# Patient Record
Sex: FEMALE | Race: OTHER | Employment: OTHER | URBAN - METROPOLITAN AREA
[De-identification: names, ages, dates, MRNs, and addresses within clinical notes are randomized per-mention and may not be internally consistent; named-entity substitution may affect disease eponyms.]

---

## 2017-03-11 ENCOUNTER — OFFICE VISIT (OUTPATIENT)
Dept: FAMILY MEDICINE CLINIC | Age: 67
End: 2017-03-11

## 2017-03-11 VITALS
BODY MASS INDEX: 46.11 KG/M2 | SYSTOLIC BLOOD PRESSURE: 138 MMHG | HEIGHT: 61 IN | HEART RATE: 78 BPM | DIASTOLIC BLOOD PRESSURE: 88 MMHG | WEIGHT: 244.2 LBS | TEMPERATURE: 98.3 F | RESPIRATION RATE: 18 BRPM | OXYGEN SATURATION: 98 %

## 2017-03-11 DIAGNOSIS — Z13.820 OSTEOPOROSIS SCREENING: ICD-10-CM

## 2017-03-11 DIAGNOSIS — R06.2 WHEEZING: ICD-10-CM

## 2017-03-11 DIAGNOSIS — I10 ESSENTIAL HYPERTENSION: Primary | ICD-10-CM

## 2017-03-11 DIAGNOSIS — Z12.31 VISIT FOR SCREENING MAMMOGRAM: ICD-10-CM

## 2017-03-11 DIAGNOSIS — R05.9 COUGH: ICD-10-CM

## 2017-03-11 DIAGNOSIS — E66.01 OBESITY, CLASS III, BMI 40-49.9 (MORBID OBESITY) (HCC): ICD-10-CM

## 2017-03-11 DIAGNOSIS — J06.9 UPPER RESPIRATORY TRACT INFECTION, UNSPECIFIED TYPE: ICD-10-CM

## 2017-03-11 RX ORDER — ACETAMINOPHEN 325 MG/1
TABLET ORAL
COMMUNITY
End: 2022-02-01 | Stop reason: ALTCHOICE

## 2017-03-11 RX ORDER — METHYLPREDNISOLONE 4 MG/1
TABLET ORAL
Qty: 1 DOSE PACK | Refills: 0 | Status: SHIPPED | OUTPATIENT
Start: 2017-03-11 | End: 2017-04-05 | Stop reason: ALTCHOICE

## 2017-03-11 RX ORDER — AMOXICILLIN AND CLAVULANATE POTASSIUM 875; 125 MG/1; MG/1
1 TABLET, FILM COATED ORAL 2 TIMES DAILY
Qty: 20 TAB | Refills: 0 | Status: SHIPPED | OUTPATIENT
Start: 2017-03-11 | End: 2017-03-21

## 2017-03-11 NOTE — MR AVS SNAPSHOT
Visit Information Date & Time Provider Department Dept. Phone Encounter #  
 3/11/2017 10:00 AM Taiwo Donaldson MD [address] [place] [address] [phone] [id number] Follow-up Instructions Return in about 4 days (around 3/15/2017) for follow up. Upcoming Health Maintenance Date Due DTaP/Tdap/Td series (1 - Tdap) 2/11/2004 ZOSTER VACCINE AGE 60> 4/8/2010 GLAUCOMA SCREENING Q2Y 4/8/2015 OSTEOPOROSIS SCREENING (DEXA) 4/8/2015 Pneumococcal 65+ Low/Medium Risk (1 of 2 - PCV13) 4/8/2015 MEDICARE YEARLY EXAM 4/8/2015 BREAST CANCER SCRN MAMMOGRAM 4/28/2016 COLONOSCOPY 7/8/2021 Allergies as of 3/11/2017  Review Complete On: 3/11/2017 By: Taiwo Donaldson MD  
  
 Severity Noted Reaction Type Reactions Bactrim [Sulfamethoprim Ds]  03/30/2012    Hives Clindamycin  03/30/2012   Side Effect Hives Current Immunizations  Reviewed on 3/11/2017 Name Date Influenza Vaccine 11/5/2016, 12/3/2014 Td 2/10/2004 Reviewed by Taiwo Donaldson MD on 3/11/2017 at  9:45 AM  
You Were Diagnosed With   
  
 Codes Comments Essential hypertension    -  Primary ICD-10-CM: I10 
ICD-9-CM: 401.9 Cough     ICD-10-CM: R05 ICD-9-CM: 514. 2 Upper respiratory tract infection, unspecified type     ICD-10-CM: J06.9 ICD-9-CM: 465.9 Wheezing     ICD-10-CM: R06.2 ICD-9-CM: 786.07 Visit for screening mammogram     ICD-10-CM: Z12.31 
ICD-9-CM: V76.12 Osteoporosis screening     ICD-10-CM: Z13.820 ICD-9-CM: V82.81 Obesity, Class III, BMI 40-49.9 (morbid obesity) (HCC)     ICD-10-CM: E66.01 
ICD-9-CM: 278.01 Vitals BP Pulse Temp Resp Height(growth percentile) Weight(growth percentile) 138/88 78 98.3 °F (36.8 °C) (Oral) 18 5' 1\" (1.549 m) 244 lb 3.2 oz (110.8 kg) LMP SpO2 BMI OB Status Smoking Status 03/02/2001 98% 46.14 kg/m2 Postmenopausal Never Smoker Vitals History BMI and BSA Data Body Mass Index Body Surface Area  
 46.14 kg/m 2 2.18 m 2 Preferred Pharmacy Pharmacy Name Phone [facility] [address] [facility] [phone] Your Updated Medication List  
  
   
This list is accurate as of: 3/11/17 10:15 AM.  Always use your most recent med list.  
  
  
  
  
 * albuterol 2.5 mg /3 mL (0.083 %) nebulizer solution Commonly known as:  PROVENTIL VENTOLIN  
3 mL by Nebulization route every four (4) hours as needed for Wheezing. * albuterol 90 mcg/actuation inhaler Commonly known as:  PROAIR HFA Take 2 Puffs by inhalation every six (6) hours as needed for Wheezing or Shortness of Breath. amoxicillin-clavulanate 875-125 mg per tablet Commonly known as:  AUGMENTIN Take 1 Tab by mouth two (2) times a day for 10 days. aspirin delayed-release 81 mg tablet Take 81 mg by mouth daily. methylPREDNISolone 4 mg tablet Commonly known as:  Yun Reason Take as directed  
  
 montelukast 10 mg tablet Commonly known as:  SINGULAIR  
TAKE ONE TABLET BY MOUTH DAILY FOR ALLERGIES AND ASTHMA THERAFLU COUGH PO Take  by mouth. TYLENOL 325 mg tablet Generic drug:  acetaminophen Take  by mouth every four (4) hours as needed for Pain. * Notice: This list has 2 medication(s) that are the same as other medications prescribed for you. Read the directions carefully, and ask your doctor or other care provider to review them with you. Prescriptions Sent to Pharmacy Refills  
 methylPREDNISolone (MEDROL DOSEPACK) 4 mg tablet 0 Sig: Take as directed Class: Normal  
 Pharmacy: [address]  #: [phone]  
 amoxicillin-clavulanate (AUGMENTIN) 875-125 mg per tablet 0 Sig: Take 1 Tab by mouth two (2) times a day for 10 days.   
 Class: Normal  
 Pharmacy: [address] [place] [address], [address] [facility]  #: [phone] Route: Oral  
  
Follow-up Instructions Return in about 4 days (around 3/15/2017) for follow up. To-Do List   
 03/11/2017 Imaging:  DEXA BONE DENSITY STUDY AXIAL   
  
 03/11/2017 Imaging:  CARLOS MAMMO BI SCREENING INCL CAD Referral Information Referral ID Referred By Referred To  
  
 [id number] Hector Lorenzo Not Available Visits Status Start Date End Date 1 New Request 3/11/17 3/11/18 If your referral has a status of pending review or denied, additional information will be sent to support the outcome of this decision. Introducing [facility] & HEALTH SERVICES! Dear Farhad Winters: Thank you for requesting a [employer] account. Our records indicate that you already have an active [employer] account. You can access your account anytime at [email or web address] Did you know that you can access your hospital and ER discharge instructions at any time in [employer]? You can also review all of your test results from your hospital stay or ER visit. Additional Information If you have questions, please visit the Frequently Asked Questions section of the [employer] website at [email or web address] Remember, [employer] is NOT to be used for urgent needs. For medical emergencies, dial 911. Now available from your iPhone and Android! Please provide this summary of care documentation to your next provider. Your primary care clinician is listed as Eloise Temple. If you have any questions after today's visit, please call [phone].

## 2017-03-11 NOTE — PROGRESS NOTES
Chief Complaint   Patient presents with    Wheezing     albuterol nebulizer used at 0400    Cough     x 6 days, productive clear mucus    Headache     Pt c/o cough and wheezing x 1 week, theraflu taken  Also c/o a constant headache 8/10, otc tylenol taken with little   \"REVIEWED RECORD IN PREPARATION FOR VISIT AND HAVE OBTAINED THE NECESSARY DOCUMENTATION\"

## 2017-03-11 NOTE — PROGRESS NOTES
HISTORY OF PRESENT ILLNESS  Vanessa Tabor is a 77 y.o. female c/o cough and congestion. Blood pressure 138/88, pulse 78, temperature 98.3 °F (36.8 °C), temperature source Oral, resp. rate 18, height 5' 1\" (1.549 m), weight 244 lb 3.2 oz (110.8 kg), last menstrual period 03/02/2001, SpO2 98 %. Body mass index is 46.14 kg/(m^2). Chief Complaint   Patient presents with    Wheezing     albuterol nebulizer used at 0400    Cough     x 6 days, productive clear mucus    Headache     Wheezing    Associated symptoms include headaches and cough. Pertinent negatives include no chest pain. Cough   Associated symptoms include headaches. Pertinent negatives include no chest pain and no shortness of breath. Headache   Associated symptoms include headaches. Pertinent negatives include no chest pain and no shortness of breath. Cough: Pt c/o sinus pressure, productive cough, and nasal congestion x Monday and worsening on Wednesday. Pt endorses using nebulizer treatments last night and at 4am this morning. Pt denies fever or chills. Pt reports SOB with exertion. Pt endorses taking Singulair daily and using an inhaler this morning. Pt states a Z-pack prescribed by Dr. Shantel Brito did not offer relief a few months ago. BP: Pt's BP is slightly elevated at 149/74 and 138/88 upon recheck today. Pt endorses checking her BP at home. Health Maintenance: Pt is due for mammogram and dexa bone density study. Pt was instructed to have a Dexa bone density study every 2 years. Current Outpatient Prescriptions   Medication Sig Dispense Refill    DEXTROMETHORPHAN HBR (THERAFLU COUGH PO) Take  by mouth.  acetaminophen (TYLENOL) 325 mg tablet Take  by mouth every four (4) hours as needed for Pain.  methylPREDNISolone (MEDROL DOSEPACK) 4 mg tablet Take as directed 1 Dose Pack 0    amoxicillin-clavulanate (AUGMENTIN) 875-125 mg per tablet Take 1 Tab by mouth two (2) times a day for 10 days.  20 Tab 0    montelukast (SINGULAIR) 10 mg tablet TAKE ONE TABLET BY MOUTH DAILY FOR ALLERGIES AND ASTHMA 30 Tab 5    albuterol (PROVENTIL VENTOLIN) 2.5 mg /3 mL (0.083 %) nebulizer solution 3 mL by Nebulization route every four (4) hours as needed for Wheezing. 24 Each 3    albuterol (PROAIR HFA) 90 mcg/actuation inhaler Take 2 Puffs by inhalation every six (6) hours as needed for Wheezing or Shortness of Breath. 1 Inhaler 5    aspirin delayed-release 81 mg tablet Take 81 mg by mouth daily. Allergies   Allergen Reactions    Bactrim [Sulfamethoprim Ds] Hives    Clindamycin Hives     Past Medical History:   Diagnosis Date    AR (allergic rhinitis) 2010    Chronic Venous Stasis BLE 10/14/2010    DJD (degenerative joint disease) of knee 2010    Factor V Leiden mutation ([facility] 2010    HTN (hypertension) 2010    Plantar fasciitis 2010    Postmenopausal 2010    Stasis, venous 2010    Stroke ([facility]     T. I.A. 2010    Venous stasis dermatitis 10/14/2010     Past Surgical History:   Procedure Laterality Date    HX BACK SURGERY      lumbar disc    HX  SECTION       Family History   Problem Relation Age of Onset   Verlinda Smoker Asthma Sister     Asthma Brother     MS Daughter     Allergic Rhinitis Son     Thyroid Disease Daughter      Social History   Substance Use Topics    Smoking status: Never Smoker    Smokeless tobacco: Never Used    Alcohol use No          Review of Systems   Constitutional: Negative. Negative for malaise/fatigue. Eyes: Negative for blurred vision. Respiratory: Positive for cough and wheezing. Negative for shortness of breath. Cardiovascular: Negative for chest pain and leg swelling. Musculoskeletal: Negative. Neurological: Positive for headaches. Negative for dizziness. All other systems reviewed and are negative. Physical Exam   Constitutional: She is oriented to person, place, and time. She appears well-developed and well-nourished. No distress. HENT:   Head: Normocephalic and atraumatic. Neck: Carotid bruit is not present. Cardiovascular: Normal rate, regular rhythm, normal heart sounds and intact distal pulses. Exam reveals no gallop and no friction rub. No murmur heard. Pulmonary/Chest: Effort normal. No respiratory distress. She has wheezes. She has no rales. Diffuse wheezing bilaterally   Musculoskeletal: She exhibits no edema. Neurological: She is alert and oriented to person, place, and time. Skin: She is not diaphoretic. Psychiatric: She has a normal mood and affect. Her behavior is normal. Judgment and thought content normal.   Nursing note and vitals reviewed. ASSESSMENT and PLAN  Julia was seen today for wheezing, cough and headache. Diagnoses and all orders for this visit:    Essential hypertension  Not adequately controlled, will follow up next week with me    Cough  -     Advised to take OTC robitussin  Upper respiratory tract infection, unspecified type  -     methylPREDNISolone (MEDROL DOSEPACK) 4 mg tablet; Take as directed  -     amoxicillin-clavulanate (AUGMENTIN) 875-125 mg per tablet; Take 1 Tab by mouth two (2) times a day for 10 days. Wheezing  -     methylPREDNISolone (MEDROL DOSEPACK) 4 mg tablet; Take as directed    Visit for screening mammogram  -     CARLOS MAMMO BI SCREENING INCL CAD; Future    Osteoporosis screening  -     DEXA BONE DENSITY STUDY AXIAL; Future    Obesity, Class III, BMI 40-49.9 (morbid obesity) ([facility]  I have reviewed/discussed the above normal BMI with the patient. I have recommended the following interventions: dietary management education, guidance, and counseling, encourage exercise and weight loss from baseline weight . The plan is as follows: I have counseled this patient on diet and exercise regimens. .      Pt is due for a mammogram. Mammogram ordered today. Pt is due for a Dexa bone density study. Mammogram ordered today.     I prescribed Augmentin 875-125mg BID for 7 days and a Medrol dosepack for management of URI. Pt was given a nebulizer treatment in the office today. Pt will f/u in 4 days. Follow-up Disposition:  Return in about 4 days (around 3/15/2017) for follow up. Medication risks/benefits/costs/interactions/alternatives discussed with patient. Advised patient to call back or return to office if symptoms worsen/change/persist.  If patient cannot reach us or should anything more severe/urgent arise he/she should proceed directly to the nearest emergency department. Discussed expected course/resolution/complications of diagnosis in detail with patient. Patient given a written after visit summary which includes her diagnoses, current medications and vitals. Patient expressed understanding with the diagnosis and plan. Written by Kaylie Shipley, as dictated by Dr. John Doe.    I have reviewed and agree with the above note and have made corrections where appropriate, Dr. John Doe MD

## 2017-03-15 ENCOUNTER — OFFICE VISIT (OUTPATIENT)
Dept: FAMILY MEDICINE CLINIC | Age: 67
End: 2017-03-15

## 2017-03-15 VITALS
DIASTOLIC BLOOD PRESSURE: 82 MMHG | BODY MASS INDEX: 45.84 KG/M2 | TEMPERATURE: 98.7 F | HEIGHT: 61 IN | SYSTOLIC BLOOD PRESSURE: 138 MMHG | OXYGEN SATURATION: 96 % | HEART RATE: 77 BPM | RESPIRATION RATE: 16 BRPM | WEIGHT: 242.8 LBS

## 2017-03-15 DIAGNOSIS — J45.909 BRONCHITIS WITH ASTHMA, ACUTE: Primary | ICD-10-CM

## 2017-03-15 DIAGNOSIS — J45.41 MODERATE PERSISTENT ASTHMA WITH ACUTE EXACERBATION: ICD-10-CM

## 2017-03-15 DIAGNOSIS — J20.9 BRONCHITIS WITH ASTHMA, ACUTE: Primary | ICD-10-CM

## 2017-03-15 DIAGNOSIS — R06.02 SOB (SHORTNESS OF BREATH): ICD-10-CM

## 2017-03-15 RX ORDER — LEVOFLOXACIN 500 MG/1
500 TABLET, FILM COATED ORAL DAILY
Qty: 10 TAB | Refills: 0 | Status: SHIPPED | OUTPATIENT
Start: 2017-03-15 | End: 2017-03-25

## 2017-03-15 NOTE — LETTER
NOTIFICATION RETURN TO WORK  
 
3/15/2017 7:11 PM 
 
Ms. Brielle Lorenzo 
[address] [facility] [address] [address] To Whom It May Concern: 
 
Brielle Lorenzo is currently under the care of GLENYS Estrada. She will return to work on: 3/20/17 If there are questions or concerns please have the patient contact our office. Sincerely, Shun Maynard MD

## 2017-03-15 NOTE — PROGRESS NOTES
HISTORY OF PRESENT ILLNESS  Monalisa Wick is a 77 y.o. female. Blood pressure (!) 143/96, pulse 77, temperature 98.7 °F (37.1 °C), temperature source Oral, resp. rate 16, height 5' 1\" (1.549 m), weight 242 lb 12.8 oz (110.1 kg), last menstrual period 03/02/2001, SpO2 96 %. Body mass index is 45.88 kg/(m^2). Chief Complaint   Patient presents with    Cough      HPI   Monalisa Wick 77 y.o. female  presents to the office today for a follow up on URI. Bp at office today 138/82 with manual arm cuff recheck. URI: Recall pt was seen at office on 03/11/17 for sinus pressure, productive cough, and congestion for past 1-2 weeks. Pt was advised to start Augmentin and Medrol Dose Pack. Pt notes she continues with productive cough and congestion. She states has progressed from clear to yellow-tinged. Pt notes history of asthma and has been receiving Albuterol nebulizer treatments at home. Xopenex nebulizer treatment given to pt at office today. Her chest XR today revealed no acute process. Likely bronchitis. I have advised pt to start Symbicort inhaler for her breathing and to start Levaquin. Pt to notify me if symptoms progress or fail to improve. Pt advised to go to emergency room if SOB progresses. Pt will follow up with OV in two weeks. Current Outpatient Prescriptions   Medication Sig Dispense Refill    levoFLOXacin (LEVAQUIN) 500 mg tablet Take 1 Tab by mouth daily for 10 days. 10 Tab 0    acetaminophen (TYLENOL) 325 mg tablet Take  by mouth every four (4) hours as needed for Pain.  methylPREDNISolone (MEDROL DOSEPACK) 4 mg tablet Take as directed 1 Dose Pack 0    amoxicillin-clavulanate (AUGMENTIN) 875-125 mg per tablet Take 1 Tab by mouth two (2) times a day for 10 days.  20 Tab 0    montelukast (SINGULAIR) 10 mg tablet TAKE ONE TABLET BY MOUTH DAILY FOR ALLERGIES AND ASTHMA 30 Tab 5    albuterol (PROVENTIL VENTOLIN) 2.5 mg /3 mL (0.083 %) nebulizer solution 3 mL by Nebulization route every four (4) hours as needed for Wheezing. 24 Each 3    albuterol (PROAIR HFA) 90 mcg/actuation inhaler Take 2 Puffs by inhalation every six (6) hours as needed for Wheezing or Shortness of Breath. 1 Inhaler 5    aspirin delayed-release 81 mg tablet Take 81 mg by mouth daily.  DEXTROMETHORPHAN HBR (THERAFLU COUGH PO) Take  by mouth. Allergies   Allergen Reactions    Bactrim [Sulfamethoprim Ds] Hives    Clindamycin Hives     Past Medical History:   Diagnosis Date    AR (allergic rhinitis) 2010    Chronic Venous Stasis BLE 10/14/2010    DJD (degenerative joint disease) of knee 2010    Factor V Leiden mutation ([facility] 2010    HTN (hypertension) 2010    Plantar fasciitis 2010    Postmenopausal 2010    Stasis, venous 2010    Stroke ([facility]     T. I.A. 2010    Venous stasis dermatitis 10/14/2010     Past Surgical History:   Procedure Laterality Date    HX BACK SURGERY      lumbar disc    HX  SECTION       Family History   Problem Relation Age of Onset   Pinedo Asthma Sister     Asthma Brother     MS Daughter     Allergic Rhinitis Son     Thyroid Disease Daughter      Social History   Substance Use Topics    Smoking status: Never Smoker    Smokeless tobacco: Never Used    Alcohol use No        Review of Systems   Constitutional: Negative. Negative for malaise/fatigue. HENT: Positive for congestion. Eyes: Negative for blurred vision. Respiratory: Positive for cough, sputum production (yellow), shortness of breath and wheezing. Cardiovascular: Negative for chest pain and leg swelling. Musculoskeletal: Negative. Neurological: Negative. Negative for dizziness and headaches. All other systems reviewed and are negative. Physical Exam   Constitutional: She is oriented to person, place, and time. She appears well-developed and well-nourished. No distress. HENT:   Head: Normocephalic and atraumatic.    Neck: Carotid bruit is not present. Cardiovascular: Normal rate, regular rhythm, normal heart sounds and intact distal pulses. Exam reveals no gallop and no friction rub. No murmur heard. Pulmonary/Chest: Effort normal. No respiratory distress. She has wheezes (bilaterally ). She has no rales. Musculoskeletal: She exhibits no edema. Neurological: She is alert and oriented to person, place, and time. Skin: She is not diaphoretic. Psychiatric: She has a normal mood and affect. Her behavior is normal. Judgment and thought content normal.   Nursing note and vitals reviewed. ASSESSMENT and PLAN  Julia was seen today for cough. Diagnoses and all orders for this visit:    Bronchitis with asthma, acute  -     levoFLOXacin (LEVAQUIN) 500 mg tablet; Take 1 Tab by mouth daily for 10 days. - Chest XR today revealed no pneumonia. Likely bronchitis. I have advised pt to start Symbicort inhaler to help her breathing and to start 10-day course of Levaquin. Advised pt to go to the emergency room if SOB progresses. Pt will follow up with OV in two weeks. Moderate persistent asthma with acute exacerbation  See above    SOB (shortness of breath)  -     XR CHEST PA LAT; Future  - See above. Follow-up Disposition:  Return in about 2 weeks (around 3/29/2017) for bronchitis. Medication risks/benefits/costs/interactions/alternatives discussed with patient. Advised patient to call back or return to office if symptoms worsen/change/persist.  If patient cannot reach us or should anything more severe/urgent arise he/she should proceed directly to the nearest emergency department. Discussed expected course/resolution/complications of diagnosis in detail with patient. Patient given a written after visit summary which includes her diagnoses, current medications and vitals. Patient expressed understanding with the diagnosis and plan. Written by elvira Aviles, as dictated by Aureliano Ghosh M.D.    I have reviewed and agree with the above note and have made corrections where appropriate, Dr. Carolyn An MD

## 2017-03-15 NOTE — PROGRESS NOTES
Chief Complaint   Patient presents with    Cough       1. Have you been to the ER, urgent care clinic since your last visit? Hospitalized since your last visit? No    2. Have you seen or consulted any other health care providers outside of the [address] [place] [address] since your last visit? Include any pap smears or colon screening. No    Body mass index is 45.88 kg/(m^2).

## 2017-03-21 ENCOUNTER — HOSPITAL ENCOUNTER (OUTPATIENT)
Dept: MAMMOGRAPHY | Age: 67
Discharge: HOME | End: 2017-03-21
Attending: FAMILY MEDICINE
Payer: COMMERCIAL

## 2017-03-21 DIAGNOSIS — Z12.31 VISIT FOR SCREENING MAMMOGRAM: ICD-10-CM

## 2017-03-21 DIAGNOSIS — Z13.820 OSTEOPOROSIS SCREENING: ICD-10-CM

## 2017-03-21 PROCEDURE — 77080 DXA BONE DENSITY AXIAL: CPT

## 2017-03-21 PROCEDURE — 77067 SCR MAMMO BI INCL CAD: CPT

## 2017-03-21 NOTE — LETTER
3/22/2017 12:09 PM 
 
Ms. [facility] 
[address] [facility] [address] [address] [address] [place] [address]: 
 
Please find your most recent results below. Resulted Orders CARLOS MAMMO BI SCREENING INCL CAD Narrative STUDY: Bilateral digital screening mammogram 
 
INDICATION:  Screening. COMPARISON:  Priors dating back to 2010 BREAST COMPOSITION:  There are scattered areas of fibroglandular density. FINDINGS: Bilateral digital screening mammography was performed and is 
interpreted in conjunction with a computer assisted detection (CAD) system. No 
suspicious masses or calcifications are identified. There has been no 
significant change. Impression IMPRESSION: 
BI-RADS 1: Negative. No mammographic evidence of malignancy. RECOMMENDATIONS: 
Next screening mammogram is recommended in one year. The patient will be notified of these results. Please call me if you have any questions: [phone] Sincerely, Jordana Doe

## 2017-03-21 NOTE — PROGRESS NOTES
IMPRESSION:  BI-RADS 1: Negative. No mammographic evidence of malignancy.     RECOMMENDATIONS:  Next screening mammogram is recommended in one year.

## 2017-04-05 ENCOUNTER — OFFICE VISIT (OUTPATIENT)
Dept: FAMILY MEDICINE CLINIC | Age: 67
End: 2017-04-05

## 2017-04-05 ENCOUNTER — TELEPHONE (OUTPATIENT)
Dept: FAMILY MEDICINE CLINIC | Age: 67
End: 2017-04-05

## 2017-04-05 VITALS
OXYGEN SATURATION: 97 % | BODY MASS INDEX: 47.01 KG/M2 | TEMPERATURE: 98.1 F | DIASTOLIC BLOOD PRESSURE: 68 MMHG | WEIGHT: 249 LBS | SYSTOLIC BLOOD PRESSURE: 136 MMHG | RESPIRATION RATE: 18 BRPM | HEIGHT: 61 IN | HEART RATE: 88 BPM

## 2017-04-05 DIAGNOSIS — L03.116 CELLULITIS OF LEFT LOWER EXTREMITY: Primary | ICD-10-CM

## 2017-04-05 DIAGNOSIS — R60.9 EDEMA, UNSPECIFIED TYPE: ICD-10-CM

## 2017-04-05 DIAGNOSIS — J45.41 MODERATE PERSISTENT ASTHMA WITH ACUTE EXACERBATION: ICD-10-CM

## 2017-04-05 RX ORDER — CEFTRIAXONE 1 G/1
1 INJECTION, POWDER, FOR SOLUTION INTRAMUSCULAR; INTRAVENOUS ONCE
Qty: 1 VIAL | Refills: 0
Start: 2017-04-05 | End: 2017-04-05

## 2017-04-05 RX ORDER — FLUTICASONE PROPIONATE AND SALMETEROL 250; 50 UG/1; UG/1
1 POWDER RESPIRATORY (INHALATION) EVERY 12 HOURS
Qty: 1 INHALER | Refills: 0 | Status: SHIPPED | OUTPATIENT
Start: 2017-04-05 | End: 2017-08-14 | Stop reason: ALTCHOICE

## 2017-04-05 RX ORDER — CEPHALEXIN 500 MG/1
500 CAPSULE ORAL 4 TIMES DAILY
Qty: 40 CAP | Refills: 0 | Status: SHIPPED | OUTPATIENT
Start: 2017-04-05 | End: 2017-04-15

## 2017-04-05 RX ORDER — MONTELUKAST SODIUM 10 MG/1
TABLET ORAL
Qty: 90 TAB | Refills: 1 | Status: SHIPPED | OUTPATIENT
Start: 2017-04-05 | End: 2017-08-14 | Stop reason: SDUPTHER

## 2017-04-05 RX ORDER — HYDROXYZINE 50 MG/1
50 TABLET, FILM COATED ORAL
Qty: 30 TAB | Refills: 0 | Status: SHIPPED | OUTPATIENT
Start: 2017-04-05 | End: 2017-04-15

## 2017-04-05 RX ORDER — ALBUTEROL SULFATE 0.83 MG/ML
2.5 SOLUTION RESPIRATORY (INHALATION)
Qty: 72 EACH | Refills: 1 | Status: SHIPPED | OUTPATIENT
Start: 2017-04-05 | End: 2021-08-03

## 2017-04-05 RX ORDER — FUROSEMIDE 20 MG/1
20 TABLET ORAL DAILY
Qty: 30 TAB | Refills: 0 | Status: SHIPPED | OUTPATIENT
Start: 2017-04-05 | End: 2017-08-14

## 2017-04-05 NOTE — TELEPHONE ENCOUNTER
Patients daughter, Bj Marsh is calling, Bj Marsh states that she is currently calling her mother right now to see if she is able to come in and see Dr. Mary Yi at 10:30. Bj Marsh states that her mother just got back from [place] and while she was over there she was complaining about her cellulitis in her leg \"acting up\" and also wheezing. Bj Marsh states that normally Dr. Mary Yi just prescribes the patient medication for her cellulitis without seeing her, advised Jeanne Joseph guarantee he will do that but I will send back a message and the nurse will have to give her call back .      Best call back # for [phone]

## 2017-04-05 NOTE — PROGRESS NOTES
HISTORY OF PRESENT ILLNESS  Haile Alexander is a 77 y.o. female. Blood pressure 136/68, pulse 88, temperature 98.1 °F (36.7 °C), temperature source Oral, resp. rate 18, height 5' 1\" (1.549 m), weight 249 lb (112.9 kg), last menstrual period 03/02/2001, SpO2 97 %. Body mass index is 47.05 kg/(m^2). Chief Complaint   Patient presents with    Skin Infection     cellulitis of left lower leg and left foot    Cough     cough with clear sputum x while      HPI   Haile Alexander 77 y.o. female  presents to the office today with acute complaint of cellulitis. Bp at office today 136/68. Cellulitis: Pt notes skin infection and associated swelling of left lower leg. Her left lower leg is slightly tender during the day and very sensitive in the evenings. There is also associated swelling in right lower leg and pt notes similar symptoms are starting in her right leg. She has been elevating her legs with mild relief. Likely cellulitis of left LE. Pt will receive Rocephin injection at office today and I have advised her to start course of Keflex. Pt may also take Lasix as needed for swelling and Atarax as needed for her skin irritation. Advised pt to go to the emergency room if she notes the erythema of her legs spreads further. Wheezing: Pt has history of asthma and she has been having issues with wheezing since coming back from a trip to [place] ([facility]). Pt also continues with a cough producing clear sputum. She notes she has used Advair inhaler in the past and this has worked well for her. I have advised pt to start Singulair, Advair inhaler, and continue with Albuterol nebulizer and Symbicort. Samples of Symbicort given today. Current Outpatient Prescriptions   Medication Sig Dispense Refill    acetaminophen (TYLENOL) 325 mg tablet Take  by mouth every four (4) hours as needed for Pain.       montelukast (SINGULAIR) 10 mg tablet TAKE ONE TABLET BY MOUTH DAILY FOR ALLERGIES AND ASTHMA 30 Tab 5    albuterol (PROVENTIL VENTOLIN) 2.5 mg /3 mL (0.083 %) nebulizer solution 3 mL by Nebulization route every four (4) hours as needed for Wheezing. 24 Each 3    albuterol (PROAIR HFA) 90 mcg/actuation inhaler Take 2 Puffs by inhalation every six (6) hours as needed for Wheezing or Shortness of Breath. 1 Inhaler 5    aspirin delayed-release 81 mg tablet Take 81 mg by mouth daily. Allergies   Allergen Reactions    Bactrim [Sulfamethoprim Ds] Hives    Clindamycin Hives     Past Medical History:   Diagnosis Date    AR (allergic rhinitis) 2010    Chronic Venous Stasis BLE 10/14/2010    DJD (degenerative joint disease) of knee 2010    Factor V Leiden mutation ([facility] 2010    HTN (hypertension) 2010    Plantar fasciitis 2010    Postmenopausal 2010    Stasis, venous 2010    Stroke ([facility]     T. I.A. 2010    Venous stasis dermatitis 10/14/2010     Past Surgical History:   Procedure Laterality Date    HX BACK SURGERY      lumbar disc    HX  SECTION       Family History   Problem Relation Age of Onset   24 [facility] Asthma Sister     Asthma Brother     MS Daughter     Allergic Rhinitis Son     Thyroid Disease Daughter      Social History   Substance Use Topics    Smoking status: Never Smoker    Smokeless tobacco: Never Used    Alcohol use No        Review of Systems   Constitutional: Negative. Negative for malaise/fatigue. Eyes: Negative for blurred vision. Respiratory: Positive for cough and sputum production (clear). Negative for shortness of breath. Cardiovascular: Negative for chest pain and leg swelling. Musculoskeletal: Negative. Skin: Positive for itching.        + cellulitis of left lower leg and associated swelling (less so in right lower leg)    Neurological: Negative. Negative for dizziness and headaches. All other systems reviewed and are negative. Physical Exam   Constitutional: She is oriented to person, place, and time.  She appears well-developed and well-nourished. No distress. HENT:   Head: Normocephalic and atraumatic. Neck: Carotid bruit is not present. Cardiovascular: Normal rate, regular rhythm, normal heart sounds and intact distal pulses. Exam reveals no gallop and no friction rub. No murmur heard. Pulmonary/Chest: Effort normal and breath sounds normal. No respiratory distress. She has no wheezes. She has no rales. Musculoskeletal: She exhibits edema (trace edema of LEs). Neurological: She is alert and oriented to person, place, and time. Skin: She is not diaphoretic. Left lower leg: Circumferential erythema around left lower leg region going around, also notes swelling around, and little punctate lesions, no drainage, slight tenderness to palpation, warmth to touch, Peter's negative  Right lower leg: Peter's negative, also slight erythema noted in calf region, punctate lesions, slight erythema, but not as intense as in left leg   Psychiatric: She has a normal mood and affect. Her behavior is normal. Judgment and thought content normal.   Nursing note and vitals reviewed. ASSESSMENT and PLAN  Geno Mccoy was seen today for skin infection and cough. Diagnoses and all orders for this visit:    Cellulitis of left lower extremity  -     cefTRIAXone (ROCEPHIN) 1 gram injection; 1 g by IntraMUSCular route once for 1 dose. -     CEFTRIAXONE SODIUM INJECTION  MG (Qty 4 for 1 gm)  -     THER/PROPH/DIAG INJECTION, SUBCUT/IM  -     cephALEXin (KEFLEX) 500 mg capsule; Take 1 Cap by mouth four (4) times daily for 10 days.        - Pt will receive Rocephin injection at office today and I have advised her to start course of Keflex. Pt may also take Lasix as needed for swelling and Atarax as needed for her skin irritation. Advised pt to go to the emergency room if she notes the erythema of her legs spreads further. Pt will follow up with OV in one week.      Edema, unspecified type  -     furosemide (LASIX) 20 mg tablet; Take 1 Tab by mouth daily.  - Pt to take Lasix as needed to help improve swelling. Moderate persistent asthma with acute exacerbation  -     albuterol (PROVENTIL VENTOLIN) 2.5 mg /3 mL (0.083 %) nebulizer solution; 3 mL by Nebulization route every four (4) hours as needed for Wheezing.  -     montelukast (SINGULAIR) 10 mg tablet; TAKE ONE TABLET BY MOUTH DAILY FOR ALLERGIES AND ASTHMA  -     fluticasone-salmeterol (ADVAIR) 250-50 mcg/dose diskus inhaler; Take 1 Puff by inhalation every twelve (12) hours. -  Pt notes flare up of her asthma with associated wheezing since coming back from a trip to [place] ([facility]). I have advised pt to start Singulair 10 mg daily, Advair inhaler, and continue with Albuterol nebulizer and Symbicort. Samples of Symbicort given today. Other orders  -     hydrOXYzine HCl (ATARAX) 50 mg tablet; Take 1 Tab by mouth three (3) times daily as needed for Itching for up to 10 days. Follow-up Disposition:  Return in about 1 week (around 4/12/2017) for cellulitis follow up at 7:15PM.     Medication risks/benefits/costs/interactions/alternatives discussed with patient. Advised patient to call back or return to office if symptoms worsen/change/persist.  If patient cannot reach us or should anything more severe/urgent arise he/she should proceed directly to the nearest emergency department. Discussed expected course/resolution/complications of diagnosis in detail with patient. Patient given a written after visit summary which includes her diagnoses, current medications and vitals. Patient expressed understanding with the diagnosis and plan. Written by elvira Hernandez, as dictated by Jenaro Taylor M.D.    I have reviewed and agree with the above note and have made corrections where appropriate, Dr. Osman Hayes MD

## 2017-04-05 NOTE — MR AVS SNAPSHOT
Visit Information Date & Time Provider Department Dept. Phone Encounter #  
 4/5/2017 10:30 AM Lyndsey Baugh MD [facility] [phone] [id number] Follow-up Instructions Return in about 1 week (around 4/12/2017) for cellulitis follow up at 7:15PM. Your Appointments 4/10/2017  4:30 PM  
ROUTINE CARE with Lyndsey Baugh MD  
[facility]) Appt Note: follow up visit [address] [id number]  
[phone]  
  
   
 [address] [id number] Upcoming Health Maintenance Date Due DTaP/Tdap/Td series (1 - Tdap) 2/11/2004 ZOSTER VACCINE AGE 60> 4/8/2010 GLAUCOMA SCREENING Q2Y 4/8/2015 Pneumococcal 65+ Low/Medium Risk (1 of 2 - PCV13) 4/8/2015 MEDICARE YEARLY EXAM 4/8/2015 BREAST CANCER SCRN MAMMOGRAM 3/21/2019 COLONOSCOPY 7/8/2021 Allergies as of 4/5/2017  Review Complete On: 3/17/2017 By: Lyndsey Baugh MD  
  
 Severity Noted Reaction Type Reactions Bactrim [Sulfamethoprim Ds]  03/30/2012    Hives Clindamycin  03/30/2012   Side Effect Hives Current Immunizations  Reviewed on 3/11/2017 Name Date Influenza Vaccine 11/5/2016, 12/3/2014 Td 2/10/2004 Not reviewed this visit You Were Diagnosed With   
  
 Codes Comments Cellulitis of left lower extremity    -  Primary ICD-10-CM: L03.116 ICD-9-CM: 682.6 Edema, unspecified type     ICD-10-CM: R60.9 ICD-9-CM: 782.3 Moderate persistent asthma with acute exacerbation     ICD-10-CM: J45.41 
ICD-9-CM: 493.92 Vitals BP Pulse Temp Resp Height(growth percentile) Weight(growth percentile) 136/68 (BP 1 Location: Right arm, BP Patient Position: Sitting) 88 98.1 °F (36.7 °C) (Oral) 18 5' 1\" (1.549 m) 249 lb (112.9 kg) LMP SpO2 BMI OB Status Smoking Status 03/02/2001 97% 47.05 kg/m2 Postmenopausal Never Smoker Vitals History BMI and BSA Data Body Mass Index Body Surface Area 47.05 kg/m 2 2.2 m 2 Preferred Pharmacy Pharmacy Name Phone [facility] [phone] [facility] [phone] Your Updated Medication List  
  
   
This list is accurate as of: 4/5/17 11:35 AM.  Always use your most recent med list.  
  
  
  
  
 * albuterol 90 mcg/actuation inhaler Commonly known as:  PROAIR HFA Take 2 Puffs by inhalation every six (6) hours as needed for Wheezing or Shortness of Breath. * albuterol 2.5 mg /3 mL (0.083 %) nebulizer solution Commonly known as:  PROVENTIL VENTOLIN  
3 mL by Nebulization route every four (4) hours as needed for Wheezing. aspirin delayed-release 81 mg tablet Take 81 mg by mouth daily. cefTRIAXone 1 gram injection Commonly known as:  ROCEPHIN  
1 g by IntraMUSCular route once for 1 dose. cephALEXin 500 mg capsule Commonly known as:  Deward Curly Take 1 Cap by mouth four (4) times daily for 10 days. fluticasone-salmeterol 250-50 mcg/dose diskus inhaler Commonly known as:  ADVAIR Take 1 Puff by inhalation every twelve (12) hours. furosemide 20 mg tablet Commonly known as:  LASIX Take 1 Tab by mouth daily. hydrOXYzine HCl 50 mg tablet Commonly known as:  ATARAX Take 1 Tab by mouth three (3) times daily as needed for Itching for up to 10 days. montelukast 10 mg tablet Commonly known as:  SINGULAIR  
TAKE ONE TABLET BY MOUTH DAILY FOR ALLERGIES AND ASTHMA TYLENOL 325 mg tablet Generic drug:  acetaminophen Take  by mouth every four (4) hours as needed for Pain. * Notice: This list has 2 medication(s) that are the same as other medications prescribed for you. Read the directions carefully, and ask your doctor or other care provider to review them with you. Prescriptions Sent to Pharmacy  Refills  
 albuterol (PROVENTIL VENTOLIN) 2.5 mg /3 mL (0.083 %) nebulizer solution 1  
 Sig: 3 mL by Nebulization route every four (4) hours as needed for Wheezing. Class: Normal  
 Pharmacy: [address] Ph #: [phone] Route: Nebulization  
 montelukast (SINGULAIR) 10 mg tablet 1 Sig: TAKE ONE TABLET BY MOUTH DAILY FOR ALLERGIES AND ASTHMA Class: Normal  
 Pharmacy: [address] Ph #: [phone]  
 cephALEXin (KEFLEX) 500 mg capsule 0 Sig: Take 1 Cap by mouth four (4) times daily for 10 days. Class: Normal  
 Pharmacy: [address] Ph #: [phone] Route: Oral  
 fluticasone-salmeterol (ADVAIR) 250-50 mcg/dose diskus inhaler 0 Sig: Take 1 Puff by inhalation every twelve (12) hours. Class: Normal  
 Pharmacy: [address] Ph #: [phone] Route: Inhalation  
 hydrOXYzine HCl (ATARAX) 50 mg tablet 0 Sig: Take 1 Tab by mouth three (3) times daily as needed for Itching for up to 10 days. Class: Normal  
 Pharmacy: [address] Ph #: [phone] Route: Oral  
 furosemide (LASIX) 20 mg tablet 0 Sig: Take 1 Tab by mouth daily. Class: Normal  
 Pharmacy: [address] Ph #: [phone] Route: Oral  
  
We Performed the Following CEFTRIAXONE SODIUM INJECTION  MG [ [facility]] Comments:  
 Mix per protocol CT THER/PROPH/DIAG INJECTION, SUBCUT/IM [id number] CPT(R)] Follow-up Instructions Return in about 1 week (around 4/12/2017) for cellulitis follow up at 7:15PM. Introducing [facility] & [facility]! Dear Zach Herrera: Thank you for requesting a MyChart account. Our records indicate that you already have an active Rockerbox account. You can access your account anytime at [email or web address] Did you know that you can access your hospital and ER discharge instructions at any time in Nvigen? You can also review all of your test results from your hospital stay or ER visit. Additional Information If you have questions, please visit the Frequently Asked Questions section of the Nvigen website at [email or web address] [email or web address]/. Remember, Nvigen is NOT to be used for urgent needs. For medical emergencies, dial 911. Now available from your iPhone and Android! Please provide this summary of care documentation to your next provider. Your primary care clinician is listed as Leigh Francisco. If you have any questions after today's visit, please call [phone].

## 2017-04-05 NOTE — PROGRESS NOTES
Patient presents in office today with c/o celllulitis of left lower leg and left foot and ankle. Pain level #7/10  Patient c/o productive cough with clear sputum. Patient has done 2 nebulizer treatments this morning    Chief Complaint   Patient presents with    Skin Infection     cellulitis of left lower leg and left foot    Cough     cough with clear sputum x while     1. Have you been to the ER, urgent care clinic since your last visit? Hospitalized since your last visit? No    2. Have you seen or consulted any other health care providers outside of the [address] [place] [address] since your last visit? Include any pap smears or colon screening. No     PHQ 2 / 9, over the last two weeks 4/5/2017   Little interest or pleasure in doing things Not at all   Feeling down, depressed or hopeless Not at all   Total Score PHQ 2 0     Fall Risk Assessment, last 12 mths 4/5/2017   Able to walk? Yes   Fall in past 12 months?  No

## 2017-04-12 ENCOUNTER — OFFICE VISIT (OUTPATIENT)
Dept: FAMILY MEDICINE CLINIC | Age: 67
End: 2017-04-12

## 2017-04-12 VITALS
WEIGHT: 245.6 LBS | TEMPERATURE: 98.4 F | BODY MASS INDEX: 46.37 KG/M2 | OXYGEN SATURATION: 94 % | SYSTOLIC BLOOD PRESSURE: 125 MMHG | RESPIRATION RATE: 16 BRPM | HEIGHT: 61 IN | DIASTOLIC BLOOD PRESSURE: 65 MMHG | HEART RATE: 72 BPM

## 2017-04-12 DIAGNOSIS — M85.80 OSTEOPENIA, UNSPECIFIED LOCATION: ICD-10-CM

## 2017-04-12 DIAGNOSIS — L02.419 CELLULITIS AND ABSCESS OF LEG: Primary | ICD-10-CM

## 2017-04-12 DIAGNOSIS — E66.01 OBESITY, CLASS III, BMI 40-49.9 (MORBID OBESITY) (HCC): ICD-10-CM

## 2017-04-12 DIAGNOSIS — L03.119 CELLULITIS AND ABSCESS OF LEG: Primary | ICD-10-CM

## 2017-04-12 DIAGNOSIS — Z23 ENCOUNTER FOR IMMUNIZATION: ICD-10-CM

## 2017-04-12 RX ORDER — BUDESONIDE AND FORMOTEROL FUMARATE DIHYDRATE 160; 4.5 UG/1; UG/1
2 AEROSOL RESPIRATORY (INHALATION) 2 TIMES DAILY
COMMUNITY
End: 2018-08-13 | Stop reason: ALTCHOICE

## 2017-04-12 NOTE — MR AVS SNAPSHOT
Visit Information Date & Time Provider Department Dept. Phone Encounter #  
 4/12/2017  7:15 PM Silvestre Francisco  [facility] [phone] [id number] Follow-up Instructions Return in about 4 months (around 8/12/2017) for physical.  
  
Upcoming Health Maintenance Date Due DTaP/Tdap/Td series (1 - Tdap) 2/11/2004 ZOSTER VACCINE AGE 60> 4/8/2010 GLAUCOMA SCREENING Q2Y 4/8/2015 Pneumococcal 65+ Low/Medium Risk (1 of 2 - PCV13) 4/8/2015 MEDICARE YEARLY EXAM 4/8/2015 BREAST CANCER SCRN MAMMOGRAM 3/21/2019 COLONOSCOPY 7/8/2021 Allergies as of 4/12/2017  Review Complete On: 4/12/2017 By: Silvestre Francisco MD  
  
 Severity Noted Reaction Type Reactions Bactrim [Sulfamethoprim Ds]  03/30/2012    Hives Clindamycin  03/30/2012   Side Effect Hives Current Immunizations  Reviewed on 3/11/2017 Name Date Influenza Vaccine 11/5/2016, 12/3/2014 Td 2/10/2004 Not reviewed this visit You Were Diagnosed With   
  
 Codes Comments Cellulitis and abscess of leg    -  Primary ICD-10-CM: L02.419, L03.119 ICD-9-CM: 892. 6 Obesity, Class III, BMI 40-49.9 (morbid obesity) (HCC)     ICD-10-CM: E66.01 
ICD-9-CM: 278.01 Encounter for immunization     ICD-10-CM: S18 ICD-9-CM: V03.89 Osteopenia, unspecified location     ICD-10-CM: M85.80 ICD-9-CM: 733.90 Vitals BP Pulse Temp Resp Height(growth percentile) Weight(growth percentile) 125/65 (BP 1 Location: Right arm, BP Patient Position: Sitting) 72 98.4 °F (36.9 °C) (Oral) 16 5' 1\" (1.549 m) 245 lb 9.6 oz (111.4 kg) LMP SpO2 BMI OB Status Smoking Status 03/02/2001 94% 46.41 kg/m2 Postmenopausal Never Smoker BMI and BSA Data Body Mass Index Body Surface Area  
 46.41 kg/m 2 2.19 m 2 Preferred Pharmacy Pharmacy Name Phone [facility] [phone] [facility] [phone] Your Updated Medication List  
  
 This list is accurate as of: 17  7:55 PM.  Always use your most recent med list.  
  
  
  
  
 * albuterol 90 mcg/actuation inhaler Commonly known as:  PROAIR HFA Take 2 Puffs by inhalation every six (6) hours as needed for Wheezing or Shortness of Breath. * albuterol 2.5 mg /3 mL (0.083 %) nebulizer solution Commonly known as:  PROVENTIL VENTOLIN  
3 mL by Nebulization route every four (4) hours as needed for Wheezing. aspirin delayed-release 81 mg tablet Take 81 mg by mouth daily. cephALEXin 500 mg capsule Commonly known as:  Fernie Fraction Take 1 Cap by mouth four (4) times daily for 10 days. fluticasone-salmeterol 250-50 mcg/dose diskus inhaler Commonly known as:  ADVAIR Take 1 Puff by inhalation every twelve (12) hours. furosemide 20 mg tablet Commonly known as:  LASIX Take 1 Tab by mouth daily. hydrOXYzine HCl 50 mg tablet Commonly known as:  ATARAX Take 1 Tab by mouth three (3) times daily as needed for Itching for up to 10 days. montelukast 10 mg tablet Commonly known as:  SINGULAIR  
TAKE ONE TABLET BY MOUTH DAILY FOR ALLERGIES AND ASTHMA pneumococcal 13 janell conj dip 0.5 mL Syrg injection Commonly known as:  PREVNAR-13  
0.5 mL by IntraMUSCular route once for 1 dose. SYMBICORT 160-4.5 mcg/actuation HFA inhaler Generic drug:  budesonide-formoterol Take 2 Puffs by inhalation two (2) times a day. TYLENOL 325 mg tablet Generic drug:  acetaminophen Take  by mouth every four (4) hours as needed for Pain. * Notice: This list has 2 medication(s) that are the same as other medications prescribed for you. Read the directions carefully, and ask your doctor or other care provider to review them with you. Prescriptions Sent to Pharmacy Refills  
 pneumococcal 13 janell conj dip (PREVNAR-13) 0.5 mL syrg injection 0 Si.5 mL by IntraMUSCular route once for 1 dose.   
 Class: Normal  
 Pharmacy: [address] [facility] #: [phone] Route: IntraMUSCular Follow-up Instructions Return in about 4 months (around 8/12/2017) for physical.  
  
  
Introducing [facility] & [facility]! Dear Jonathan Gudino: Thank you for requesting a Message Missile account. Our records indicate that you already have an active Message Missile account. You can access your account anytime at [email or web address] Did you know that you can access your hospital and ER discharge instructions at any time in Message Missile? You can also review all of your test results from your hospital stay or ER visit. Additional Information If you have questions, please visit the Frequently Asked Questions section of the Message Missile website at [email or web address] Remember, Message Missile is NOT to be used for urgent needs. For medical emergencies, dial 911. Now available from your iPhone and Android! Please provide this summary of care documentation to your next provider. Your primary care clinician is listed as Maylin Yo. If you have any questions after today's visit, please call [phone].

## 2017-04-12 NOTE — PROGRESS NOTES
Chief Complaint   Patient presents with    Skin Infection     cellulitis 1 week follow up    Follow-up     1. Have you been to the ER, urgent care clinic since your last visit? Hospitalized since your last visit? No    2. Have you seen or consulted any other health care providers outside of the [address] [place] [address] since your last visit? Include any pap smears or colon screening.  No  \"REVIEWED RECORD IN PREPARATION FOR VISIT AND HAVE OBTAINED THE NECESSARY DOCUMENTATION\"

## 2017-04-12 NOTE — PROGRESS NOTES
HISTORY OF PRESENT ILLNESS  Ami Lopez is a 79 y.o. female. Blood pressure 125/65, pulse 72, temperature 98.4 °F (36.9 °C), temperature source Oral, resp. rate 16, height 5' 1\" (1.549 m), weight 245 lb 9.6 oz (111.4 kg), last menstrual period 03/02/2001, SpO2 94 %. Body mass index is 46.41 kg/(m^2). Chief Complaint   Patient presents with    Skin Infection     cellulitis 1 week follow up    Follow-up      HPI   Ami Lopez 79 y.o. female  presents to the office today for a follow up on cellulitis. Bp at office today 125/65. Cellulitis: Recall pt was seen at office on 04/05/17 for cellulitis of left LE. She was given Rocephin IM at office and advised to start Keflex. Pt states her symptoms has improved since last visit and there is no longer any associated skin irritation, warmth, or redness. Pt has been applying warm compresses to her legs with improvement. Cellulitis has greatly improved. Continue with symptomatic treatment as needed. Health maintenance: Pt also notes her breathing issues has been doing well since starting the Symbicort inhaler. I have advised pt to work on losing weight through diet and exercise. Pt is due for Prevnar-13 vaccine. Prescription sent to pharmacy today. Pt notes she is up to date with mammogram and DEXA scan. Her DEXA scan on 03/21/17 was notable for osteopenia so I have advised pt to start calcium and Vitamin D supplementation as a preventive measure. Current Outpatient Prescriptions   Medication Sig Dispense Refill    budesonide-formoterol (SYMBICORT) 160-4.5 mcg/actuation HFA inhaler Take 2 Puffs by inhalation two (2) times a day.  albuterol (PROVENTIL VENTOLIN) 2.5 mg /3 mL (0.083 %) nebulizer solution 3 mL by Nebulization route every four (4) hours as needed for Wheezing.  72 Each 1    montelukast (SINGULAIR) 10 mg tablet TAKE ONE TABLET BY MOUTH DAILY FOR ALLERGIES AND ASTHMA 90 Tab 1    cephALEXin (KEFLEX) 500 mg capsule Take 1 Cap by mouth four (4) times daily for 10 days. 40 Cap 0    fluticasone-salmeterol (ADVAIR) 250-50 mcg/dose diskus inhaler Take 1 Puff by inhalation every twelve (12) hours. 1 Inhaler 0    hydrOXYzine HCl (ATARAX) 50 mg tablet Take 1 Tab by mouth three (3) times daily as needed for Itching for up to 10 days. 30 Tab 0    furosemide (LASIX) 20 mg tablet Take 1 Tab by mouth daily. 30 Tab 0    acetaminophen (TYLENOL) 325 mg tablet Take  by mouth every four (4) hours as needed for Pain.  albuterol (PROAIR HFA) 90 mcg/actuation inhaler Take 2 Puffs by inhalation every six (6) hours as needed for Wheezing or Shortness of Breath. 1 Inhaler 5    aspirin delayed-release 81 mg tablet Take 81 mg by mouth daily. Allergies   Allergen Reactions    Bactrim [Sulfamethoprim Ds] Hives    Clindamycin Hives     Past Medical History:   Diagnosis Date    AR (allergic rhinitis) 2010    Chronic Venous Stasis BLE 10/14/2010    DJD (degenerative joint disease) of knee 2010    Factor V Leiden mutation ([facility] [address] 2010    HTN (hypertension) 2010    Plantar fasciitis 2010    Postmenopausal 2010    Stasis, venous 2010    Stroke ([facility] [address]     T. I.A. 2010    Venous stasis dermatitis 10/14/2010     Past Surgical History:   Procedure Laterality Date    HX BACK SURGERY      lumbar disc    HX  SECTION       Family History   Problem Relation Age of Onset   [de-identified] Asthma Sister     Asthma Brother     MS Daughter     Allergic Rhinitis Son     Thyroid Disease Daughter      Social History   Substance Use Topics    Smoking status: Never Smoker    Smokeless tobacco: Never Used    Alcohol use No        Review of Systems   Constitutional: Negative. Negative for malaise/fatigue. Eyes: Negative for blurred vision. Respiratory: Negative for shortness of breath. Cardiovascular: Negative for chest pain and leg swelling. Musculoskeletal: Negative. Neurological: Negative.   Negative for dizziness and headaches. All other systems reviewed and are negative. Physical Exam   Constitutional: She is oriented to person, place, and time. She appears well-developed and well-nourished. No distress. HENT:   Head: Normocephalic and atraumatic. Neck: Carotid bruit is not present. Cardiovascular: Normal rate, regular rhythm, normal heart sounds and intact distal pulses. Exam reveals no gallop and no friction rub. No murmur heard. Pulmonary/Chest: Effort normal and breath sounds normal. No respiratory distress. She has no wheezes. She has no rales. Musculoskeletal: She exhibits no edema. Neurological: She is alert and oriented to person, place, and time. Skin: She is not diaphoretic. Psychiatric: She has a normal mood and affect. Her behavior is normal. Judgment and thought content normal.   Nursing note and vitals reviewed. ASSESSMENT and PLAN  [place]  was seen today for skin infection and follow-up. Diagnoses and all orders for this visit:    Cellulitis and abscess of  left leg  Has greatly improved after course of Keflex. Continue with warm compresses as needed. Obesity, Class III, BMI 40-49.9 (morbid obesity) ([facility] [address]  I have reviewed/discussed the above normal BMI with the patient. I have recommended the following interventions: dietary management education, guidance, and counseling, encourage exercise, monitor weight and prescribed dietary intake . Encounter for immunization  -     pneumococcal 13 janell conj dip (PREVNAR-13) 0.5 mL syrg injection; 0.5 mL by IntraMUSCular route once for 1 dose. Osteopenia, unspecified location  DEXA scan on 03/21/17 notable for osteopenia. Advised pt to start calcium and Vitamin D supplementation as a preventive measure. Follow-up Disposition:  Return in about 4 months (around 8/12/2017) for physical.     Medication risks/benefits/costs/interactions/alternatives discussed with patient.   Advised patient to call back or return to office if symptoms worsen/change/persist.  If patient cannot reach us or should anything more severe/urgent arise he/she should proceed directly to the nearest emergency department. Discussed expected course/resolution/complications of diagnosis in detail with patient. Patient given a written after visit summary which includes her diagnoses, current medications and vitals. Patient expressed understanding with the diagnosis and plan. Written by elvira Honeycutt, as dictated by Roni Casanova M.D.    I have reviewed and agree with the above note and have made corrections where appropriate, Dr. Riki Mata MD

## 2017-05-31 ENCOUNTER — OFFICE VISIT (OUTPATIENT)
Dept: FAMILY MEDICINE CLINIC | Age: 67
End: 2017-05-31

## 2017-05-31 VITALS
DIASTOLIC BLOOD PRESSURE: 88 MMHG | OXYGEN SATURATION: 96 % | RESPIRATION RATE: 16 BRPM | TEMPERATURE: 97.9 F | SYSTOLIC BLOOD PRESSURE: 138 MMHG | BODY MASS INDEX: 46.82 KG/M2 | WEIGHT: 248 LBS | HEART RATE: 76 BPM | HEIGHT: 61 IN

## 2017-05-31 DIAGNOSIS — M25.561 ACUTE PAIN OF RIGHT KNEE: Primary | ICD-10-CM

## 2017-05-31 DIAGNOSIS — M25.461 SWELLING OF RIGHT KNEE JOINT: ICD-10-CM

## 2017-05-31 RX ORDER — TRAMADOL HYDROCHLORIDE 50 MG/1
50 TABLET ORAL
Qty: 15 TAB | Refills: 0 | Status: SHIPPED | OUTPATIENT
Start: 2017-05-31 | End: 2018-08-13 | Stop reason: ALTCHOICE

## 2017-05-31 NOTE — PROGRESS NOTES
Chief Complaint   Patient presents with    Knee Pain     right knee pain, throbbing knee pain at night x 1 month     \"REVIEWED RECORD IN PREPARATION FOR VISIT AND HAVE OBTAINED THE NECESSARY DOCUMENTATION\"

## 2017-05-31 NOTE — PROGRESS NOTES
HISTORY OF PRESENT ILLNESS  Dominique Lopez is a 79 y.o. female. Blood pressure 150/81, pulse 76, temperature 97.9 °F (36.6 °C), temperature source Oral, resp. rate 16, height 5' 1\" (1.549 m), weight 248 lb (112.5 kg), last menstrual period 03/02/2001, SpO2 96 %. Body mass index is 46.86 kg/(m^2). Chief Complaint   Patient presents with    Knee Pain     right knee pain, throbbing knee pain at night x 1 month      HPI   Dominique Lopez 79 y.o. female  presents to the office today with acute complaint of right knee pain. Bp at office today 138/88 with manual arm cuff recheck. R knee pain: Pt complains of right knee pain for past month. Pt describes the pain as \"throbbing\" and notes it is onset at night. Will defer cortisone injection to right knee today, pt needs further investigation. I have also advised pt to complete XR of right knee to rule out any abnormality and to take Tramadol as needed for her pain.       After returning from [place] pt fell down and hurt her right knee  Knee is 8/10   Continuous  Dull to sharp pain  Hurts with walking  Relieved with rest        [facility] PRACTICE  OFFICE PROCEDURE PROGRESS NOTE        Chart reviewed for the following:   IDelmy, have reviewed the History, Physical and updated the Allergic reactions for [address] performed immediately prior to start of procedure:   IFeliciano, have performed the following reviews on Dominique Lopez prior to the start of the procedure:            * Patient was identified by name and date of birth   * Agreement on procedure being performed was verified  * Risks and Benefits explained to the patient  * Procedure site verified and marked as necessary  * Patient was positioned for comfort  * Consent was signed and verified     Time: 2010 PM      Date of procedure: 5/31/2017    Procedure performed by:  Jam Lin MD    Provider assisted by: self     Patient assisted by: self    How tolerated by patient: tolerated the procedure well with no complications    Post Procedural Pain Scale: 0 - No Hurt    Comments: none          Current Outpatient Prescriptions   Medication Sig Dispense Refill    budesonide-formoterol (SYMBICORT) 160-4.5 mcg/actuation HFA inhaler Take 2 Puffs by inhalation two (2) times a day.  albuterol (PROVENTIL VENTOLIN) 2.5 mg /3 mL (0.083 %) nebulizer solution 3 mL by Nebulization route every four (4) hours as needed for Wheezing. 72 Each 1    montelukast (SINGULAIR) 10 mg tablet TAKE ONE TABLET BY MOUTH DAILY FOR ALLERGIES AND ASTHMA 90 Tab 1    fluticasone-salmeterol (ADVAIR) 250-50 mcg/dose diskus inhaler Take 1 Puff by inhalation every twelve (12) hours. 1 Inhaler 0    furosemide (LASIX) 20 mg tablet Take 1 Tab by mouth daily. 30 Tab 0    acetaminophen (TYLENOL) 325 mg tablet Take  by mouth every four (4) hours as needed for Pain.  albuterol (PROAIR HFA) 90 mcg/actuation inhaler Take 2 Puffs by inhalation every six (6) hours as needed for Wheezing or Shortness of Breath. 1 Inhaler 5    aspirin delayed-release 81 mg tablet Take 81 mg by mouth daily. Allergies   Allergen Reactions    Bactrim [Sulfamethoprim Ds] Hives    Clindamycin Hives     Past Medical History:   Diagnosis Date    AR (allergic rhinitis) 2010    Chronic Venous Stasis BLE 10/14/2010    DJD (degenerative joint disease) of knee 2010    Factor V Leiden mutation ([facility] [address] 2010    HTN (hypertension) 2010    Plantar fasciitis 2010    Postmenopausal 2010    Stasis, venous 2010    Stroke ([address]     T. I.A. 2010    Venous stasis dermatitis 10/14/2010     Past Surgical History:   Procedure Laterality Date    HX BACK SURGERY      lumbar disc    HX  SECTION       Family History   Problem Relation Age of Onset   Kenneth Badillo Asthma Sister     Asthma Brother     MS Daughter     Allergic Rhinitis Son     Thyroid Disease Daughter      Social History   Substance Use Topics    Smoking status: Never Smoker    Smokeless tobacco: Never Used    Alcohol use No        Review of Systems   Constitutional: Negative. Negative for malaise/fatigue. Eyes: Negative for blurred vision. Respiratory: Negative for shortness of breath. Cardiovascular: Negative for chest pain and leg swelling. Musculoskeletal: Positive for joint pain (R knee with associated swelling). Neurological: Negative. Negative for dizziness and headaches. All other systems reviewed and are negative. Physical Exam   Constitutional: She is oriented to person, place, and time. She appears well-developed and well-nourished. No distress. HENT:   Head: Normocephalic and atraumatic. Neck: Carotid bruit is not present. Cardiovascular: Normal rate, regular rhythm, normal heart sounds and intact distal pulses. Exam reveals no gallop and no friction rub. No murmur heard. Pulmonary/Chest: Effort normal and breath sounds normal. No respiratory distress. She has no wheezes. She has no rales. Musculoskeletal: She exhibits no edema. Right knee: She exhibits decreased range of motion and swelling (slight). Tenderness found. Right knee: slight crepitations    Neurological: She is alert and oriented to person, place, and time. Skin: She is not diaphoretic. Psychiatric: She has a normal mood and affect. Her behavior is normal. Judgment and thought content normal.   Nursing note and vitals reviewed. ASSESSMENT and PLAN  Jailene Mansfield was seen today for knee pain. Diagnoses and all orders for this visit:    Acute pain of right knee  -     XR KNEE RT MIN 4 V; Future  -     XR KNEE RT MIN 4 V; Future  -     traMADol (ULTRAM) 50 mg tablet; Take 1 Tab by mouth nightly as needed for Pain. Max Daily Amount: 50 mg. Indications: Pain  - Will complete XR of right knee for further evaluation. Advised pt to take Tramadol as needed for pain.  Pt to notify me if symptoms progress or fail to improve.  was reviewed and verified    Swelling of right knee joint  -     XR KNEE RT MIN 4 V; Future  -     traMADol (ULTRAM) 50 mg tablet; Take 1 Tab by mouth nightly as needed for Pain. Max Daily Amount: 50 mg. Indications: Pain  - See above       Follow-up Disposition:  Return if symptoms worsen or fail to improve. Medication risks/benefits/costs/interactions/alternatives discussed with patient. Advised patient to call back or return to office if symptoms worsen/change/persist.  If patient cannot reach us or should anything more severe/urgent arise he/she should proceed directly to the nearest emergency department. Discussed expected course/resolution/complications of diagnosis in detail with patient. Patient given a written after visit summary which includes her diagnoses, current medications and vitals. Patient expressed understanding with the diagnosis and plan. Written by elvira Vail, as dictated by Henry Murphy M.D.    I have reviewed and agree with the above note and have made corrections where appropriate, Dr. Danny Perez MD

## 2017-06-07 ENCOUNTER — TELEPHONE (OUTPATIENT)
Dept: FAMILY MEDICINE CLINIC | Age: 67
End: 2017-06-07

## 2017-06-07 NOTE — TELEPHONE ENCOUNTER
[facility]      -  [phone]     -    Dr. Loki Berger needs copy of knee xray faxed to [phone]-  Office [phone]

## 2017-08-14 ENCOUNTER — OFFICE VISIT (OUTPATIENT)
Dept: FAMILY MEDICINE CLINIC | Age: 67
End: 2017-08-14

## 2017-08-14 VITALS
RESPIRATION RATE: 18 BRPM | OXYGEN SATURATION: 97 % | WEIGHT: 238 LBS | HEIGHT: 61 IN | TEMPERATURE: 97.5 F | HEART RATE: 72 BPM | BODY MASS INDEX: 44.93 KG/M2 | DIASTOLIC BLOOD PRESSURE: 80 MMHG | SYSTOLIC BLOOD PRESSURE: 130 MMHG

## 2017-08-14 DIAGNOSIS — R60.9 EDEMA, UNSPECIFIED TYPE: ICD-10-CM

## 2017-08-14 DIAGNOSIS — Z23 ENCOUNTER FOR IMMUNIZATION: ICD-10-CM

## 2017-08-14 DIAGNOSIS — J45.41 MODERATE PERSISTENT ASTHMA WITH ACUTE EXACERBATION: ICD-10-CM

## 2017-08-14 DIAGNOSIS — H26.9 CATARACT, UNSPECIFIED CATARACT TYPE, UNSPECIFIED LATERALITY: ICD-10-CM

## 2017-08-14 DIAGNOSIS — Z23 NEED FOR TDAP VACCINATION: ICD-10-CM

## 2017-08-14 DIAGNOSIS — I10 ESSENTIAL HYPERTENSION: ICD-10-CM

## 2017-08-14 DIAGNOSIS — Z00.00 PHYSICAL EXAM: Primary | ICD-10-CM

## 2017-08-14 RX ORDER — MONTELUKAST SODIUM 10 MG/1
TABLET ORAL
Qty: 90 TAB | Refills: 1 | Status: SHIPPED | OUTPATIENT
Start: 2017-08-14 | End: 2018-10-26 | Stop reason: SDUPTHER

## 2017-08-14 RX ORDER — FUROSEMIDE 20 MG/1
20-40 TABLET ORAL DAILY
Qty: 60 TAB | Refills: 0 | Status: SHIPPED | OUTPATIENT
Start: 2017-08-14 | End: 2021-08-03

## 2017-08-14 NOTE — MR AVS SNAPSHOT
Visit Information Date & Time Provider Department Dept. Phone Encounter #  
 8/14/2017  3:15 PM Leonia Canavan, MD [address] [place] [address] [phone] [id number] Follow-up Instructions Return in about 6 months (around 2/14/2018) for hypertension follow up. Upcoming Health Maintenance Date Due DTaP/Tdap/Td series (1 - Tdap) 2/11/2004 ZOSTER VACCINE AGE 60> 2/8/2010 GLAUCOMA SCREENING Q2Y 4/8/2015 Pneumococcal 65+ Low/Medium Risk (1 of 2 - PCV13) 4/8/2015 INFLUENZA AGE 9 TO ADULT 8/1/2017 MEDICARE YEARLY EXAM 8/11/2018 BREAST CANCER SCRN MAMMOGRAM 3/21/2019 COLONOSCOPY 7/8/2021 Allergies as of 8/14/2017  Review Complete On: 8/14/2017 By: Leonia Canavan, MD  
  
 Severity Noted Reaction Type Reactions Bactrim [Sulfamethoprim Ds]  03/30/2012    Hives Clindamycin  03/30/2012   Side Effect Hives Current Immunizations  Reviewed on 8/14/2017 Name Date Influenza Vaccine 11/5/2016, 12/3/2014 Td 2/10/2004 Tdap  Incomplete Reviewed by Leonia Canavan, MD on 8/14/2017 at  4:38 PM  
You Were Diagnosed With   
  
 Codes Comments Physical exam    -  Primary ICD-10-CM: Z00.00 ICD-9-CM: V70.9 Essential hypertension     ICD-10-CM: I10 
ICD-9-CM: 401.9 Need for Tdap vaccination     ICD-10-CM: Q69 ICD-9-CM: V06.1 Encounter for immunization     ICD-10-CM: L59 ICD-9-CM: V03.89 Edema, unspecified type     ICD-10-CM: R60.9 ICD-9-CM: 782.3 Moderate persistent asthma with acute exacerbation     ICD-10-CM: J45.41 
ICD-9-CM: 493.92 Vitals BP Pulse Temp Resp Height(growth percentile) Weight(growth percentile) 130/80 72 97.5 °F (36.4 °C) (Oral) 18 5' 1\" (1.549 m) 238 lb (108 kg) LMP SpO2 BMI OB Status Smoking Status 03/02/2001 97% 44.97 kg/m2 Postmenopausal Never Smoker Vitals History BMI and BSA Data Body Mass Index Body Surface Area 44.97 kg/m 2 2.16 m 2 Preferred Pharmacy Pharmacy Name Phone [facility] [phone] [facility] [phone] Your Updated Medication List  
  
   
This list is accurate as of: 17  4:52 PM.  Always use your most recent med list.  
  
  
  
  
 * albuterol 90 mcg/actuation inhaler Commonly known as:  PROAIR HFA Take 2 Puffs by inhalation every six (6) hours as needed for Wheezing or Shortness of Breath. * albuterol 2.5 mg /3 mL (0.083 %) nebulizer solution Commonly known as:  PROVENTIL VENTOLIN  
3 mL by Nebulization route every four (4) hours as needed for Wheezing. aspirin delayed-release 81 mg tablet Take 81 mg by mouth daily. furosemide 20 mg tablet Commonly known as:  LASIX Take 1-2 Tabs by mouth daily. montelukast 10 mg tablet Commonly known as:  SINGULAIR  
TAKE ONE TABLET BY MOUTH DAILY FOR ALLERGIES AND ASTHMA  
  
 SYMBICORT 160-4.5 mcg/actuation HFA inhaler Generic drug:  budesonide-formoterol Take 2 Puffs by inhalation two (2) times a day. traMADol 50 mg tablet Commonly known as:  ULTRAM  
Take 1 Tab by mouth nightly as needed for Pain. Max Daily Amount: 50 mg. Indications: Pain TYLENOL 325 mg tablet Generic drug:  acetaminophen Take  by mouth every four (4) hours as needed for Pain.  
  
 varicella zoster vacine live 19,400 unit/0.65 mL Susr injection Commonly known as:  varicella-zoster vacine live 1 Vial by SubCUTAneous route once for 1 dose. * Notice: This list has 2 medication(s) that are the same as other medications prescribed for you. Read the directions carefully, and ask your doctor or other care provider to review them with you. Prescriptions Sent to Pharmacy Refills  
 varicella zoster vacine live (VARICELLA-ZOSTER VACINE LIVE) 19,400 unit/0.65 mL susr injection 0 Si Vial by SubCUTAneous route once for 1 dose.   
 Class: Normal  
 Pharmacy: [address] Ph #: [phone] Route: SubCUTAneous  
 furosemide (LASIX) 20 mg tablet 0 Sig: Take 1-2 Tabs by mouth daily. Class: Normal  
 Pharmacy: [address] Ph #: [phone] Route: Oral  
 montelukast (SINGULAIR) 10 mg tablet 1 Sig: TAKE ONE TABLET BY MOUTH DAILY FOR ALLERGIES AND ASTHMA Class: Normal  
 Pharmacy: [address] Ph #: [phone] We Performed the Following AMB POC EKG ROUTINE W/ 12 LEADS, INTER & REP [18353 CPT(R)] CBC WITH AUTOMATED DIFF [15098 CPT(R)] LIPID PANEL [32971 CPT(R)] METABOLIC PANEL, COMPREHENSIVE [48621 CPT(R)] T4, FREE [id number] CPT(R)] TETANUS, DIPHTHERIA TOXOIDS AND ACELLULAR PERTUSSIS VACCINE (TDAP), IN INDIVIDS. >=7, IM [id number] CPT(R)] TSH 3RD GENERATION [04951 CPT(R)] URINALYSIS W/MICROSCOPIC [03423 CPT(R)] VITAMIN D, 25 HYDROXY [id number] CPT(R)] Follow-up Instructions Return in about 6 months (around 2/14/2018) for hypertension follow up. Patient Instructions Learning About Obesity What is obesity? Obesity means having so much body fat that your health is in danger. Having too much body fat can lead to type 2 diabetes, heart disease, high blood pressure, arthritis, sleep apnea, and stroke. Even if you don't feel bad now, think about these health risks. Do they seem like a good reason to start on a new path toward a healthier weight? Or do you have another personal, powerful reason for wanting to lose weight? Whatever it is, keep it in mind. It can be hard to change eating habits and exercise habits. But with your own reason and plan, you can do it. How do you know if your weight is in the obesity range? To know if your weight is in the obesity range, your doctor looks at your body mass index (BMI) and waist size. Your BMI is a number that is calculated from your weight and your height. To figure your BMI for yourself, get a BMI table from your doctor or use an online tool, such as http://www.ibarra.com/ on the [employer] of [employer]. What causes obesity? When you take in more calories than you burn off, you gain weight. How you eat, how active you are, and other things affect how your body uses calories and whether you gain weight. If you have family members who have too much body fat, you may have inherited a tendency to gain weight. And your family also helps form your eating and lifestyle habits, which can lead to obesity. Also, our busy lives make it harder to plan and cook healthy meals. For many of us, it's easier to reach for prepared foods, go out to eat, or go to the drive-through. But these foods are often high in saturated fat and calories. Portions are often too large. What can you do to reach a healthy weight? Focus on health, not diets. Diets are hard to stay on and don't work in the long run. It is very hard to stay with a diet that includes lots of big changes in your eating habits. Instead of a diet, focus on lifestyle changes that will improve your health and achieve the right balance of energy and calories. To lose weight, you need to burn more calories than you take in. You can do it by eating healthy foods in reasonable amounts and becoming more active, even a little bit every day. Making small changes over time can add up to a lot. Make a plan for change. Many people have found that naming their reasons for change and staying focused on their plan can make a big difference. Work with your doctor to create a plan that is right for you. · Ask yourself: Jorge Alberto Shelton are my personal, most powerful reasons for wanting this change? What will my life look like when I've made the change? \" · Set your long-term goal. Make it specific, such as \"I will lose x pounds. \" 
· Break your long-term goal into smaller, short-term goals. Make these small steps specific and within your reach, things you know you can do. These steps are what keep you going from day to day. How can you stay on your plan for change? Be ready. Choose to start during a time when there are few events that might trigger slip-ups, like holidays, social events, and high-stress periods. Decide on your first few steps. Most people have more success when they make small changes, one step at a time. For example, you might switch a daily candy bar to a piece of fruit, walk 10 minutes more, or add more vegetables to a meal. 
Line up your support people. Make sure you're not going to be alone as you make this change. Connect with people who understand how important it is to you. Ask family members and friends for help in keeping with your plan. And think about who could make it harder for you, and how to handle them. Try tracking. People who keep track of what they eat, feel, and do are better at losing weight. Try writing down things like: · What and how much you eat. · How you feel before and after each meal. 
· Details about each meal (like eating out or at home, eating alone, or with friends or family). · What you do to be active. Look and plan. As you track, look for patterns that you may want to change. Take note of: · When you eat and whether you skip meals. · How often you eat out. · How many fruits and vegetables you eat. · When you eat beyond feeling full. · When and why you eat for reasons other than being hungry. When you stray from your plan, don't get upset. Figure out what made you slip up and how you can fix it. Can you take medicines or have surgery to lose weight?  
Before your doctor will prescribe medicines or surgery, he or she will probably want you to be more active and follow your healthy eating plan for a period of time. These habits are key lifelong changes for managing your weight, with or without other medical treatment. And these changes can help you avoid weight-related health problems. Follow-up care is a key part of your treatment and safety. Be sure to make and go to all appointments, and call your doctor if you are having problems. It's also a good idea to know your test results and keep a list of the medicines you take. Where can you learn more? Go to http://alina-shanice.info/. Enter N111 in the search box to learn more about \"Learning About Obesity. \" Current as of: October 13, 2016 Content Version: 11.3 © 2136-8276 [employer]. Care instructions adapted under license by [employer] (which disclaims liability or warranty for this information). If you have questions about a medical condition or this instruction, always ask your healthcare professional. [address] any warranty or liability for your use of this information. Introducing [facility]! Dear Kenneth Quintero: Thank you for requesting a Facio account. Our records indicate that you already have an active Facio account. You can access your account anytime at [email or web address] Did you know that you can access your hospital and ER discharge instructions at any time in Facio? You can also review all of your test results from your hospital stay or ER visit. Additional Information If you have questions, please visit the Frequently Asked Questions section of the Facio website at [email or web address] Remember, Facio is NOT to be used for urgent needs. For medical emergencies, dial 911. Now available from your iPhone and Android! Please provide this summary of care documentation to your next provider. Your primary care clinician is listed as Fernando Ford.  If you have any questions after today's visit, please call [phone].

## 2017-08-14 NOTE — PATIENT INSTRUCTIONS
Learning About Obesity  What is obesity? Obesity means having so much body fat that your health is in danger. Having too much body fat can lead to type 2 diabetes, heart disease, high blood pressure, arthritis, sleep apnea, and stroke. Even if you don't feel bad now, think about these health risks. Do they seem like a good reason to start on a new path toward a healthier weight? Or do you have another personal, powerful reason for wanting to lose weight? Whatever it is, keep it in mind. It can be hard to change eating habits and exercise habits. But with your own reason and plan, you can do it. How do you know if your weight is in the obesity range? To know if your weight is in the obesity range, your doctor looks at your body mass index (BMI) and waist size. Your BMI is a number that is calculated from your weight and your height. To figure your BMI for yourself, get a BMI table from your doctor or use an online tool, such as http://www.[employer].com/ on the [employer] of [employer]. What causes obesity? When you take in more calories than you burn off, you gain weight. How you eat, how active you are, and other things affect how your body uses calories and whether you gain weight. If you have family members who have too much body fat, you may have inherited a tendency to gain weight. And your family also helps form your eating and lifestyle habits, which can lead to obesity. Also, our busy lives make it harder to plan and cook healthy meals. For many of us, it's easier to reach for prepared foods, go out to eat, or go to the drive-through. But these foods are often high in saturated fat and calories. Portions are often too large. What can you do to reach a healthy weight? Focus on health, not diets. Diets are hard to stay on and don't work in the long run.  It is very hard to stay with a diet that includes lots of big changes in your eating habits. Instead of a diet, focus on lifestyle changes that will improve your health and achieve the right balance of energy and calories. To lose weight, you need to burn more calories than you take in. You can do it by eating healthy foods in reasonable amounts and becoming more active, even a little bit every day. Making small changes over time can add up to a lot. Make a plan for change. Many people have found that naming their reasons for change and staying focused on their plan can make a big difference. Work with your doctor to create a plan that is right for you. · Ask yourself: Richard Quijano are my personal, most powerful reasons for wanting this change? What will my life look like when I've made the change? \"  · Set your long-term goal. Make it specific, such as \"I will lose x pounds. \"  · Break your long-term goal into smaller, short-term goals. Make these small steps specific and within your reach, things you know you can do. These steps are what keep you going from day to day. How can you stay on your plan for change? Be ready. Choose to start during a time when there are few events that might trigger slip-ups, like holidays, social events, and high-stress periods. Decide on your first few steps. Most people have more success when they make small changes, one step at a time. For example, you might switch a daily candy bar to a piece of fruit, walk 10 minutes more, or add more vegetables to a meal.  Line up your support people. Make sure you're not going to be alone as you make this change. Connect with people who understand how important it is to you. Ask family members and friends for help in keeping with your plan. And think about who could make it harder for you, and how to handle them. Try tracking. People who keep track of what they eat, feel, and do are better at losing weight. Try writing down things like:  · What and how much you eat.   · How you feel before and after each meal.  · Details about each meal (like eating out or at home, eating alone, or with friends or family). · What you do to be active. Look and plan. As you track, look for patterns that you may want to change. Take note of:  · When you eat and whether you skip meals. · How often you eat out. · How many fruits and vegetables you eat. · When you eat beyond feeling full. · When and why you eat for reasons other than being hungry. When you stray from your plan, don't get upset. Figure out what made you slip up and how you can fix it. Can you take medicines or have surgery to lose weight? Before your doctor will prescribe medicines or surgery, he or she will probably want you to be more active and follow your healthy eating plan for a period of time. These habits are key lifelong changes for managing your weight, with or without other medical treatment. And these changes can help you avoid weight-related health problems. Follow-up care is a key part of your treatment and safety. Be sure to make and go to all appointments, and call your doctor if you are having problems. It's also a good idea to know your test results and keep a list of the medicines you take. Where can you learn more? Go to http://alina-shanice.info/. Enter N111 in the search box to learn more about \"Learning About Obesity. \"  Current as of: October 13, 2016  Content Version: 11.3  © 9282-8679 [employer]. Care instructions adapted under license by [employer] (which disclaims liability or warranty for this information). If you have questions about a medical condition or this instruction, always ask your healthcare professional. [address] any warranty or liability for your use of this information.

## 2017-08-14 NOTE — PROGRESS NOTES
HISTORY OF PRESENT ILLNESS  Boyd Tyler is a 79 y.o. female. Blood pressure 130/80, pulse 72, temperature 97.5 °F (36.4 °C), temperature source Oral, resp. rate 18, height 5' 1\" (1.549 m), weight 238 lb (108 kg), last menstrual period 03/02/2001, SpO2 97 %. Body mass index is 44.97 kg/(m^2). Chief Complaint   Patient presents with    Complete Physical        HPI  Boyd Tyler 79 y.o. female  presents to the office today for complete physical.    Health Maintenance: Pt will come in later in the week for fasting blood work. Pt denies chest pain. Pt will have tetanus shot in office today. Pt is due for Shingles vaccine. Current Outpatient Prescriptions   Medication Sig Dispense Refill    varicella zoster vacine live (VARICELLA-ZOSTER VACINE LIVE) 19,400 unit/0.65 mL susr injection 1 Vial by SubCUTAneous route once for 1 dose. 0.65 mL 0    traMADol (ULTRAM) 50 mg tablet Take 1 Tab by mouth nightly as needed for Pain. Max Daily Amount: 50 mg. Indications: Pain 15 Tab 0    budesonide-formoterol (SYMBICORT) 160-4.5 mcg/actuation HFA inhaler Take 2 Puffs by inhalation two (2) times a day.  albuterol (PROVENTIL VENTOLIN) 2.5 mg /3 mL (0.083 %) nebulizer solution 3 mL by Nebulization route every four (4) hours as needed for Wheezing. 72 Each 1    montelukast (SINGULAIR) 10 mg tablet TAKE ONE TABLET BY MOUTH DAILY FOR ALLERGIES AND ASTHMA 90 Tab 1    fluticasone-salmeterol (ADVAIR) 250-50 mcg/dose diskus inhaler Take 1 Puff by inhalation every twelve (12) hours. 1 Inhaler 0    furosemide (LASIX) 20 mg tablet Take 1 Tab by mouth daily. 30 Tab 0    acetaminophen (TYLENOL) 325 mg tablet Take  by mouth every four (4) hours as needed for Pain.  albuterol (PROAIR HFA) 90 mcg/actuation inhaler Take 2 Puffs by inhalation every six (6) hours as needed for Wheezing or Shortness of Breath. 1 Inhaler 5    aspirin delayed-release 81 mg tablet Take 81 mg by mouth daily.        Allergies   Allergen Reactions  Bactrim [Sulfamethoprim Ds] Hives    Clindamycin Hives     Past Medical History:   Diagnosis Date    AR (allergic rhinitis) 2010    Chronic Venous Stasis BLE 10/14/2010    DJD (degenerative joint disease) of knee 2010    Factor V Leiden mutation ([facility] [address] 2010    HTN (hypertension) 2010    Plantar fasciitis 2010    Postmenopausal 2010    Stasis, venous 2010    Stroke ([facility] [address]     [employer] 2010    Venous stasis dermatitis 10/14/2010     Past Surgical History:   Procedure Laterality Date    HX BACK SURGERY      lumbar disc    HX  SECTION       Family History   Problem Relation Age of Onset   [facility] Asthma Sister     Asthma Brother     MS Daughter     Allergic Rhinitis Son     Thyroid Disease Daughter      Social History   Substance Use Topics    Smoking status: Never Smoker    Smokeless tobacco: Never Used    Alcohol use No        Review of Systems   Constitutional: Negative for chills, diaphoresis, fever, malaise/fatigue and weight loss. HENT: Negative for congestion, ear discharge, ear pain, hearing loss, nosebleeds, sore throat and tinnitus. Eyes: Negative for blurred vision, double vision, photophobia, pain, discharge and redness. Respiratory: Negative for cough, hemoptysis, sputum production, shortness of breath, wheezing and stridor. Cardiovascular: Negative for chest pain, palpitations, orthopnea, claudication, leg swelling and PND. Gastrointestinal: Negative for abdominal pain, blood in stool, constipation, diarrhea, heartburn, melena, nausea and vomiting. Genitourinary: Negative for dysuria, flank pain, frequency, hematuria and urgency. Musculoskeletal: Negative for back pain, falls, joint pain, myalgias and neck pain. Skin: Negative for itching and rash. Neurological: Negative for dizziness, tingling, tremors, sensory change, speech change, focal weakness, seizures, loss of consciousness, weakness and headaches. Endo/Heme/Allergies: Negative for environmental allergies and polydipsia. Does not bruise/bleed easily. Psychiatric/Behavioral: Negative for depression, hallucinations, memory loss, substance abuse and suicidal ideas. The patient is not nervous/anxious and does not have insomnia. All other systems reviewed and are negative. Physical Exam   Constitutional: She is oriented to person, place, and time. She appears well-developed and well-nourished. No distress. HENT:   Head: Normocephalic and atraumatic. Right Ear: External ear normal.   Left Ear: External ear normal.   Nose: Nose normal.   Mouth/Throat: Oropharynx is clear and moist. No oropharyngeal exudate. Eyes: Conjunctivae and EOM are normal. Pupils are equal, round, and reactive to light. Right eye exhibits no discharge. Left eye exhibits no discharge. No scleral icterus. Neck: Normal range of motion. Neck supple. No JVD present. No tracheal deviation present. No thyromegaly present. Cardiovascular: Normal rate, regular rhythm, normal heart sounds and intact distal pulses. Exam reveals no gallop and no friction rub. No murmur heard. Pulmonary/Chest: Effort normal and breath sounds normal. No stridor. No respiratory distress. She has no wheezes. She has no rales. She exhibits no tenderness. Abdominal: Soft. Bowel sounds are normal. She exhibits no distension and no mass. There is no tenderness. There is no rebound and no guarding. Musculoskeletal: Normal range of motion. She exhibits no edema or tenderness. Lymphadenopathy:     She has no cervical adenopathy. Neurological: She is alert and oriented to person, place, and time. She has normal reflexes. No cranial nerve deficit. She exhibits normal muscle tone. Coordination normal.   Skin: Skin is warm and dry. No rash noted. She is not diaphoretic. No erythema. No pallor. Psychiatric: She has a normal mood and affect.  Her behavior is normal. Judgment and thought content normal. Nursing note and vitals reviewed. ASSESSMENT and PLAN  Diagnoses and all orders for this visit:    1. Physical exam  -     AMB POC EKG ROUTINE W/ 12 LEADS, INTER & REP  -     METABOLIC PANEL, COMPREHENSIVE  -     CBC WITH AUTOMATED DIFF  -     LIPID PANEL  -     TSH 3RD GENERATION  -     T4, FREE  -     VITAMIN D, 25 HYDROXY  -     URINALYSIS W/MICROSCOPIC    2. Essential hypertension        - Bp is under adequate control, but want systolic bp to be under 002. Continue to monitor outside of office. 3. Need for Tdap vaccination  -     TETANUS, DIPHTHERIA TOXOIDS AND ACELLULAR PERTUSSIS VACCINE (TDAP), IN INDIVIDS. >=7, IM    4. Encounter for immunization  -     varicella zoster vacine live (VARICELLA-ZOSTER VACINE LIVE) 19,400 unit/0.65 mL susr injection; 1 Vial by SubCUTAneous route once for 1 dose. 5. Edema, unspecified type  -     furosemide (LASIX) 20 mg tablet; Take 1-2 Tabs by mouth daily.  - Stable, continue current regimen. 6. Moderate persistent asthma with acute exacerbation  -     montelukast (SINGULAIR) 10 mg tablet; TAKE ONE TABLET BY MOUTH DAILY FOR ALLERGIES AND ASTHMA  - Stable, continue current regimen. 7. Cataract, unspecified cataract type, unspecified laterality  -     REFERRAL TO OPHTHALMOLOGY (Dr. Juni Crawford)  - I have referred pt to Dr. Juni Crawford for further evaluation. Follow-up Disposition:  Return in about 6 months (around 2/14/2018). Medication risks/benefits/costs/interactions/alternatives discussed with patient. Advised patient to call back or return to office if symptoms worsen/change/persist.  If patient cannot reach us or should anything more severe/urgent arise he/she should proceed directly to the nearest emergency department. Discussed expected course/resolution/complications of diagnosis in detail with patient. Patient given a written after visit summary which includes her diagnoses, current medications and vitals.   Patient expressed understanding with the diagnosis and plan. Written by elvira Eckert, as dictated by Leonia Canavan, M.D.     I have reviewed and agree with the above note and have made corrections where appropriate, Dr. Adalid Mcmullen MD

## 2017-08-14 NOTE — LETTER
9/15/2017 11:37 AM 
 
Ms. Jeremy Morales [phone] Dear Jeremy Alexander: 
 
Please find your most recent results below. Resulted Orders METABOLIC PANEL, COMPREHENSIVE Result Value Ref Range Glucose 93 65 - 99 mg/dL BUN 21 8 - 27 mg/dL Creatinine 0.58 0.57 - 1.00 mg/dL GFR est non-AA 96 >59 mL/min/1.73 GFR est  >59 mL/min/1.73  
 BUN/Creatinine ratio 36 (H) 12 - 28 Sodium 143 134 - 144 mmol/L Potassium 4.6 3.5 - 5.2 mmol/L Chloride 103 96 - 106 mmol/L  
 CO2 22 18 - 29 mmol/L Calcium 9.6 8.7 - 10.3 mg/dL Protein, total 6.7 6.0 - 8.5 g/dL Albumin 4.6 3.6 - 4.8 g/dL GLOBULIN, TOTAL 2.1 1.5 - 4.5 g/dL A-G Ratio 2.2 1.2 - 2.2 Bilirubin, total 0.4 0.0 - 1.2 mg/dL Alk. phosphatase 81 39 - 117 IU/L  
 AST (SGOT) 18 0 - 40 IU/L  
 ALT (SGPT) 18 0 - 32 IU/L Narrative Performed at:  [address]  [id number] : oLre Mena MD, Phone:  [phone] CBC WITH AUTOMATED DIFF Result Value Ref Range WBC 6.9 3.4 - 10.8 x10E3/uL  
 RBC 4.96 3.77 - 5.28 x10E6/uL HGB 14.8 11.1 - 15.9 g/dL HCT 46.2 34.0 - 46.6 % MCV 93 79 - 97 fL  
 MCH 29.8 26.6 - 33.0 pg  
 MCHC 32.0 31.5 - 35.7 g/dL  
 RDW 13.8 12.3 - 15.4 % PLATELET 390 526 - 709 x10E3/uL NEUTROPHILS 62 % Lymphocytes 27 % MONOCYTES 8 % EOSINOPHILS 3 % BASOPHILS 0 %  
 ABS. NEUTROPHILS 4.3 1.4 - 7.0 x10E3/uL Abs Lymphocytes 1.9 0.7 - 3.1 x10E3/uL  
 ABS. MONOCYTES 0.6 0.1 - 0.9 x10E3/uL  
 ABS. EOSINOPHILS 0.2 0.0 - 0.4 x10E3/uL  
 ABS. BASOPHILS 0.0 0.0 - 0.2 x10E3/uL IMMATURE GRANULOCYTES 0 %  
 ABS. IMM. GRANS. 0.0 0.0 - 0.1 x10E3/uL Narrative Performed at:  [address]  [id number] : Lore Mena MD, Phone:  [phone] LIPID PANEL Result Value Ref Range Cholesterol, total 179 100 - 199 mg/dL Triglyceride 74 0 - 149 mg/dL HDL Cholesterol 49 >39 mg/dL VLDL, calculated 15 5 - 40 mg/dL LDL, calculated 115 (H) 0 - 99 mg/dL Narrative Performed at:  [address]  [id number] : Mikie Florez MD, Phone:  [phone] TSH 3RD GENERATION Result Value Ref Range TSH 3.520 0.450 - 4.500 uIU/mL Narrative Performed at:  [address]  [id number] : Mikie Florez MD, Phone:  [phone] T4, FREE Result Value Ref Range T4, Free 1.27 0.82 - 1.77 ng/dL Narrative Performed at:  [address]  [id number] : Mikie Florez MD, Phone:  [phone] VITAMIN D, 25 HYDROXY Result Value Ref Range VITAMIN D, 25-HYDROXY 35.6 30.0 - 100.0 ng/mL Comment:  
   Vitamin D deficiency has been defined by the [address] [place] [address] practice guideline as a 
level of serum 25-OH vitamin D less than 20 ng/mL (1,2). The Endocrine Society went on to further define vitamin D 
insufficiency as a level between 21 and 29 ng/mL (2). 1. IOM ([place] of [employer]). 2010. Dietary reference 
   intakes for calcium and D. [address] [place] [address]: The 
   [place]. 2. Michaela MF, Joan NC, Arlet ROCKWELL, et al. 
   Evaluation, treatment, and prevention of vitamin D 
   deficiency: an Endocrine Society clinical practice 
   guideline. JCEM. 2011 Jul; 96(7):1911-30. Narrative Performed at:  [address]  [id number] : Mikie Florez MD, Phone:  [phone] URINALYSIS W/MICROSCOPIC Result Value Ref Range Specific Gravity 1.017 1.005 - 1.030  
 pH (UA) 7.0 5.0 - 7.5 Color Yellow Yellow Appearance Cloudy (A) Clear Leukocyte Esterase Negative Negative Protein Negative Negative/Trace Glucose Negative Negative Ketone Negative Negative Blood Negative Negative Bilirubin Negative Negative Urobilinogen 0.2 0.2 - 1.0 mg/dL Nitrites Negative Negative Microscopic Examination Comment Comment:  
   Microscopic follows if indicated. Microscopic exam See additional order Comment:  
   Microscopic was indicated and was performed. Narrative Performed at:  [address]  [id number] : Lore Mena MD, Phone:  [phone] MICROSCOPIC EXAMINATION Result Value Ref Range WBC 0-5 0 - 5 /hpf  
 RBC 0-2 0 - 2 /hpf Epithelial cells 0-10 0 - 10 /hpf Casts None seen None seen /lpf Mucus Present Not Estab. Bacteria Moderate (A) None seen/Few Narrative Performed at:  [address]  [id number] : Lore Mena MD, Phone:  [phone] CVD REPORT Result Value Ref Range INTERPRETATION Note Comment:  
   Supplement report is available. Narrative Performed at:  [address] [place] [address]  [id number] : Noa Reyna PhD, Phone:  [phone] RECOMMENDATIONS: 
See My Chart for results. Work on diet and increase exercise. Continue present medications. Please call me if you have any questions: [phone] Sincerely, Ernesto Joshi MD

## 2017-08-14 NOTE — PROGRESS NOTES
Chief Complaint   Patient presents with    Complete Physical       Reviewed Record in preparation for visit and have obtained necessary documentation. Identified pt with two pt identifiers (Name @ )    Health Maintenance Due   Topic    DTaP/Tdap/Td series (1 - Tdap)    ZOSTER VACCINE AGE 60>     GLAUCOMA SCREENING Q2Y     Pneumococcal 65+ Low/Medium Risk (1 of 2 - PCV13)    INFLUENZA AGE 9 TO ADULT          1. Have you been to the ER, urgent care clinic since your last visit? Hospitalized since your last visit? No    2. Have you seen or consulted any other health care providers outside of the [facility] since your last visit? Include any pap smears or colon screening.  No

## 2017-08-17 LAB
25(OH)D3+25(OH)D2 SERPL-MCNC: 35.6 NG/ML (ref 30–100)
ALBUMIN SERPL-MCNC: 4.6 G/DL (ref 3.6–4.8)
ALBUMIN/GLOB SERPL: 2.2 {RATIO} (ref 1.2–2.2)
ALP SERPL-CCNC: 81 IU/L (ref 39–117)
ALT SERPL-CCNC: 18 IU/L (ref 0–32)
APPEARANCE UR: ABNORMAL
AST SERPL-CCNC: 18 IU/L (ref 0–40)
BACTERIA #/AREA URNS HPF: ABNORMAL /[HPF]
BASOPHILS # BLD AUTO: 0 X10E3/UL (ref 0–0.2)
BASOPHILS NFR BLD AUTO: 0 %
BILIRUB SERPL-MCNC: 0.4 MG/DL (ref 0–1.2)
BILIRUB UR QL STRIP: NEGATIVE
BUN SERPL-MCNC: 21 MG/DL (ref 8–27)
BUN/CREAT SERPL: 36 (ref 12–28)
CALCIUM SERPL-MCNC: 9.6 MG/DL (ref 8.7–10.3)
CASTS URNS QL MICRO: ABNORMAL /LPF
CHLORIDE SERPL-SCNC: 103 MMOL/L (ref 96–106)
CHOLEST SERPL-MCNC: 179 MG/DL (ref 100–199)
CO2 SERPL-SCNC: 22 MMOL/L (ref 18–29)
COLOR UR: YELLOW
CREAT SERPL-MCNC: 0.58 MG/DL (ref 0.57–1)
EOSINOPHIL # BLD AUTO: 0.2 X10E3/UL (ref 0–0.4)
EOSINOPHIL NFR BLD AUTO: 3 %
EPI CELLS #/AREA URNS HPF: ABNORMAL /HPF
ERYTHROCYTE [DISTWIDTH] IN BLOOD BY AUTOMATED COUNT: 13.8 % (ref 12.3–15.4)
GLOBULIN SER CALC-MCNC: 2.1 G/DL (ref 1.5–4.5)
GLUCOSE SERPL-MCNC: 93 MG/DL (ref 65–99)
GLUCOSE UR QL: NEGATIVE
HCT VFR BLD AUTO: 46.2 % (ref 34–46.6)
HDLC SERPL-MCNC: 49 MG/DL
HGB BLD-MCNC: 14.8 G/DL (ref 11.1–15.9)
HGB UR QL STRIP: NEGATIVE
IMM GRANULOCYTES # BLD: 0 X10E3/UL (ref 0–0.1)
IMM GRANULOCYTES NFR BLD: 0 %
INTERPRETATION, 910389: NORMAL
KETONES UR QL STRIP: NEGATIVE
LDLC SERPL CALC-MCNC: 115 MG/DL (ref 0–99)
LEUKOCYTE ESTERASE UR QL STRIP: NEGATIVE
LYMPHOCYTES # BLD AUTO: 1.9 X10E3/UL (ref 0.7–3.1)
LYMPHOCYTES NFR BLD AUTO: 27 %
MCH RBC QN AUTO: 29.8 PG (ref 26.6–33)
MCHC RBC AUTO-ENTMCNC: 32 G/DL (ref 31.5–35.7)
MCV RBC AUTO: 93 FL (ref 79–97)
MICRO URNS: ABNORMAL
MICRO URNS: ABNORMAL
MONOCYTES # BLD AUTO: 0.6 X10E3/UL (ref 0.1–0.9)
MONOCYTES NFR BLD AUTO: 8 %
MUCOUS THREADS URNS QL MICRO: PRESENT
NEUTROPHILS # BLD AUTO: 4.3 X10E3/UL (ref 1.4–7)
NEUTROPHILS NFR BLD AUTO: 62 %
NITRITE UR QL STRIP: NEGATIVE
PH UR STRIP: 7 [PH] (ref 5–7.5)
PLATELET # BLD AUTO: 241 X10E3/UL (ref 150–379)
POTASSIUM SERPL-SCNC: 4.6 MMOL/L (ref 3.5–5.2)
PROT SERPL-MCNC: 6.7 G/DL (ref 6–8.5)
PROT UR QL STRIP: NEGATIVE
RBC # BLD AUTO: 4.96 X10E6/UL (ref 3.77–5.28)
RBC #/AREA URNS HPF: ABNORMAL /HPF
SODIUM SERPL-SCNC: 143 MMOL/L (ref 134–144)
SP GR UR: 1.02 (ref 1–1.03)
T4 FREE SERPL-MCNC: 1.27 NG/DL (ref 0.82–1.77)
TRIGL SERPL-MCNC: 74 MG/DL (ref 0–149)
TSH SERPL DL<=0.005 MIU/L-ACNC: 3.52 UIU/ML (ref 0.45–4.5)
UROBILINOGEN UR STRIP-MCNC: 0.2 MG/DL (ref 0.2–1)
VLDLC SERPL CALC-MCNC: 15 MG/DL (ref 5–40)
WBC # BLD AUTO: 6.9 X10E3/UL (ref 3.4–10.8)
WBC #/AREA URNS HPF: ABNORMAL /HPF

## 2017-09-10 NOTE — PROGRESS NOTES
Inform pt to go to my chart to see results and recommendations      None. Keep up the good work! Work on diet and exercise. Continue with current  medications. Please work on exercise and weight loss.     See you as advised

## 2017-12-20 DIAGNOSIS — J45.909 BRONCHITIS WITH ASTHMA, SUBACUTE: ICD-10-CM

## 2017-12-20 DIAGNOSIS — J20.9 BRONCHITIS WITH ASTHMA, SUBACUTE: ICD-10-CM

## 2017-12-20 RX ORDER — ALBUTEROL SULFATE 90 UG/1
2 AEROSOL, METERED RESPIRATORY (INHALATION)
Qty: 1 INHALER | Refills: 5 | Status: SHIPPED | OUTPATIENT
Start: 2017-12-20 | End: 2018-08-13 | Stop reason: SDUPTHER

## 2018-02-12 ENCOUNTER — OFFICE VISIT (OUTPATIENT)
Dept: FAMILY MEDICINE CLINIC | Age: 68
End: 2018-02-12

## 2018-02-12 VITALS
WEIGHT: 227 LBS | HEIGHT: 61 IN | TEMPERATURE: 98 F | RESPIRATION RATE: 18 BRPM | BODY MASS INDEX: 42.86 KG/M2 | OXYGEN SATURATION: 98 % | HEART RATE: 73 BPM | SYSTOLIC BLOOD PRESSURE: 128 MMHG | DIASTOLIC BLOOD PRESSURE: 71 MMHG

## 2018-02-12 DIAGNOSIS — M79.89 SWELLING OF RIGHT MIDDLE FINGER: ICD-10-CM

## 2018-02-12 DIAGNOSIS — Z71.89 ACP (ADVANCE CARE PLANNING): ICD-10-CM

## 2018-02-12 DIAGNOSIS — I10 ESSENTIAL HYPERTENSION: ICD-10-CM

## 2018-02-12 DIAGNOSIS — Z23 ENCOUNTER FOR IMMUNIZATION: ICD-10-CM

## 2018-02-12 DIAGNOSIS — E66.01 OBESITY, CLASS III, BMI 40-49.9 (MORBID OBESITY) (HCC): ICD-10-CM

## 2018-02-12 DIAGNOSIS — D68.51 FACTOR V LEIDEN MUTATION (HCC): ICD-10-CM

## 2018-02-12 DIAGNOSIS — J45.20 MILD INTERMITTENT ASTHMA WITHOUT COMPLICATION: Primary | ICD-10-CM

## 2018-02-12 NOTE — ASSESSMENT & PLAN NOTE
Stable, based on history, physical exam and review of pertinent labs, studies and medications; meds reconciled; continue current treatment plan.   Lab Results   Component Value Date/Time    WBC 6.9 08/16/2017 08:01 AM    HGB 14.8 08/16/2017 08:01 AM    HCT 46.2 08/16/2017 08:01 AM    PLATELET 567 66/96/6393 08:01 AM    Creatinine 0.58 08/16/2017 08:01 AM    BUN 21 08/16/2017 08:01 AM    Potassium 4.6 08/16/2017 08:01 AM

## 2018-02-12 NOTE — PATIENT INSTRUCTIONS
Advance Directives: Care Instructions  Your Care Instructions  An advance directive is a legal way to state your wishes at the end of your life. It tells your family and your doctor what to do if you can no longer say what you want. There are two main types of advance directives. You can change them any time that your wishes change. · A living will tells your family and your doctor your wishes about life support and other treatment. · A durable power of  for health care lets you name a person to make treatment decisions for you when you can't speak for yourself. This person is called a health care agent. If you do not have an advance directive, decisions about your medical care may be made by a doctor or a  who doesn't know you. It may help to think of an advance directive as a gift to the people who care for you. If you have one, they won't have to make tough decisions by themselves. Follow-up care is a key part of your treatment and safety. Be sure to make and go to all appointments, and call your doctor if you are having problems. It's also a good idea to know your test results and keep a list of the medicines you take. How can you care for yourself at home? · Discuss your wishes with your loved ones and your doctor. This way, there are no surprises. · Many states have a unique form. Or you might use a universal form that has been approved by many states. This kind of form can sometimes be completed and stored online. Your electronic copy will then be available wherever you have a connection to the Internet. In most cases, doctors will respect your wishes even if you have a form from a different state. · You don't need a  to do an advance directive. But you may want to get legal advice. · Think about these questions when you prepare an advance directive:  ¨ Who do you want to make decisions about your medical care if you are not able to?  Many people choose a family member or close friend. ¨ Do you know enough about life support methods that might be used? If not, talk to your doctor so you understand. ¨ What are you most afraid of that might happen? You might be afraid of having pain, losing your independence, or being kept alive by machines. ¨ Where would you prefer to die? Choices include your home, a hospital, or a nursing home. ¨ Would you like to have information about hospice care to support you and your family? ¨ Do you want to donate organs when you die? ¨ Do you want certain [employer] practices performed before you die? If so, put your wishes in the advance directive. · Read your advance directive every year, and make changes as needed. When should you call for help? Be sure to contact your doctor if you have any questions. Where can you learn more? Go to http://alina-shanice.info/. Enter R264 in the search box to learn more about \"Advance Directives: Care Instructions. \"  Current as of: September 24, 2016  Content Version: 11.4  © 8106-8048 [employer]. Care instructions adapted under license by [employer] (which disclaims liability or warranty for this information). If you have questions about a medical condition or this instruction, always ask your healthcare professional. [address] any warranty or liability for your use of this information.

## 2018-02-12 NOTE — MR AVS SNAPSHOT
[address] [place] [address] 
[phone] Patient: John Worthington MRN: [id number] CHM:[birth year] Visit Information Date & Time Provider Department Dept. Phone Encounter #  
 2/12/2018  3:00 PM Polina Bear  [facility] [phone] [id number] Follow-up Instructions Return in about 6 months (around 8/12/2018) for chronic follow up. Upcoming Health Maintenance Date Due ZOSTER VACCINE AGE 60> 2/8/2010 Pneumococcal 65+ Low/Medium Risk (1 of 2 - PCV13) 4/8/2015 MEDICARE YEARLY EXAM 8/11/2018 BREAST CANCER SCRN MAMMOGRAM 3/21/2019 GLAUCOMA SCREENING Q2Y 9/13/2019 COLONOSCOPY 7/8/2021 DTaP/Tdap/Td series (2 - Td) 8/14/2027 Allergies as of 2/12/2018  Review Complete On: 2/12/2018 By: Polina Bear MD  
  
 Severity Noted Reaction Type Reactions Sulfasalazine Medium 06/16/2017    Hives Bactrim [Sulfamethoprim Ds]  03/30/2012    Hives Clindamycin  03/30/2012   Side Effect Hives Current Immunizations  Reviewed on 8/14/2017 Name Date Influenza Vaccine 11/5/2016, 12/3/2014 Td 2/10/2004 Tdap 8/14/2017 Not reviewed this visit You Were Diagnosed With   
  
 Codes Comments Mild intermittent asthma without complication    -  Primary ICD-10-CM: J45.20 ICD-9-CM: 493.90 Essential hypertension     ICD-10-CM: I10 
ICD-9-CM: 401.9 Obesity, Class III, BMI 40-49.9 (morbid obesity) (HCC)     ICD-10-CM: E66.01 
ICD-9-CM: 278.01 Encounter for immunization     ICD-10-CM: D70 ICD-9-CM: V03.89 Factor V Leiden mutation [facility])     ICD-10-CM: O44.70 
ICD-9-CM: 289.81   
 ACP (advance care planning)     ICD-10-CM: Z71.89 ICD-9-CM: V65.49 Swelling of right middle finger     ICD-10-CM: M79.89 ICD-9-CM: 729.81 Vitals BP Pulse Temp Resp Height(growth percentile) Weight(growth percentile) 128/71 (BP 1 Location: Left arm, BP Patient Position: Sitting) 73 98 °F (36.7 °C) (Oral) 18 5' 1\" (1.549 m) 227 lb (103 kg) LMP SpO2 BMI OB Status Smoking Status 03/02/2001 98% 42.89 kg/m2 Postmenopausal Never Smoker Vitals History BMI and BSA Data Body Mass Index Body Surface Area  
 42.89 kg/m 2 2.11 m 2 Preferred Pharmacy Pharmacy Name Phone [facility] [address] [place] [address] Rd. [phone] Your Updated Medication List  
  
   
This list is accurate as of: 2/12/18  3:42 PM.  Always use your most recent med list.  
  
  
  
  
 * albuterol 2.5 mg /3 mL (0.083 %) nebulizer solution Commonly known as:  PROVENTIL VENTOLIN  
3 mL by Nebulization route every four (4) hours as needed for Wheezing. * albuterol 90 mcg/actuation inhaler Commonly known as:  PROAIR HFA Take 2 Puffs by inhalation every six (6) hours as needed for Wheezing or Shortness of Breath. aspirin delayed-release 81 mg tablet Take 81 mg by mouth daily. furosemide 20 mg tablet Commonly known as:  LASIX Take 1-2 Tabs by mouth daily. montelukast 10 mg tablet Commonly known as:  SINGULAIR  
TAKE ONE TABLET BY MOUTH DAILY FOR ALLERGIES AND ASTHMA pneumococcal 13 janell conj dip 0.5 mL Syrg injection Commonly known as:  PREVNAR-13  
0.5 mL by IntraMUSCular route once for 1 dose. SYMBICORT 160-4.5 mcg/actuation [facility] Generic drug:  budesonide-formoterol Take 2 Puffs by inhalation two (2) times a day. traMADol 50 mg tablet Commonly known as:  ULTRAM  
Take 1 Tab by mouth nightly as needed for Pain. Max Daily Amount: 50 mg. Indications: Pain TYLENOL 325 mg tablet Generic drug:  acetaminophen Take  by mouth every four (4) hours as needed for Pain.  
  
 varicella-zoster recombinant (PF) 50 mcg/0.5 mL Susr injection Commonly known as:  SHINGRIX (PF)  
0.5 mL by IntraMUSCular route once for 1 dose. * Notice: This list has 2 medication(s) that are the same as other medications prescribed for you. Read the directions carefully, and ask your doctor or other care provider to review them with you. Prescriptions Printed Refills  
 pneumococcal 13 janell conj dip (PREVNAR-13) 0.5 mL syrg injection 0 Si.5 mL by IntraMUSCular route once for 1 dose. Class: Print Route: IntraMUSCular  
 varicella-zoster recombinant, PF, (SHINGRIX, PF,) 50 mcg/0.5 mL susr injection 1 Si.5 mL by IntraMUSCular route once for 1 dose. Class: Print Route: IntraMUSCular We Performed the Following LIPID PANEL [89583 CPT(R)] METABOLIC PANEL, COMPREHENSIVE [38524 CPT(R)] Follow-up Instructions Return in about 6 months (around 2018) for chronic follow up. Patient Instructions Advance Directives: Care Instructions Your Care Instructions An advance directive is a legal way to state your wishes at the end of your life. It tells your family and your doctor what to do if you can no longer say what you want. There are two main types of advance directives. You can change them any time that your wishes change. · A living will tells your family and your doctor your wishes about life support and other treatment. · A durable power of  for health care lets you name a person to make treatment decisions for you when you can't speak for yourself. This person is called a health care agent. If you do not have an advance directive, decisions about your medical care may be made by a doctor or a  who doesn't know you. It may help to think of an advance directive as a gift to the people who care for you. If you have one, they won't have to make tough decisions by themselves. Follow-up care is a key part of your treatment and safety.  Be sure to make and go to all appointments, and call your doctor if you are having problems. It's also a good idea to know your test results and keep a list of the medicines you take. How can you care for yourself at home? · Discuss your wishes with your loved ones and your doctor. This way, there are no surprises. · Many states have a unique form. Or you might use a universal form that has been approved by many states. This kind of form can sometimes be completed and stored online. Your electronic copy will then be available wherever you have a connection to the Internet. In most cases, doctors will respect your wishes even if you have a form from a different state. · You don't need a  to do an advance directive. But you may want to get legal advice. · Think about these questions when you prepare an advance directive: ¨ Who do you want to make decisions about your medical care if you are not able to? Many people choose a family member or close friend. ¨ Do you know enough about life support methods that might be used? If not, talk to your doctor so you understand. ¨ What are you most afraid of that might happen? You might be afraid of having pain, losing your independence, or being kept alive by machines. ¨ Where would you prefer to die? Choices include your home, a hospital, or a nursing home. ¨ Would you like to have information about hospice care to support you and your family? ¨ Do you want to donate organs when you die? ¨ Do you want certain Hindu practices performed before you die? If so, put your wishes in the advance directive. · Read your advance directive every year, and make changes as needed. When should you call for help? Be sure to contact your doctor if you have any questions. Where can you learn more? Go to http://alina-shanice.info/. Enter R264 in the search box to learn more about \"Advance Directives: Care Instructions. \" Current as of: September 24, 2016 Content Version: 11.4 © 1398-7403 [employer]. Care instructions adapted under license by [employer] (which disclaims liability or warranty for this information). If you have questions about a medical condition or this instruction, always ask your healthcare professional. [address] any warranty or liability for your use of this information. Introducing [facility] & HEALTH SERVICES! Dear Fozia Leonard: Thank you for requesting a [employer] account. Our records indicate that you already have an active [employer] account. You can access your account anytime at [email or web address]. [employer]/"[email or web address] Did you know that you can access your hospital and ER discharge instructions at any time in [employer]? You can also review all of your test results from your hospital stay or ER visit. Additional Information If you have questions, please visit the Frequently Asked Questions section of the [employer] website at [email or web address][email or web address] Remember, [employer] is NOT to be used for urgent needs. For medical emergencies, dial 911. Now available from your iPhone and Android! Please provide this summary of care documentation to your next provider. Your primary care clinician is listed as Monae Nicholas. If you have any questions after today's visit, please call [phone].

## 2018-02-12 NOTE — PROGRESS NOTES
HISTORY OF PRESENT ILLNESS  Yenifer Shearer is a 79 y.o. female. Blood pressure 128/71, pulse 73, temperature 98 °F (36.7 °C), temperature source Oral, resp. rate 18, height 5' 1\" (1.549 m), weight 227 lb (103 kg), last menstrual period 03/02/2001, SpO2 98 %. Body mass index is 42.89 kg/(m^2). Chief Complaint   Patient presents with    Hypertension     6 month follow up      HPI  Yenifer Shearer 79 y.o. female  presents to the office today for 6 month follow up. She states that she is doing well and she has no complaints. Health maintenance: She is exercising regularly, goes to the gym 4-5 times per week; she will do weight machines and tredmill. She has lost about 10 pounds since her last visit 6 months ago. She states that she plans on continuing her current regimen. She notes that she is using her inhalers only as needed and states that she has not had to use them as frequently since she started exercising. Discussed with pt that there is a new shingles vaccine called Shingrix. I will print the script for her and advised her to consult with her pharmacist to determine the cost. Also discussed with pt that she is due for the pneumococcal vaccine and will send it to her pharmacy. Pt notes that she has had swelling in her right middle finger. Advised pt to apply warm compresses to the area regularly and apply Bacitracin BID. Recent eye exam was normal, instructed to follow-up yearly. Patient given [facility] Directive form and booklet. Patient advised to follow up with me or contact nurse navigator to submit these form to medical chart. I advised Patient of the benefits of Advance Care Plan with regard to end of life care and benefits of filling forms out in case Patient cannot speak for themselves. Current Outpatient Prescriptions   Medication Sig Dispense Refill    pneumococcal 13 janell conj dip (PREVNAR-13) 0.5 mL syrg injection 0.5 mL by IntraMUSCular route once for 1 dose.  0.5 mL 0  varicella-zoster recombinant, PF, (SHINGRIX, PF,) 50 mcg/0.5 mL susr injection 0.5 mL by IntraMUSCular route once for 1 dose. 0.5 mL 1    albuterol (PROAIR HFA) 90 mcg/actuation inhaler Take 2 Puffs by inhalation every six (6) hours as needed for Wheezing or Shortness of Breath. 1 Inhaler 5    furosemide (LASIX) 20 mg tablet Take 1-2 Tabs by mouth daily. 60 Tab 0    montelukast (SINGULAIR) 10 mg tablet TAKE ONE TABLET BY MOUTH DAILY FOR ALLERGIES AND ASTHMA 90 Tab 1    traMADol (ULTRAM) 50 mg tablet Take 1 Tab by mouth nightly as needed for Pain. Max Daily Amount: 50 mg. Indications: Pain 15 Tab 0    budesonide-formoterol (SYMBICORT) 160-4.5 mcg/actuation HFA inhaler Take 2 Puffs by inhalation two (2) times a day.  albuterol (PROVENTIL VENTOLIN) 2.5 mg /3 mL (0.083 %) nebulizer solution 3 mL by Nebulization route every four (4) hours as needed for Wheezing. 72 Each 1    acetaminophen (TYLENOL) 325 mg tablet Take  by mouth every four (4) hours as needed for Pain.  aspirin delayed-release 81 mg tablet Take 81 mg by mouth daily. Allergies   Allergen Reactions    Sulfasalazine Hives    Bactrim [Sulfamethoprim Ds] Hives    Clindamycin Hives     Past Medical History:   Diagnosis Date    AR (allergic rhinitis) 2010    Chronic Venous Stasis BLE 10/14/2010    DJD (degenerative joint disease) of knee 2010    Factor V Leiden mutation ([facility] [address] 2010    HTN (hypertension) 2010    Plantar fasciitis 2010    Postmenopausal 2010    Stasis, venous 2010    Stroke ([facility] [address]     [place] 2010    Venous stasis dermatitis 10/14/2010     Past Surgical History:   Procedure Laterality Date    HX BACK SURGERY      lumbar disc    HX  SECTION       Family History   Problem Relation Age of Onset   [facility] Asthma Sister     Asthma Brother     MS Daughter     Allergic Rhinitis Son     Thyroid Disease Daughter      Social History   Substance Use Topics    Smoking status: Never Smoker    Smokeless tobacco: Never Used    Alcohol use No        Review of Systems   Constitutional: Negative. Negative for malaise/fatigue. Eyes: Negative for blurred vision. Respiratory: Negative for shortness of breath and wheezing. Cardiovascular: Positive for leg swelling. Negative for chest pain and claudication. Gastrointestinal: Negative for abdominal pain, blood in stool, constipation, nausea and vomiting. Genitourinary: Negative for dysuria and hematuria. Musculoskeletal: Positive for joint pain. Neurological: Negative. Negative for dizziness and headaches. Psychiatric/Behavioral: Negative for depression. All other systems reviewed and are negative. Physical Exam   Constitutional: She is oriented to person, place, and time. She appears well-developed and well-nourished. No distress. HENT:   Head: Normocephalic and atraumatic. Neck: Carotid bruit is not present. No thyromegaly present. Cardiovascular: Normal rate, regular rhythm, normal heart sounds and intact distal pulses. Exam reveals no gallop and no friction rub. No murmur heard. Bilateral lower extremity edema, +3/4, non pitting; No redness, warmth or tenderness. Pulmonary/Chest: Effort normal and breath sounds normal. No respiratory distress. She has no wheezes. She has no rales. Musculoskeletal: She exhibits no edema. Hands:  Lymphadenopathy:     She has no cervical adenopathy. Neurological: She is alert and oriented to person, place, and time. Skin: She is not diaphoretic. Psychiatric: She has a normal mood and affect. Her behavior is normal. Judgment and thought content normal.   Nursing note and vitals reviewed. ASSESSMENT and PLAN  Diagnoses and all orders for this visit:    1.  Mild intermittent asthma without complication        - She notes that she is using her inhalers only as needed and states that she has not had to use them as frequently since she started exercising. 2. Essential hypertension  -     METABOLIC PANEL, COMPREHENSIVE  -     LIPID PANEL  - Presumed stable, will assess levels today. 3. Obesity, Class III, BMI 40-49.9 (morbid obesity) ([facility] [place] [address]  Assessment & Plan:  Improving, based on history, physical exam and review of pertinent labs, studies and medications; meds reconciled; continue current treatment plan, lifestyle modifications recommended. Key Obesity Meds             furosemide (LASIX) 20 mg tablet  (Taking) Take 1-2 Tabs by mouth daily. Lab Results   Component Value Date/Time    Glucose 93 08/16/2017 08:01 AM    Cholesterol, total 179 08/16/2017 08:01 AM    HDL Cholesterol 49 08/16/2017 08:01 AM    LDL, calculated 115 08/16/2017 08:01 AM    Triglyceride 74 08/16/2017 08:01 AM    TSH 3.520 08/16/2017 08:01 AM    Sodium 143 08/16/2017 08:01 AM    Potassium 4.6 08/16/2017 08:01 AM    ALT (SGPT) 18 08/16/2017 08:01 AM    AST (SGOT) 18 08/16/2017 08:01 AM    VITAMIN D, 25-HYDROXY 35.6 08/16/2017 08:01 AM             4. Encounter for immunization  -     pneumococcal 13 janell conj dip (PREVNAR-13) 0.5 mL syrg injection; 0.5 mL by IntraMUSCular route once for 1 dose.  -     varicella-zoster recombinant, PF, (SHINGRIX, PF,) 50 mcg/0.5 mL susr injection; 0.5 mL by IntraMUSCular route once for 1 dose. 5. Factor V Leiden mutation [facility])  Assessment & Plan:  Stable, based on history, physical exam and review of pertinent labs, studies and medications; meds reconciled; continue current treatment plan. Lab Results   Component Value Date/Time    WBC 6.9 08/16/2017 08:01 AM    HGB 14.8 08/16/2017 08:01 AM    HCT 46.2 08/16/2017 08:01 AM    PLATELET 421 37/95/9727 08:01 AM    Creatinine 0.58 08/16/2017 08:01 AM    BUN 21 08/16/2017 08:01 AM    Potassium 4.6 08/16/2017 08:01 AM         6. ACP (advance care planning)         - Patient given [address] Directive form and booklet.  Patient advised to follow up with me or contact nurse navigator to submit these form to medical chart. I advised Patient of the benefits of Advance Care Plan with regard to end of life care and benefits of filling forms out in case Patient cannot speak for themselves. 7. Swelling of right middle finger          - Advised pt to apply warm compresses to the area regularly and apply Bacitracin BID. Follow-up Disposition:  Return in about 6 months (around 8/12/2018) for chronic follow up. Medication risks/benefits/costs/interactions/alternatives discussed with patient. Advised patient to call back or return to office if symptoms worsen/change/persist.  If patient cannot reach us or should anything more severe/urgent arise he/she should proceed directly to the nearest emergency department. Discussed expected course/resolution/complications of diagnosis in detail with patient. Patient given a written after visit summary which includes her diagnoses, current medications and vitals. Patient expressed understanding with the diagnosis and plan. Written by Nidia Crigler, scribe, as dictated by Polina Bear M.D.   I have reviewed and agree with the above note and have made corrections where appropriate, Dr. Scott Fernandez MD

## 2018-02-12 NOTE — ASSESSMENT & PLAN NOTE
Improving, based on history, physical exam and review of pertinent labs, studies and medications; meds reconciled; continue current treatment plan, lifestyle modifications recommended. Key Obesity Meds             furosemide (LASIX) 20 mg tablet  (Taking) Take 1-2 Tabs by mouth daily.         Lab Results   Component Value Date/Time    Glucose 93 08/16/2017 08:01 AM    Cholesterol, total 179 08/16/2017 08:01 AM    HDL Cholesterol 49 08/16/2017 08:01 AM    LDL, calculated 115 08/16/2017 08:01 AM    Triglyceride 74 08/16/2017 08:01 AM    TSH 3.520 08/16/2017 08:01 AM    Sodium 143 08/16/2017 08:01 AM    Potassium 4.6 08/16/2017 08:01 AM    ALT (SGPT) 18 08/16/2017 08:01 AM    AST (SGOT) 18 08/16/2017 08:01 AM    VITAMIN D, 25-HYDROXY 35.6 08/16/2017 08:01 AM

## 2018-02-12 NOTE — LETTER
2/28/2018 10:35 AM 
 
Ms. Baylee Morales [phone] Dear Baylee Rodriges: 
 
Please find your most recent results below. Resulted Orders METABOLIC PANEL, COMPREHENSIVE Result Value Ref Range Glucose 101 (H) 65 - 99 mg/dL BUN 30 (H) 8 - 27 mg/dL Creatinine 0.73 0.57 - 1.00 mg/dL GFR est non-AA 85 >59 mL/min/1.73 GFR est AA 99 >59 mL/min/1.73  
 BUN/Creatinine ratio 41 (H) 12 - 28 Sodium 142 134 - 144 mmol/L Potassium 5.1 3.5 - 5.2 mmol/L Chloride 100 96 - 106 mmol/L  
 CO2 26 18 - 29 mmol/L Calcium 9.9 8.7 - 10.3 mg/dL Protein, total 7.3 6.0 - 8.5 g/dL Albumin 4.7 3.6 - 4.8 g/dL GLOBULIN, TOTAL 2.6 1.5 - 4.5 g/dL A-G Ratio 1.8 1.2 - 2.2 Bilirubin, total 0.2 0.0 - 1.2 mg/dL Alk. phosphatase 101 39 - 117 IU/L  
 AST (SGOT) 21 0 - 40 IU/L  
 ALT (SGPT) 17 0 - 32 IU/L Narrative Performed at:  [address]  [id number] : Eric Barnett MD, Phone:  [phone] LIPID PANEL Result Value Ref Range Cholesterol, total 204 (H) 100 - 199 mg/dL Triglyceride 95 0 - 149 mg/dL HDL Cholesterol 58 >39 mg/dL VLDL, calculated 19 5 - 40 mg/dL LDL, calculated 127 (H) 0 - 99 mg/dL Narrative Performed at:  [address]  [id number] : Eric Barnett MD, Phone:  [phone] CVD REPORT Result Value Ref Range INTERPRETATION Note Comment:  
   Supplemental report is available. Narrative Performed at:  [address] [place] [address]  [id number] : Torrey Sam PhD, Phone:  [phone] Cholesterol slightly elevated Continue to work on diet and exercise. BUN is slightly elevated. Needs t be well hydrated Please call me if you have any questions: [phone] Sincerely, Reddy Dorado MD

## 2018-02-12 NOTE — PROGRESS NOTES
Chief Complaint   Patient presents with    Hypertension     6 month follow up     1. Have you been to the ER, urgent care clinic since your last visit? Hospitalized since your last visit? No    2. Have you seen or consulted any other health care providers outside of the [address] [place] [address] since your last visit? Include any pap smears or colon screening.  No

## 2018-02-13 LAB
ALBUMIN SERPL-MCNC: 4.7 G/DL (ref 3.6–4.8)
ALBUMIN/GLOB SERPL: 1.8 {RATIO} (ref 1.2–2.2)
ALP SERPL-CCNC: 101 IU/L (ref 39–117)
ALT SERPL-CCNC: 17 IU/L (ref 0–32)
AST SERPL-CCNC: 21 IU/L (ref 0–40)
BILIRUB SERPL-MCNC: 0.2 MG/DL (ref 0–1.2)
BUN SERPL-MCNC: 30 MG/DL (ref 8–27)
BUN/CREAT SERPL: 41 (ref 12–28)
CALCIUM SERPL-MCNC: 9.9 MG/DL (ref 8.7–10.3)
CHLORIDE SERPL-SCNC: 100 MMOL/L (ref 96–106)
CHOLEST SERPL-MCNC: 204 MG/DL (ref 100–199)
CO2 SERPL-SCNC: 26 MMOL/L (ref 18–29)
CREAT SERPL-MCNC: 0.73 MG/DL (ref 0.57–1)
GFR SERPLBLD CREATININE-BSD FMLA CKD-EPI: 85 ML/MIN/1.73
GFR SERPLBLD CREATININE-BSD FMLA CKD-EPI: 99 ML/MIN/1.73
GLOBULIN SER CALC-MCNC: 2.6 G/DL (ref 1.5–4.5)
GLUCOSE SERPL-MCNC: 101 MG/DL (ref 65–99)
HDLC SERPL-MCNC: 58 MG/DL
INTERPRETATION, 910389: NORMAL
LDLC SERPL CALC-MCNC: 127 MG/DL (ref 0–99)
POTASSIUM SERPL-SCNC: 5.1 MMOL/L (ref 3.5–5.2)
PROT SERPL-MCNC: 7.3 G/DL (ref 6–8.5)
SODIUM SERPL-SCNC: 142 MMOL/L (ref 134–144)
TRIGL SERPL-MCNC: 95 MG/DL (ref 0–149)
VLDLC SERPL CALC-MCNC: 19 MG/DL (ref 5–40)

## 2018-02-26 NOTE — PROGRESS NOTES
Cholesterol slightly elevated  Continue to work on diet and exercise. Pt needs t be well hydrated  BUN is slightly elevated.

## 2018-04-26 ENCOUNTER — APPOINTMENT (OUTPATIENT)
Dept: CT IMAGING | Age: 68
End: 2018-04-26
Attending: PHYSICIAN ASSISTANT
Payer: COMMERCIAL

## 2018-04-26 ENCOUNTER — HOSPITAL ENCOUNTER (EMERGENCY)
Age: 68
Discharge: HOME | End: 2018-04-26
Attending: STUDENT IN AN ORGANIZED HEALTH CARE EDUCATION/TRAINING PROGRAM
Payer: COMMERCIAL

## 2018-04-26 VITALS
WEIGHT: 232.4 LBS | RESPIRATION RATE: 12 BRPM | BODY MASS INDEX: 43.91 KG/M2 | OXYGEN SATURATION: 98 % | TEMPERATURE: 97.6 F | HEART RATE: 67 BPM | DIASTOLIC BLOOD PRESSURE: 85 MMHG | SYSTOLIC BLOOD PRESSURE: 134 MMHG

## 2018-04-26 DIAGNOSIS — S09.90XA INJURY OF HEAD, INITIAL ENCOUNTER: Primary | ICD-10-CM

## 2018-04-26 PROCEDURE — 74011250637 HC RX REV CODE- 250/637: Performed by: PHYSICIAN ASSISTANT

## 2018-04-26 PROCEDURE — 70450 CT HEAD/BRAIN W/O DYE: CPT

## 2018-04-26 PROCEDURE — 99283 EMERGENCY DEPT VISIT LOW MDM: CPT

## 2018-04-26 RX ORDER — ONDANSETRON 4 MG/1
4 TABLET, ORALLY DISINTEGRATING ORAL
Qty: 12 TAB | Refills: 0 | Status: SHIPPED | OUTPATIENT
Start: 2018-04-26 | End: 2018-05-06

## 2018-04-26 RX ORDER — HYDROCODONE BITARTRATE AND ACETAMINOPHEN 7.5; 325 MG/1; MG/1
1 TABLET ORAL
Status: COMPLETED | OUTPATIENT
Start: 2018-04-26 | End: 2018-04-26

## 2018-04-26 RX ORDER — METHOCARBAMOL 500 MG/1
500 TABLET, FILM COATED ORAL 3 TIMES DAILY
Qty: 15 TAB | Refills: 0 | Status: SHIPPED | OUTPATIENT
Start: 2018-04-26 | End: 2018-08-13 | Stop reason: ALTCHOICE

## 2018-04-26 RX ORDER — HYDROCODONE BITARTRATE AND ACETAMINOPHEN 5; 325 MG/1; MG/1
1 TABLET ORAL
Qty: 10 TAB | Refills: 0 | Status: SHIPPED | OUTPATIENT
Start: 2018-04-26 | End: 2018-08-13 | Stop reason: ALTCHOICE

## 2018-04-26 RX ADMIN — HYDROCODONE BITARTRATE AND ACETAMINOPHEN 1 TABLET: 7.5; 325 TABLET ORAL at 20:51

## 2018-04-26 NOTE — ED TRIAGE NOTES
TRIAGE NOTE:  Patient arrives with c/o fell down stairs  and hit back of head. Patient states \"I felt really weird\".   Patient reports \"like a tingling in my teeth\"

## 2018-04-27 NOTE — DISCHARGE INSTRUCTIONS
Learning About a Closed Head Injury  What is a closed head injury? A closed head injury happens when your head gets hit hard. The strong force of the blow causes your brain to shake in your skull. This movement can cause the brain to bruise, swell, or tear. Sometimes nerves or blood vessels also get damaged. This can cause bleeding in or around the brain. A concussion is a type of closed head injury. What are the symptoms? If you have a mild concussion, you may have a mild headache or feel \"not quite right. \" These symptoms are common. They usually go away over a few days to 4 weeks. But sometimes after a concussion, you feel like you can't function as well as before the injury. And you have new symptoms. This is called postconcussive syndrome. You may:  · Find it harder to solve problems, think, concentrate, or remember. · Have headaches. · Have changes in your sleep patterns, such as not being able to sleep or sleeping all the time. · Have changes in your personality. · Not be interested in your usual activities. · Feel angry or anxious without a clear reason. · Lose your sense of taste or smell. · Be dizzy, lightheaded, or unsteady. It may be hard to stand or walk. How is a closed head injury treated? Any person who may have a concussion needs to see a doctor. Some people have to stay in the hospital to be watched. Others can go home safely. If you go home, follow your doctor's instructions. He or she will tell you if you need someone to watch you closely for the next 24 hours or longer. Rest is the best treatment. Get plenty of sleep at night. And try to rest during the day. · Avoid activities that are physically or mentally demanding. These include housework, exercise, and schoolwork. And don't play video games, send text messages, or use the computer. You may need to change your school or work schedule to be able to avoid these activities.   · Ask your doctor when it's okay to drive, ride a bike, or operate machinery. · Take an over-the-counter pain medicine, such as acetaminophen (Tylenol), ibuprofen (Advil, Motrin), or naproxen (Aleve). Be safe with medicines. Read and follow all instructions on the label. · Check with your doctor before you use any other medicines for pain. · Do not drink alcohol or use illegal drugs. They can slow recovery. They can also increase your risk of getting a second head injury. Follow-up care is a key part of your treatment and safety. Be sure to make and go to all appointments, and call your doctor if you are having problems. It's also a good idea to know your test results and keep a list of the medicines you take. Where can you learn more? Go to http://alina-shanice.info/. Enter E235 in the search box to learn more about \"Learning About a Closed Head Injury. \"  Current as of: October 14, 2016  Content Version: 11.4  © 3665-4449 Healthwise, Incorporated. Care instructions adapted under license by [employer] (which disclaims liability or warranty for this information). If you have questions about a medical condition or this instruction, always ask your healthcare professional. [address] any warranty or liability for your use of this information.

## 2018-04-27 NOTE — ED PROVIDER NOTES
HPI Comments: 75 yo female with hx of HTN, TIA, DJD, Factor V Liden, AR, venous stasis, and stroke here for evaluation after fall. States she was on the steps with her grandson when she thought she had more space but did not; when she went to go up she fell back striking back of head on a metal stool. Pain to back of head. Does not think she had LOC. Denies pain to neck. \"Mild\" pain to L upper shoulder; full ROM. No medications taken. Denies N/V, dizziness, neck pain, CP, SOB. States she had no symptoms prior to fall just misjudged her step. No blood thinners. Patient is a 76 y.o. female presenting with head injury. The history is provided by the patient and a relative. Head Injury    The incident occurred less than 1 hour ago. She came to the ER via walk-in. The injury mechanism was a fall. The volume of blood lost was none. The pain is at a severity of 7/10. The pain is mild. The pain has been constant since the injury. Pertinent negatives include no numbness, no vomiting, no weakness and no memory loss. She has been behaving normally. Past Medical History:   Diagnosis Date    AR (allergic rhinitis) 2010    Chronic Venous Stasis BLE 10/14/2010    DJD (degenerative joint disease) of knee 2010    Factor V Leiden mutation ([facility] [address] 2010    HTN (hypertension) 2010    Plantar fasciitis 2010    Postmenopausal 2010    Stasis, venous 2010    Stroke ([facility] [address]     T. I.A. 2010    Venous stasis dermatitis 10/14/2010       Past Surgical History:   Procedure Laterality Date    HX BACK SURGERY      lumbar disc    HX  SECTION           Family History:   Problem Relation Age of Onset   [facility] Asthma Sister     Asthma Brother     MS Daughter     Allergic Rhinitis Son     Thyroid Disease Daughter        Social History     Social History    Marital status:      Spouse name: N/A    Number of children: 4    Years of education: N/A Occupational History    Not on file. Social History Main Topics    Smoking status: Never Smoker    Smokeless tobacco: Never Used    Alcohol use No    Drug use: No    Sexual activity: Yes     Partners: Male     Birth control/ protection: None     Other Topics Concern    Caffeine Concern Yes     rare    Weight Concern Yes     cant seem to lose; fluctuates btw 240-250    Special Diet No    Exercise Yes     walks treadmill or outside 3 x a week; 1-2 miles     Social History Narrative         ALLERGIES: Sulfasalazine; Bactrim [sulfamethoprim ds]; and Clindamycin    Review of Systems   Constitutional: Negative. HENT: Negative for ear discharge. Eyes: Negative for photophobia, pain, discharge and visual disturbance. Respiratory: Negative for apnea, cough, chest tightness and shortness of breath. Cardiovascular: Negative for chest pain, palpitations and leg swelling. Gastrointestinal: Negative for abdominal distention, abdominal pain, blood in stool and vomiting. Genitourinary: Negative for difficulty urinating, dysuria, flank pain, frequency and hematuria. Musculoskeletal: Positive for myalgias. Negative for back pain, gait problem, joint swelling and neck pain. Skin: Negative for color change and pallor. Neurological: Positive for headaches. Negative for dizziness, syncope, weakness and numbness. Psychiatric/Behavioral: Negative for behavioral problems, confusion and memory loss. The patient is not nervous/anxious. Vitals:    04/26/18 1958   BP: (!) 179/98   Pulse: 80   Resp: 18   Temp: 97.6 °F (36.4 °C)   SpO2: 100%   Weight: 105.4 kg (232 lb 6.4 oz)            Physical Exam   Constitutional: She is oriented to person, place, and time. She appears well-developed and well-nourished. No distress. HENT:   Head: Normocephalic.        Right Ear: External ear normal.   Left Ear: External ear normal.   Nose: Nose normal.   Mouth/Throat: Oropharynx is clear and moist.   Eyes: Conjunctivae and EOM are normal. Pupils are equal, round, and reactive to light. Right eye exhibits no discharge. Left eye exhibits no discharge. Neck: Normal range of motion. Neck supple. Cardiovascular: Normal rate, regular rhythm, normal heart sounds and intact distal pulses. Pulmonary/Chest: Effort normal and breath sounds normal.   Abdominal: Soft. Bowel sounds are normal. She exhibits no distension. There is no tenderness. There is no rebound and no guarding. Musculoskeletal: Normal range of motion. She exhibits no edema. Cervical back: Normal.        Thoracic back: Normal.        Arms:  Neurological: She is alert and oriented to person, place, and time. She has normal strength. She is not disoriented. No cranial nerve deficit or sensory deficit. Coordination and gait normal. GCS eye subscore is 4. GCS verbal subscore is 5. GCS motor subscore is 6. Skin: Skin is warm and dry. No rash noted. Psychiatric: She has a normal mood and affect. Her behavior is normal. Judgment and thought content normal.   Nursing note and vitals reviewed. MDM  Number of Diagnoses or Management Options  Injury of head, initial encounter:      Amount and/or Complexity of Data Reviewed  Tests in the radiology section of CPT®: ordered and reviewed  Obtain history from someone other than the patient: yes  Discuss the patient with other providers: yes  Independent visualization of images, tracings, or specimens: yes          ED Course       Procedures    Will get CT of head; NO neck pain; declines xray L shoulder as she has full ROM. DONN Hernandez     Patient has been reassessed. Feeling better. Reviewed medications and radiographics with patient. Ready to discharge home. Discussed case with attending Physician. Agrees with care and will D/C with follow up. Patient's results have been reviewed with them.   Patient and/or family have verbally conveyed their understanding and agreement of the patient's signs, symptoms, diagnosis, treatment and prognosis and additionally agree to follow up as recommended or return to the Emergency Room should their condition change prior to follow-up. Discharge instructions have also been provided to the patient with some educational information regarding their diagnosis as well a list of reasons why they would want to return to the ER prior to their follow-up appointment should their condition change.   DONN Hannon

## 2018-08-13 ENCOUNTER — OFFICE VISIT (OUTPATIENT)
Dept: FAMILY MEDICINE CLINIC | Age: 68
End: 2018-08-13

## 2018-08-13 VITALS
DIASTOLIC BLOOD PRESSURE: 73 MMHG | OXYGEN SATURATION: 96 % | HEART RATE: 75 BPM | RESPIRATION RATE: 16 BRPM | SYSTOLIC BLOOD PRESSURE: 138 MMHG | WEIGHT: 229 LBS | TEMPERATURE: 98.7 F | HEIGHT: 61 IN | BODY MASS INDEX: 43.23 KG/M2

## 2018-08-13 DIAGNOSIS — J45.909 BRONCHITIS WITH ASTHMA, SUBACUTE: ICD-10-CM

## 2018-08-13 DIAGNOSIS — J20.9 BRONCHITIS WITH ASTHMA, SUBACUTE: ICD-10-CM

## 2018-08-13 DIAGNOSIS — E66.01 OBESITY, CLASS III, BMI 40-49.9 (MORBID OBESITY) (HCC): ICD-10-CM

## 2018-08-13 DIAGNOSIS — J45.20 MILD INTERMITTENT ASTHMA WITHOUT COMPLICATION: ICD-10-CM

## 2018-08-13 DIAGNOSIS — I10 ESSENTIAL HYPERTENSION: Primary | ICD-10-CM

## 2018-08-13 DIAGNOSIS — Z23 ENCOUNTER FOR IMMUNIZATION: ICD-10-CM

## 2018-08-13 RX ORDER — ALBUTEROL SULFATE 90 UG/1
2 AEROSOL, METERED RESPIRATORY (INHALATION)
Qty: 1 INHALER | Refills: 5 | Status: SHIPPED | OUTPATIENT
Start: 2018-08-13 | End: 2021-08-03

## 2018-08-13 NOTE — PROGRESS NOTES
HISTORY OF PRESENT ILLNESS  Rory Sultana is a 76 y.o. female. Blood pressure 138/73, pulse 75, temperature 98.7 °F (37.1 °C), temperature source Oral, resp. rate 16, height 5' 1\" (1.549 m), weight 229 lb (103.9 kg), last menstrual period 03/02/2001, SpO2 96 %. Body mass index is 43.27 kg/(m^2). Chief Complaint   Patient presents with    Follow Up Chronic Condition     6 month follow up    Cough     worse at night- x 10 days        HPI  Rory Sultana 76 y.o. female  presents to the office today for follow up on cough. Hypertension: Bp at office today 138/73. Pt continues with diet and exercise. Advised pt to continue to work on diet and exercise. Asthma: Pt denies having asthma symptoms, and notes that she needs a refill of her medications. She continues with albuterol 90mcg/actuation, 2 puffs daily, and has discontinued all other medications. Pt states that she believes she may have had a cold before today's visit. Pt reports having wheezing and chest tightness for 10 days, but symptoms have subsided. She denies any SOB. I advised pt that I will refill her albuterol. Stable, continue current regimen. Hyperlipidemia: Lipid panel on 2/12/2018 notable for total cholesterol 204, , HDL 58, and triglycerides 95. Pt continues with diet and exercise. Presumed stable, will assess levels later this week. Health Maintenance: Pt reports that she is walking for her exercise. Advised pt to continue to work on diet and exercise. Recommended that pt also receive Prevnar 13 vaccination and notify me when complete. Current Outpatient Prescriptions   Medication Sig Dispense Refill    albuterol (PROAIR HFA) 90 mcg/actuation inhaler Take 2 Puffs by inhalation every six (6) hours as needed for Wheezing or Shortness of Breath. 1 Inhaler 5    pneumococcal 13 janell conj dip (PREVNAR-13) 0.5 mL syrg injection 0.5 mL by IntraMUSCular route once for 1 dose.  0.5 mL 0    furosemide (LASIX) 20 mg tablet Take 1-2 Tabs by mouth daily. 60 Tab 0    montelukast (SINGULAIR) 10 mg tablet TAKE ONE TABLET BY MOUTH DAILY FOR ALLERGIES AND ASTHMA 90 Tab 1    albuterol (PROVENTIL VENTOLIN) 2.5 mg /3 mL (0.083 %) nebulizer solution 3 mL by Nebulization route every four (4) hours as needed for Wheezing. 72 Each 1    acetaminophen (TYLENOL) 325 mg tablet Take  by mouth every four (4) hours as needed for Pain.  aspirin delayed-release 81 mg tablet Take 81 mg by mouth daily. Allergies   Allergen Reactions    Sulfasalazine Hives    Bactrim [Sulfamethoprim Ds] Hives    Clindamycin Hives     Past Medical History:   Diagnosis Date    AR (allergic rhinitis) 2010    Chronic Venous Stasis BLE 10/14/2010    DJD (degenerative joint disease) of knee 2010    Factor V Leiden mutation ([facility] [address] 2010    HTN (hypertension) 2010    Plantar fasciitis 2010    Postmenopausal 2010    Stasis, venous 2010    Stroke ([facility] [address]     T. I.A. 2010    Venous stasis dermatitis 10/14/2010     Past Surgical History:   Procedure Laterality Date    HX BACK SURGERY      lumbar disc    HX  SECTION       Family History   Problem Relation Age of Onset   [address] [facility] Asthma Sister     Asthma Brother     MS Daughter     Allergic Rhinitis Son     Thyroid Disease Daughter      Social History   Substance Use Topics    Smoking status: Never Smoker    Smokeless tobacco: Never Used    Alcohol use No        Review of Systems   Constitutional: Negative. Negative for malaise/fatigue. Eyes: Negative for blurred vision. Respiratory: Negative for shortness of breath. Cardiovascular: Negative for chest pain and leg swelling. Musculoskeletal: Negative. Neurological: Negative. Negative for dizziness and headaches. All other systems reviewed and are negative. Physical Exam   Constitutional: She is oriented to person, place, and time. She appears well-developed and well-nourished. No distress. HENT:   Head: Normocephalic and atraumatic. Neck: Carotid bruit is not present. Cardiovascular: Normal rate, regular rhythm, normal heart sounds and intact distal pulses. Exam reveals no gallop and no friction rub. No murmur heard. Pulmonary/Chest: Effort normal and breath sounds normal. No respiratory distress. She has no wheezes. She has no rales. Musculoskeletal: She exhibits no edema. Neurological: She is alert and oriented to person, place, and time. Skin: She is not diaphoretic. Psychiatric: She has a normal mood and affect. Her behavior is normal. Judgment and thought content normal.   Nursing note and vitals reviewed. ASSESSMENT and PLAN  Diagnoses and all orders for this visit:    1. Essential hypertension  -     METABOLIC PANEL, COMPREHENSIVE  -     LIPID PANEL  -     CBC WITH AUTOMATED DIFF  - Bp at office today 138/73. Pt continues with diet and exercise. Advised pt to continue to work on diet and exercise. 2. Bronchitis with asthma, subacute  -     albuterol (PROAIR HFA) 90 mcg/actuation inhaler; Take 2 Puffs by inhalation every six (6) hours as needed for Wheezing or Shortness of Breath. - I advised pt that I will refill her albuterol. Stable, continue current regimen. 3. Obesity, Class III, BMI 40-49.9 (morbid obesity) ([facility] [address]        - I have reviewed/discussed the above normal BMI with the patient. I have recommended the following interventions: dietary management education, guidance, and counseling, encourage exercise and monitor weight . .      4. Mild intermittent asthma without complication        - See Above    5. Encounter for immunization  -     pneumococcal 13 janell conj dip (PREVNAR-13) 0.5 mL syrg injection; 0.5 mL by IntraMUSCular route once for 1 dose. - Recommended that pt also receive Prevnar 13 vaccination and notify me when complete. Follow-up Disposition:  Return in about 6 months (around 2/13/2019) for chronic follow up.    Medication risks/benefits/costs/interactions/alternatives discussed with patient. Advised patient to call back or return to office if symptoms worsen/change/persist.  If patient cannot reach us or should anything more severe/urgent arise he/she should proceed directly to the nearest emergency department. Discussed expected course/resolution/complications of diagnosis in detail with patient. Patient given a written after visit summary which includes her diagnoses, current medications and vitals. Patient expressed understanding with the diagnosis and plan. Written by elvira Bach, as dictated by Sydney Chapa M.D.  3:25 PM - 3:36 PM  Total time spent with the patient 11 minutes, greater than 50% of time spent counseling patient.      I have reviewed and agree with the above note and have made corrections where appropriate, Dr. Emely Coto MD

## 2018-08-13 NOTE — PROGRESS NOTES
Chief Complaint   Patient presents with    Follow Up Chronic Condition     6 month follow up    Cough     worse at night- x 10 days     1. Have you been to the ER, urgent care clinic since your last visit? Hospitalized since your last visit? Yes- [facility]- 4/26/18- head injury  (fall)    2. Have you seen or consulted any other health care providers outside of the [facility] [place] [address] [place] since your last visit? Include any pap smears or colon screening.  No

## 2018-08-13 NOTE — MR AVS SNAPSHOT
[address] [place] [address] [address]ely [address] 
[phone] Patient: Kiet Mckeon MRN: [id number] ADB:[birth year] Visit Information Date & Time Provider Department Dept. Phone Encounter #  
 8/13/2018  2:30 PM Domenica Gillis  [facility] [phone] [id number] Follow-up Instructions Return in about 6 months (around 2/13/2019) for chronic follow up. Upcoming Health Maintenance Date Due ZOSTER VACCINE AGE 60> 2/8/2010 Pneumococcal 65+ Low/Medium Risk (1 of 2 - PCV13) 8/14/2018* Influenza Age 5 to Adult 9/30/2018* BREAST CANCER SCRN MAMMOGRAM 3/21/2019 GLAUCOMA SCREENING Q2Y 9/13/2019 COLONOSCOPY 7/8/2021 DTaP/Tdap/Td series (2 - Td) 8/14/2027 *Topic was postponed. The date shown is not the original due date. Allergies as of 8/13/2018  Review Complete On: 8/13/2018 By: Domenica Gillis MD  
  
 Severity Noted Reaction Type Reactions Sulfasalazine Medium 06/16/2017    Hives Bactrim [Sulfamethoprim Ds]  03/30/2012    Hives Clindamycin  03/30/2012   Side Effect Hives Current Immunizations  Reviewed on 8/14/2017 Name Date Influenza Vaccine 11/5/2016, 12/3/2014 Td 2/10/2004 Tdap 8/14/2017 Not reviewed this visit You Were Diagnosed With   
  
 Codes Comments Essential hypertension    -  Primary ICD-10-CM: I10 
ICD-9-CM: 401.9 Bronchitis with asthma, subacute     ICD-10-CM: J20.9, J45.909 ICD-9-CM: 466.0, 493.90 Obesity, Class III, BMI 40-49.9 (morbid obesity) (HCC)     ICD-10-CM: E66.01 
ICD-9-CM: 278.01 Mild intermittent asthma without complication     TVB-25-JE: J45.20 ICD-9-CM: 493.90 Vitals BP Pulse Temp Resp Height(growth percentile) Weight(growth percentile) 138/73 (BP 1 Location: Right arm, BP Patient Position: Sitting) 75 98.7 °F (37.1 °C) (Oral) 16 5' 1\" (1.549 m) 229 lb (103.9 kg) LMP SpO2 BMI OB Status Smoking Status 03/02/2001 96% 43.27 kg/m2 Postmenopausal Never Smoker Vitals History BMI and BSA Data Body Mass Index Body Surface Area  
 43.27 kg/m 2 2.11 m 2 Preferred Pharmacy Pharmacy Name Phone [address] [place] [address], [address] [place] [address] Rd. [phone] Your Updated Medication List  
  
   
This list is accurate as of 8/13/18  3:34 PM.  Always use your most recent med list.  
  
  
  
  
 * albuterol 2.5 mg /3 mL (0.083 %) nebulizer solution Commonly known as:  PROVENTIL VENTOLIN  
3 mL by Nebulization route every four (4) hours as needed for Wheezing. * albuterol 90 mcg/actuation inhaler Commonly known as:  PROAIR HFA Take 2 Puffs by inhalation every six (6) hours as needed for Wheezing or Shortness of Breath. aspirin delayed-release 81 mg tablet Take 81 mg by mouth daily. furosemide 20 mg tablet Commonly known as:  LASIX Take 1-2 Tabs by mouth daily. montelukast 10 mg tablet Commonly known as:  SINGULAIR  
TAKE ONE TABLET BY MOUTH DAILY FOR ALLERGIES AND ASTHMA TYLENOL 325 mg tablet Generic drug:  acetaminophen Take  by mouth every four (4) hours as needed for Pain. * Notice: This list has 2 medication(s) that are the same as other medications prescribed for you. Read the directions carefully, and ask your doctor or other care provider to review them with you. Prescriptions Sent to Pharmacy Refills  
 albuterol (PROAIR HFA) 90 mcg/actuation inhaler 5 Sig: Take 2 Puffs by inhalation every six (6) hours as needed for Wheezing or Shortness of Breath. Class: Normal  
 Pharmacy: [facility] [address] [facility] Ph #: [phone] Route: Inhalation We Performed the Following CBC WITH AUTOMATED DIFF [37285 CPT(R)] LIPID PANEL [45244 CPT(R)] METABOLIC PANEL, COMPREHENSIVE [35606 CPT(R)] Follow-up Instructions Return in about 6 months (around 2/13/2019) for chronic follow up. Introducing [facility]! Dear Danial Gil: Thank you for requesting a [employer] account. Our records indicate that you already have an active [employer] account. You can access your account anytime at [email or web address] Did you know that you can access your hospital and ER discharge instructions at any time in [employer]? You can also review all of your test results from your hospital stay or ER visit. Additional Information If you have questions, please visit the Frequently Asked Questions section of the [employer] website at [email or web address] Remember, [employer] is NOT to be used for urgent needs. For medical emergencies, dial 911. Now available from your iPhone and Android! Please provide this summary of care documentation to your next provider. Your primary care clinician is listed as [facility]. If you have any questions after today's visit, please call [phone].

## 2018-08-18 PROBLEM — H43.812 PVD (POSTERIOR VITREOUS DETACHMENT), LEFT EYE: Status: ACTIVE | Noted: 2017-09-18

## 2018-08-18 PROBLEM — H25.012 CORTICAL AGE-RELATED CATARACT OF LEFT EYE: Status: ACTIVE | Noted: 2017-09-18

## 2018-08-18 PROBLEM — H25.011 CORTICAL AGE-RELATED CATARACT OF RIGHT EYE: Status: ACTIVE | Noted: 2017-09-18

## 2018-08-18 NOTE — PATIENT INSTRUCTIONS
Learning About ARBs  Introduction    ARBs (angiotensin II receptor blockers) block a hormone that makes blood vessels narrow. As a result, the blood vessels relax and widen. This lowers blood pressure. ARBs also put more water and salt into the urine. This also lowers blood pressure. ARBs can treat:  · High blood pressure. · Coronary artery disease. · Heart failure. They also may be used to help your kidneys when you have diabetes. Examples  ARBs include:  · Candesartan (Atacand). · Irbesartan (Avapro). · Losartan (Cozaar). This is not a complete list of all ARBs. Possible side effects  Side effects may include:  · Low blood pressure. You may feel dizzy and weak. · Diarrhea. · High potassium levels. You may have other side effects or reactions not listed here. Check the information that comes with your medicine. What to know about taking this medicine  · ARBs may be used if you had a cough when you tried to take an ACE inhibitor. ARBs are less likely to cause a cough. · You may need regular blood tests. · Take your medicines exactly as prescribed. Call your doctor if you think you are having a problem with your medicine. · Tell your doctor or pharmacist all the medicines you take. This includes over-the-counter medicines, vitamins, herbal products, and supplements. Taking some medicines together can cause problems. · You should not take ARBs if you are pregnant or planning to become pregnant. Where can you learn more? Go to http://alina-shanice.info/. Enter K212 in the search box to learn more about \"Learning About ARBs. \"  Current as of: December 6, 2017  Content Version: 11.7  © 6082-1783 [employer]. Care instructions adapted under license by [employer] (which disclaims liability or warranty for this information).  If you have questions about a medical condition or this instruction, always ask your healthcare professional. Rylan Gross disclaims any warranty or liability for your use of this information.

## 2018-08-22 LAB
ALBUMIN SERPL-MCNC: 4.6 G/DL (ref 3.6–4.8)
ALBUMIN/GLOB SERPL: 1.9 {RATIO} (ref 1.2–2.2)
ALP SERPL-CCNC: 88 IU/L (ref 39–117)
ALT SERPL-CCNC: 21 IU/L (ref 0–32)
AST SERPL-CCNC: 23 IU/L (ref 0–40)
BASOPHILS # BLD AUTO: 0 X10E3/UL (ref 0–0.2)
BASOPHILS NFR BLD AUTO: 0 %
BILIRUB SERPL-MCNC: 0.3 MG/DL (ref 0–1.2)
BUN SERPL-MCNC: 23 MG/DL (ref 8–27)
BUN/CREAT SERPL: 37 (ref 12–28)
CALCIUM SERPL-MCNC: 9.3 MG/DL (ref 8.7–10.3)
CHLORIDE SERPL-SCNC: 102 MMOL/L (ref 96–106)
CHOLEST SERPL-MCNC: 170 MG/DL (ref 100–199)
CO2 SERPL-SCNC: 23 MMOL/L (ref 20–29)
CREAT SERPL-MCNC: 0.63 MG/DL (ref 0.57–1)
EOSINOPHIL # BLD AUTO: 0.3 X10E3/UL (ref 0–0.4)
EOSINOPHIL NFR BLD AUTO: 5 %
ERYTHROCYTE [DISTWIDTH] IN BLOOD BY AUTOMATED COUNT: 14.3 % (ref 12.3–15.4)
GLOBULIN SER CALC-MCNC: 2.4 G/DL (ref 1.5–4.5)
GLUCOSE SERPL-MCNC: 94 MG/DL (ref 65–99)
HCT VFR BLD AUTO: 43.1 % (ref 34–46.6)
HDLC SERPL-MCNC: 57 MG/DL
HGB BLD-MCNC: 14.3 G/DL (ref 11.1–15.9)
IMM GRANULOCYTES # BLD: 0 X10E3/UL (ref 0–0.1)
IMM GRANULOCYTES NFR BLD: 0 %
INTERPRETATION, 910389: NORMAL
LDLC SERPL CALC-MCNC: 100 MG/DL (ref 0–99)
LYMPHOCYTES # BLD AUTO: 1.9 X10E3/UL (ref 0.7–3.1)
LYMPHOCYTES NFR BLD AUTO: 31 %
MCH RBC QN AUTO: 30.3 PG (ref 26.6–33)
MCHC RBC AUTO-ENTMCNC: 33.2 G/DL (ref 31.5–35.7)
MCV RBC AUTO: 91 FL (ref 79–97)
MONOCYTES # BLD AUTO: 0.6 X10E3/UL (ref 0.1–0.9)
MONOCYTES NFR BLD AUTO: 10 %
NEUTROPHILS # BLD AUTO: 3.2 X10E3/UL (ref 1.4–7)
NEUTROPHILS NFR BLD AUTO: 54 %
PLATELET # BLD AUTO: 258 X10E3/UL (ref 150–379)
POTASSIUM SERPL-SCNC: 4.4 MMOL/L (ref 3.5–5.2)
PROT SERPL-MCNC: 7 G/DL (ref 6–8.5)
RBC # BLD AUTO: 4.72 X10E6/UL (ref 3.77–5.28)
SODIUM SERPL-SCNC: 141 MMOL/L (ref 134–144)
TRIGL SERPL-MCNC: 63 MG/DL (ref 0–149)
VLDLC SERPL CALC-MCNC: 13 MG/DL (ref 5–40)
WBC # BLD AUTO: 6 X10E3/UL (ref 3.4–10.8)

## 2018-09-03 NOTE — PROGRESS NOTES
Inform pt to go to my chart to see results and recommendations RECOMMENDATIONS: 
None. Keep up the good work! Continue with current  medications. Labs look good

## 2018-10-26 DIAGNOSIS — J45.41 MODERATE PERSISTENT ASTHMA WITH ACUTE EXACERBATION: ICD-10-CM

## 2018-10-29 RX ORDER — MONTELUKAST SODIUM 10 MG/1
TABLET ORAL
Qty: 90 TAB | Refills: 1 | Status: SHIPPED | OUTPATIENT
Start: 2018-10-29 | End: 2019-02-11 | Stop reason: SDUPTHER

## 2019-02-11 ENCOUNTER — OFFICE VISIT (OUTPATIENT)
Dept: FAMILY MEDICINE CLINIC | Age: 69
End: 2019-02-11

## 2019-02-11 VITALS
BODY MASS INDEX: 38.14 KG/M2 | SYSTOLIC BLOOD PRESSURE: 130 MMHG | HEART RATE: 74 BPM | HEIGHT: 61 IN | WEIGHT: 202 LBS | OXYGEN SATURATION: 98 % | RESPIRATION RATE: 18 BRPM | DIASTOLIC BLOOD PRESSURE: 80 MMHG | TEMPERATURE: 98 F

## 2019-02-11 DIAGNOSIS — D68.51 FACTOR V LEIDEN MUTATION (HCC): ICD-10-CM

## 2019-02-11 DIAGNOSIS — Z12.31 VISIT FOR SCREENING MAMMOGRAM: ICD-10-CM

## 2019-02-11 DIAGNOSIS — J45.41 MODERATE PERSISTENT ASTHMA WITH ACUTE EXACERBATION: ICD-10-CM

## 2019-02-11 DIAGNOSIS — E66.01 OBESITY, CLASS III, BMI 40-49.9 (MORBID OBESITY) (HCC): ICD-10-CM

## 2019-02-11 DIAGNOSIS — Z12.39 SCREENING FOR BREAST CANCER: ICD-10-CM

## 2019-02-11 DIAGNOSIS — I10 ESSENTIAL HYPERTENSION: ICD-10-CM

## 2019-02-11 DIAGNOSIS — N81.4 UTERINE PROLAPSE: Primary | ICD-10-CM

## 2019-02-11 RX ORDER — MONTELUKAST SODIUM 10 MG/1
TABLET ORAL
Qty: 30 TAB | Refills: 5 | Status: SHIPPED | OUTPATIENT
Start: 2019-02-11 | End: 2020-08-04 | Stop reason: SDUPTHER

## 2019-02-11 NOTE — PROGRESS NOTES
HISTORY OF PRESENT ILLNESS Raymond Birmingham 76 y.o. female  presents to the office today with c/o prolapsed bladder and follow up on HTN and cholesterol. HPI Blood pressure 130/80, pulse 74, temperature 98 °F (36.7 °C), temperature source Oral, resp. rate 18, height 5' 1\" (1.549 m), weight 202 lb (91.6 kg), last menstrual period 03/02/2001, SpO2 98 %. Body mass index is 38.17 kg/m². Chief Complaint Patient presents with  Hypertension  
  follow up  Cholesterol Problem  
  follow up  Other ? prolapsed bladder Hypertension: Bp at office today 142/65 and 130/80 on manual recheck today in office. Pt continues with is not on medications at this time. BP is at goal on manual recheck today in office. Advised pt to continue monitoring BP at home and continue dietary changes and exercising. Hyperlipidemia: Lipid panel on 8/21/18 notable for total cholesterol 170, , HDL 57, and triglycerides 63. Pt continues with aspirin 81 mg. Presumed stable, will assess levels today. Continue to monitor diet and exercise. Asthma: Pt reports she has not needed inhaler recently. She states she normally has flares in the spring and will take albuterol PRN. Advised pt to resume Singulair in March prior to the beginning of Spring. Uterine Prolapse: Pt reports change in urinary frequency and urgency over the past 4 months. In addition, she has noticed a change in the direction of her urination and can feel her bladder or uterus hanging low. Pt feels she is not completely emptying bladder and she has had episodes of urinary incontinence. She has not seen GYN. Advised pt to follow up with GYN for an evaluation, referred to Dr. Geo James. Health Maintenance: Pt due for mammogram in 3/2019. Current Outpatient Medications Medication Sig Dispense Refill  montelukast (SINGULAIR) 10 mg tablet Take 1 tab po daily prn 30 Tab 5  
 acetaminophen (TYLENOL) 325 mg tablet Take  by mouth every four (4) hours as needed for Pain.  albuterol (PROAIR HFA) 90 mcg/actuation inhaler Take 2 Puffs by inhalation every six (6) hours as needed for Wheezing or Shortness of Breath. 1 Inhaler 5  furosemide (LASIX) 20 mg tablet Take 1-2 Tabs by mouth daily. 60 Tab 0  
 albuterol (PROVENTIL VENTOLIN) 2.5 mg /3 mL (0.083 %) nebulizer solution 3 mL by Nebulization route every four (4) hours as needed for Wheezing. 72 Each 1  
 aspirin delayed-release 81 mg tablet Take 81 mg by mouth daily. Allergies Allergen Reactions  Sulfasalazine Hives  Bactrim [Sulfamethoprim Ds] Hives  Clindamycin Hives Past Medical History:  
Diagnosis Date  AR (allergic rhinitis) 2/19/2010  Chronic Venous Stasis BLE 10/14/2010  DJD (degenerative joint disease) of knee 2/19/2010  Factor V Leiden mutation ([facility] [address] 2/19/2010  
 HTN (hypertension) 2/19/2010  Plantar fasciitis 2/19/2010  Postmenopausal 2/19/2010  Stasis, venous 2/19/2010  Stroke ([facility] [address]  T. I.A. 2/19/2010  Venous stasis dermatitis 10/14/2010 Past Surgical History:  
Procedure Laterality Date  HX BACK SURGERY  1990  
 lumbar disc [address] [facility] Family History Problem Relation Age of Onset  Asthma Sister  Asthma Brother  MS Daughter  Allergic Rhinitis Son  Thyroid Disease Daughter Social History Tobacco Use  Smoking status: Never Smoker  Smokeless tobacco: Never Used Substance Use Topics  Alcohol use: No  
  
Review of Systems Constitutional: Negative for chills and fever. HENT: Negative for hearing loss and tinnitus. Eyes: Negative for blurred vision and double vision. Respiratory: Negative for shortness of breath. Cardiovascular: Negative for chest pain and palpitations. Gastrointestinal: Negative for nausea and vomiting. Genitourinary: Negative for dysuria and frequency. Musculoskeletal: Negative for back pain and falls. Skin: Negative for itching and rash. Neurological: Negative for dizziness, loss of consciousness and headaches. Psychiatric/Behavioral: Negative for depression. The patient is not nervous/anxious. Physical Exam  
Constitutional: She is oriented to person, place, and time. She appears well-developed and well-nourished. HENT:  
Head: Normocephalic and atraumatic. Right Ear: External ear normal.  
Left Ear: External ear normal.  
Nose: Nose normal.  
Mouth/Throat: Oropharynx is clear and moist.  
Eyes: Conjunctivae and EOM are normal.  
Neck: Normal range of motion. Neck supple. Carotid bruit is not present. No thyroid mass and no thyromegaly present. Cardiovascular: Normal rate, regular rhythm, S1 normal, S2 normal, normal heart sounds and intact distal pulses. Pulmonary/Chest: Effort normal and breath sounds normal.  
Abdominal: Soft. Normal appearance and bowel sounds are normal. There is no hepatosplenomegaly. There is no tenderness. Musculoskeletal: Normal range of motion. Neurological: She is alert and oriented to person, place, and time. She has normal strength. No cranial nerve deficit or sensory deficit. Coordination normal.  
Skin: Skin is warm, dry and intact. No abrasion and no rash noted. Psychiatric: She has a normal mood and affect. Her behavior is normal. Judgment and thought content normal.  
Nursing note and vitals reviewed. ASSESSMENT and PLAN Diagnoses and all orders for this visit: 
 
1. Uterine prolapse Advised pt to follow up with GYN for an evaluation, referred to Dr. Carolina Goodwin. -     REFERRAL TO OBSTETRICS AND GYNECOLOGY 2. Moderate persistent asthma with acute exacerbation Pt asthma exacerbated in Spring. She will start on Singulair next month 
-     montelukast (SINGULAIR) 10 mg tablet; Take 1 tab po daily prn 3. Screening for breast cancer Pt due for screening 3/2019; gave order. 4. Factor V Leiden mutation ([facility] [address] Assessment & Plan: Stable, based on history, physical exam and review of pertinent labs, studies and medications; meds reconciled; continue current treatment plan. Lab Results Component Value Date/Time WBC 6.0 08/21/2018 08:29 AM  
 HGB 14.3 08/21/2018 08:29 AM  
 HCT 43.1 08/21/2018 08:29 AM  
 PLATELET 248 56/05/5667 08:29 AM  
 Creatinine 0.63 08/21/2018 08:29 AM  
 BUN 23 08/21/2018 08:29 AM  
 Potassium 4.4 08/21/2018 08:29 AM  
 
 
 
5. Obesity, Class III, BMI 40-49.9 (morbid obesity) ([facility] [address] Assessment & Plan: This condition is managed by Specialist. 
Key Obesity Meds   
    
  
 furosemide (LASIX) 20 mg tablet Take 1-2 Tabs by mouth daily. Lab Results Component Value Date/Time Glucose 94 08/21/2018 08:29 AM  
 Cholesterol, total 170 08/21/2018 08:29 AM  
 HDL Cholesterol 57 08/21/2018 08:29 AM  
 LDL, calculated 100 08/21/2018 08:29 AM  
 Triglyceride 63 08/21/2018 08:29 AM  
 Sodium 141 08/21/2018 08:29 AM  
 Potassium 4.4 08/21/2018 08:29 AM  
 ALT (SGPT) 21 08/21/2018 08:29 AM  
 AST (SGOT) 23 08/21/2018 08:29 AM  
 
 
6. Visit for screening mammogram 
Pt due for screening 3/2019; gave order.  
-     [facility] MAMMO BI SCREENING INCL CAD; Future 7. Essential hypertension BP at goal today in office. Advised pt to continue to monitor BP at home. -     METABOLIC PANEL, COMPREHENSIVE 
-     CBC WITH AUTOMATED DIFF 
-     LIPID PANEL Follow-up Disposition: 
Return in about 6 months (around 8/11/2019) for hyperlipidemia follow up, hypertension follow up. Medication risks/benefits/costs/interactions/alternatives discussed with patient. Advised patient to call back or return to office if symptoms worsen/change/persist.  If patient cannot reach us or should anything more severe/urgent arise he/she should proceed directly to the nearest emergency department. Discussed expected course/resolution/complications of diagnosis in detail with patient. Patient given a written after visit summary which includes her diagnoses, current medications and vitals. Patient expressed understanding with the diagnosis and plan. This document was written by Shreya Dao, as dictated by Dr. Reinaldo Romberg, MD.  
 
2:52 PM - 3:04 PM 
Total time spent with the patient 12 minutes, greater than 50% of time spent counseling patient.

## 2019-02-11 NOTE — PROGRESS NOTES
Chief Complaint Patient presents with  Hypertension  
  follow up  Cholesterol Problem  
  follow up  Other ? prolapsed bladder Reviewed Record in preparation for visit and have obtained necessary documentation. Identified pt with two pt identifiers (Name @ ) Health Maintenance Due Topic  Shingrix Vaccine Age 50> (1 of 2)  Influenza Age 5 to Adult  BREAST CANCER SCRN MAMMOGRAM   
 
 
 
1. Have you been to the ER, urgent care clinic since your last visit? Hospitalized since your last visit? no 
 
2. Have you seen or consulted any other health care providers outside of the [address] [place] [address] since your last visit? Include any pap smears or colon screening.  no

## 2019-02-11 NOTE — ASSESSMENT & PLAN NOTE
Stable, based on history, physical exam and review of pertinent labs, studies and medications; meds reconciled; continue current treatment plan. Lab Results Component Value Date/Time  WBC 6.0 08/21/2018 08:29 AM  
 HGB 14.3 08/21/2018 08:29 AM  
 HCT 43.1 08/21/2018 08:29 AM  
 PLATELET 580 55/70/2250 08:29 AM  
 Creatinine 0.63 08/21/2018 08:29 AM  
 BUN 23 08/21/2018 08:29 AM  
 Potassium 4.4 08/21/2018 08:29 AM

## 2019-02-11 NOTE — PATIENT INSTRUCTIONS
Body Mass Index: Care Instructions Your Care Instructions Body mass index (BMI) can help you see if your weight is raising your risk for health problems. It uses a formula to compare how much you weigh with how tall you are. · A BMI lower than 18.5 is considered underweight. · A BMI between 18.5 and 24.9 is considered healthy. · A BMI between 25 and 29.9 is considered overweight. A BMI of 30 or higher is considered obese. If your BMI is in the normal range, it means that you have a lower risk for weight-related health problems. If your BMI is in the overweight or obese range, you may be at increased risk for weight-related health problems, such as high blood pressure, heart disease, stroke, arthritis or joint pain, and diabetes. If your BMI is in the underweight range, you may be at increased risk for health problems such as fatigue, lower protection (immunity) against illness, muscle loss, bone loss, hair loss, and hormone problems. BMI is just one measure of your risk for weight-related health problems. You may be at higher risk for health problems if you are not active, you eat an unhealthy diet, or you drink too much alcohol or use tobacco products. Follow-up care is a key part of your treatment and safety. Be sure to make and go to all appointments, and call your doctor if you are having problems. It's also a good idea to know your test results and keep a list of the medicines you take. How can you care for yourself at home? · Practice healthy eating habits. This includes eating plenty of fruits, vegetables, whole grains, lean protein, and low-fat dairy. · If your doctor recommends it, get more exercise. Walking is a good choice. Bit by bit, increase the amount you walk every day. Try for at least 30 minutes on most days of the week. · Do not smoke. Smoking can increase your risk for health problems.  If you need help quitting, talk to your doctor about stop-smoking programs and medicines. These can increase your chances of quitting for good. · Limit alcohol to 2 drinks a day for men and 1 drink a day for women. Too much alcohol can cause health problems. If you have a BMI higher than 25 · Your doctor may do other tests to check your risk for weight-related health problems. This may include measuring the distance around your waist. A waist measurement of more than 40 inches in men or 35 inches in women can increase the risk of weight-related health problems. · Talk with your doctor about steps you can take to stay healthy or improve your health. You may need to make lifestyle changes to lose weight and stay healthy, such as changing your diet and getting regular exercise. If you have a BMI lower than 18.5 · Your doctor may do other tests to check your risk for health problems. · Talk with your doctor about steps you can take to stay healthy or improve your health. You may need to make lifestyle changes to gain or maintain weight and stay healthy, such as getting more healthy foods in your diet and doing exercises to build muscle. Where can you learn more? Go to http://alina-shanice.info/. Enter S176 in the search box to learn more about \"Body Mass Index: Care Instructions. \" Current as of: June 25, 2018 Content Version: 11.9 © 0872-8458 [employer]. Care instructions adapted under license by [employer] (which disclaims liability or warranty for this information). If you have questions about a medical condition or this instruction, always ask your healthcare professional. [address] any warranty or liability for your use of this information.

## 2019-02-11 NOTE — ASSESSMENT & PLAN NOTE
This condition is managed by Specialist. 
Key Obesity Meds   
    
  
 furosemide (LASIX) 20 mg tablet Take 1-2 Tabs by mouth daily. Lab Results Component Value Date/Time  Glucose 94 08/21/2018 08:29 AM  
 Cholesterol, total 170 08/21/2018 08:29 AM  
 HDL Cholesterol 57 08/21/2018 08:29 AM  
 LDL, calculated 100 08/21/2018 08:29 AM  
 Triglyceride 63 08/21/2018 08:29 AM  
 Sodium 141 08/21/2018 08:29 AM  
 Potassium 4.4 08/21/2018 08:29 AM  
 ALT (SGPT) 21 08/21/2018 08:29 AM  
 AST (SGOT) 23 08/21/2018 08:29 AM

## 2019-12-11 ENCOUNTER — OFFICE VISIT (OUTPATIENT)
Dept: FAMILY MEDICINE CLINIC | Age: 69
End: 2019-12-11

## 2019-12-11 ENCOUNTER — HOSPITAL ENCOUNTER (OUTPATIENT)
Dept: LAB | Age: 69
Discharge: HOME | End: 2019-12-11
Payer: MEDICARE

## 2019-12-11 VITALS
OXYGEN SATURATION: 98 % | WEIGHT: 196.6 LBS | DIASTOLIC BLOOD PRESSURE: 76 MMHG | HEIGHT: 61 IN | RESPIRATION RATE: 18 BRPM | BODY MASS INDEX: 37.12 KG/M2 | HEART RATE: 72 BPM | SYSTOLIC BLOOD PRESSURE: 130 MMHG | TEMPERATURE: 97.7 F

## 2019-12-11 DIAGNOSIS — Z23 ENCOUNTER FOR IMMUNIZATION: ICD-10-CM

## 2019-12-11 DIAGNOSIS — Z00.00 ENCOUNTER FOR MEDICARE ANNUAL WELLNESS EXAM: ICD-10-CM

## 2019-12-11 DIAGNOSIS — J45.41 MODERATE PERSISTENT ASTHMA WITH ACUTE EXACERBATION: Primary | ICD-10-CM

## 2019-12-11 DIAGNOSIS — R68.2 DRY MOUTH: ICD-10-CM

## 2019-12-11 DIAGNOSIS — Z12.31 SCREENING MAMMOGRAM, ENCOUNTER FOR: ICD-10-CM

## 2019-12-11 DIAGNOSIS — E66.01 OBESITY, CLASS III, BMI 40-49.9 (MORBID OBESITY) (HCC): ICD-10-CM

## 2019-12-11 DIAGNOSIS — R53.81 MALAISE: ICD-10-CM

## 2019-12-11 DIAGNOSIS — Z13.220 LIPID SCREENING: ICD-10-CM

## 2019-12-11 DIAGNOSIS — M54.2 NECK DISCOMFORT: ICD-10-CM

## 2019-12-11 PROCEDURE — 86235 NUCLEAR ANTIGEN ANTIBODY: CPT

## 2019-12-11 PROCEDURE — 80061 LIPID PANEL: CPT

## 2019-12-11 PROCEDURE — 84443 ASSAY THYROID STIM HORMONE: CPT

## 2019-12-11 PROCEDURE — 84439 ASSAY OF FREE THYROXINE: CPT

## 2019-12-11 PROCEDURE — 80053 COMPREHEN METABOLIC PANEL: CPT

## 2019-12-11 PROCEDURE — 36415 COLL VENOUS BLD VENIPUNCTURE: CPT

## 2019-12-11 NOTE — PATIENT INSTRUCTIONS
Vaccine Information Statement Influenza (Flu) Vaccine (Inactivated or Recombinant): What You Need to Know Many Vaccine Information Statements are available in [place] and other languages. See www.immunize.org/vis Hojas de información sobre vacunas están disponibles en español y en muchos otros idiomas. Visite www.immunize.org/vis 1. Why get vaccinated? Influenza vaccine can prevent influenza (flu). Flu is a contagious disease that spreads around the [facility] every year, usually between October and May. Anyone can get the flu, but it is more dangerous for some people. Infants and young children, people 72years of age and older, pregnant women, and people with certain health conditions or a weakened immune system are at greatest risk of flu complications. Pneumonia, bronchitis, sinus infections and ear infections are examples of flu-related complications. If you have a medical condition, such as heart disease, cancer or diabetes, flu can make it worse. Flu can cause fever and chills, sore throat, muscle aches, fatigue, cough, headache, and runny or stuffy nose. Some people may have vomiting and diarrhea, though this is more common in children than adults. Each year thousands of people in the [facility] die from flu, and many more are hospitalized. Flu vaccine prevents millions of illnesses and flu-related visits to the doctor each year. 2. Influenza vaccines CDC recommends everyone 10months of age and older get vaccinated every flu season. Children 6 months through 6years of age may need 2 doses during a single flu season. Everyone else needs only 1 dose each flu season. It takes about 2 weeks for protection to develop after vaccination. There are many flu viruses, and they are always changing. Each year a new flu vaccine is made to protect against three or four viruses that are likely to cause disease in the upcoming flu season.  Even when the vaccine doesnt exactly match these viruses, it may still provide some protection. Influenza vaccine does not cause flu. Influenza vaccine may be given at the same time as other vaccines. 3. Talk with your health care provider Tell your vaccine provider if the person getting the vaccine: 
 Has had an allergic reaction after a previous dose of influenza vaccine, or has any severe, life-threatening allergies.  Has ever had Guillain-Barré Syndrome (also called GBS). In some cases, your health care provider may decide to postpone influenza vaccination to a future visit. People with minor illnesses, such as a cold, may be vaccinated. People who are moderately or severely ill should usually wait until they recover before getting influenza vaccine. Your health care provider can give you more information. 4. Risks of a reaction  Soreness, redness, and swelling where shot is given, fever, muscle aches, and headache can happen after influenza vaccine.  There may be a very small increased risk of Guillain-Barré Syndrome (GBS) after inactivated influenza vaccine (the flu shot). Junior Murry children who get the flu shot along with pneumococcal vaccine (PCV13), and/or DTaP vaccine at the same time might be slightly more likely to have a seizure caused by fever. Tell your health care provider if a child who is getting flu vaccine has ever had a seizure. People sometimes faint after medical procedures, including vaccination. Tell your provider if you feel dizzy or have vision changes or ringing in the ears. As with any medicine, there is a very remote chance of a vaccine causing a severe allergic reaction, other serious injury, or death. 5. What if there is a serious problem? An allergic reaction could occur after the vaccinated person leaves the clinic.  If you see signs of a severe allergic reaction (hives, swelling of the face and throat, difficulty breathing, a fast heartbeat, dizziness, or weakness), call 9-1-1 and get the person to the nearest hospital. 
 
For other signs that concern you, call your health care provider. Adverse reactions should be reported to the Vaccine Adverse Event Reporting System (VAERS). Your health care provider will usually file this report, or you can do it yourself. Visit the VAERS website at www.vaers. hhs.gov or call 6-746.784.9548. VAERS is only for reporting reactions, and VAERS staff do not give medical advice. 6. The National Vaccine Injury Compensation Program 
 
The [facility] Vaccine Injury Compensation Program (VICP) is a federal program that was created to compensate people who may have been injured by certain vaccines. Visit the VICP website at www.hrsa.gov/vaccinecompensation or call 0-835.667.7784 to learn about the program and about filing a claim. There is a time limit to file a claim for compensation. 7. How can I learn more?  Ask your health care provider.  Call your local or state health department.  Contact the Centers for Disease Control and Prevention (CDC): 
- Call 4-661.955.6292 (1-800-CDC-INFO) or 
- Visit CDCs influenza website at www.cdc.gov/flu Vaccine Information Statement (Interim) Inactivated Influenza Vaccine 8/15/2019 
[address] MAI Gonzalez [id number] Department of [facility] and [employer] Centers for Disease Control and Prevention Office Use Only

## 2019-12-11 NOTE — PROGRESS NOTES
Chief Complaint   Patient presents with    Asthma     follow up   [facility] Visit     1. Have you been to the ER, urgent care clinic since your last visit? Hospitalized since your last visit? No    2. Have you seen or consulted any other health care providers outside of the [address] [place] [address] since your last visit? Include any pap smears or colon screening.  No

## 2019-12-11 NOTE — PROGRESS NOTES
This is the Subsequent Medicare Annual Wellness Exam, performed 12 months or more after the Initial AWV or the last Subsequent AWV    I have reviewed the patient's medical history in detail and updated the computerized patient record. History     Patient Active Problem List   Diagnosis Code    HTN (hypertension) I10    T. I.A.  DJD (Degenerative Joint Disease) of Knees M17.10    Postmenopausal Z78.0    Plantar fasciitis M72.2    Factor V Leiden mutation ([facility] [address] D68.51    AR (allergic rhinitis) J30.9    Stasis, venous I87.8    Venous stasis dermatitis I87.2    Chronic Venous Stasis BLE I87.8    Obesity, Class III, BMI 40-49.9 (morbid obesity) ([facility]) E66.01    Osteopenia M85.80    Mild intermittent asthma without complication O18.88    ACP (advance care planning) Z71.89    Cortical age-related cataract of left eye H25.012    Cortical age-related cataract of right eye H25.011    PVD (posterior vitreous detachment), left eye H43.812     Past Medical History:   Diagnosis Date    AR (allergic rhinitis) 2010    Chronic Venous Stasis BLE 10/14/2010    DJD (degenerative joint disease) of knee 2010    Factor V Leiden mutation ([facility] [address] 2010    HTN (hypertension) 2010    Plantar fasciitis 2010    Postmenopausal 2010    Stasis, venous 2010    Stroke ([facility] [address]     T. I.A. 2010    Venous stasis dermatitis 10/14/2010      Past Surgical History:   Procedure Laterality Date    HX BACK SURGERY      lumbar disc    HX  SECTION      HX HYSTERECTOMY  2019    total     Current Outpatient Medications   Medication Sig Dispense Refill    montelukast (SINGULAIR) 10 mg tablet Take 1 tab po daily prn 30 Tab 5    albuterol (PROAIR HFA) 90 mcg/actuation inhaler Take 2 Puffs by inhalation every six (6) hours as needed for Wheezing or Shortness of Breath. 1 Inhaler 5    furosemide (LASIX) 20 mg tablet Take 1-2 Tabs by mouth daily.  60 Tab 0    albuterol (PROVENTIL VENTOLIN) 2.5 mg /3 mL (0.083 %) nebulizer solution 3 mL by Nebulization route every four (4) hours as needed for Wheezing. 72 Each 1    acetaminophen (TYLENOL) 325 mg tablet Take  by mouth every four (4) hours as needed for Pain.  aspirin delayed-release 81 mg tablet Take 81 mg by mouth daily. Allergies   Allergen Reactions    Sulfasalazine Hives    Bactrim [Sulfamethoprim Ds] Hives    Clindamycin Hives       Family History   Problem Relation Age of Onset   [address] [facility] Asthma Sister     Asthma Brother     MS Daughter     Allergic Rhinitis Son     Thyroid Disease Daughter      Social History     Tobacco Use    Smoking status: Never Smoker    Smokeless tobacco: Never Used   Substance Use Topics    Alcohol use: No       Depression Risk Factor Screening:     3 most recent PHQ Screens 12/11/2019   PHQ Not Done -   Little interest or pleasure in doing things Not at all   Feeling down, depressed, irritable, or hopeless Not at all   Total Score PHQ 2 0       Alcohol Risk Factor Screening:   Do you average 1 drink per night or more than 7 drinks a week:  No    On any one occasion in the past three months have you have had more than 3 drinks containing alcohol:  No      Functional Ability and Level of Safety:   Hearing: Hearing is good. Activities of Daily Living: The home contains: no safety equipment. Patient does total self care    Ambulation: with no difficulty    Fall Risk:  Fall Risk Assessment, last 12 mths 12/11/2019   Able to walk? Yes   Fall in past 12 months?  No   Fall with injury? -   Number of falls in past 12 months -   Fall Risk Score -       Abuse Screen:  Patient is not abused    Cognitive Screening   Has your family/caregiver stated any concerns about your memory: no      Patient Care Team   Patient Care Team:  Julianne Caballero MD as PCP - General (Family Practice)  Julianne Caballero MD as PCP - [facility] Empaneled Provider  Bryan Michelle MD as Physician (Obstetrics & Gynecology)    Assessment/Plan   Education and counseling provided:  Are appropriate based on today's review and evaluation    Diagnoses and all orders for this visit:    1.  Encounter for immunization  -     INFLUENZA VACCINE INACTIVATED (IIV), SUBUNIT, ADJUVANTED, IM  -     ADMIN INFLUENZA VIRUS VAC  -     PNEUMOCOCCAL POLYSACCHARIDE VACCINE, 23-VALENT, ADULT OR IMMUNOSUPPRESSED PT DOSE,  -     AR IMMUNIZ ADMIN,1 SINGLE/COMB VAC/TOXOID        Health Maintenance Due   Topic Date Due    Shingrix Vaccine Age 49> (1 of 2) 04/08/2000    MEDICARE YEARLY EXAM  02/11/2019    BREAST CANCER SCRN MAMMOGRAM  03/21/2019    GLAUCOMA SCREENING Q2Y  09/13/2019

## 2019-12-11 NOTE — PROGRESS NOTES
HPI  Zaira Segovia 71 y.o. female  presents to the office today for AWV. Blood pressure 130/76, pulse 72, temperature 97.7 °F (36.5 °C), temperature source Oral, resp. rate 18, height 5' 1\" (1.549 m), weight 196 lb 9.6 oz (89.2 kg), last menstrual period 03/02/2001, SpO2 98 %. Body mass index is 37.15 kg/m². Chief Complaint   Patient presents with    Asthma     follow up   [address] [place] [address] Annual Wellness Visit        Pt complains of dry mouth at night which wakes her up. Pt never uses humidifier. Pt took Benadryl in the past but stopped due to experiencing dry mouth. Pt is currently taking Singulair but not regularly. Pt notes that it has gotten better, but the dryness persists. Pt reports no asthma attacks and has not been using her inhaler x 1 year. Pt complains of neck grinding when moving her neck. Pt states that the feeling is strange and that she cannot explain it. Denies numbness or weakness down her arms. Lipid screening: Lipid panel on 8/21/18 notable for total cholesterol 170, HDL 57, , and triglycerides 63. Obesity: I have reviewed/discussed the above normal BMI with the patient. Health Maintenance: Medicare questionnaire discussed and responses reviewed today in office. Pt is due for flu vaccine and pneumonia vaccine. Discussed with pt the importance of Shingrix vaccine and informed pt that wait time could take up to 3 months; pt worries if the vaccine will be covered by her insurance. Pt is due for mammogram and requests for phone # to make appointment. Current Outpatient Medications   Medication Sig Dispense Refill    varicella-zoster recombinant, PF, (SHINGRIX, PF,) 50 mcg/0.5 mL susr injection 0.5 mL by IntraMUSCular route once for 1 dose. 0.5 mL 1    montelukast (SINGULAIR) 10 mg tablet Take 1 tab po daily prn 30 Tab 5    albuterol (PROAIR HFA) 90 mcg/actuation inhaler Take 2 Puffs by inhalation every six (6) hours as needed for Wheezing or Shortness of Breath.  1 Inhaler 5    furosemide (LASIX) 20 mg tablet Take 1-2 Tabs by mouth daily. 60 Tab 0    albuterol (PROVENTIL VENTOLIN) 2.5 mg /3 mL (0.083 %) nebulizer solution 3 mL by Nebulization route every four (4) hours as needed for Wheezing. 72 Each 1    acetaminophen (TYLENOL) 325 mg tablet Take  by mouth every four (4) hours as needed for Pain.  aspirin delayed-release 81 mg tablet Take 81 mg by mouth daily. Allergies   Allergen Reactions    Sulfasalazine Hives    Bactrim [Sulfamethoprim Ds] Hives    Clindamycin Hives     Past Medical History:   Diagnosis Date    AR (allergic rhinitis) 2010    Chronic Venous Stasis BLE 10/14/2010    DJD (degenerative joint disease) of knee 2010    Factor V Leiden mutation ([facility] [address] 2010    HTN (hypertension) 2010    Plantar fasciitis 2010    Postmenopausal 2010    Stasis, venous 2010    Stroke ([facility] [address]     T. I.A. 2010    Venous stasis dermatitis 10/14/2010     Past Surgical History:   Procedure Laterality Date    HX BACK SURGERY      lumbar disc    HX  SECTION      HX HYSTERECTOMY  2019    total     Family History   Problem Relation Age of Onset    Asthma Sister     Asthma Brother     MS Daughter     Allergic Rhinitis Son     Thyroid Disease Daughter      Social History     Tobacco Use    Smoking status: Never Smoker    Smokeless tobacco: Never Used   Substance Use Topics    Alcohol use: No        Review of Systems   Constitutional: Negative for chills and fever. HENT: Negative for hearing loss and tinnitus. Eyes: Negative for blurred vision and double vision. Respiratory: Negative for cough and shortness of breath. Cardiovascular: Negative for chest pain and palpitations. Gastrointestinal: Negative for nausea and vomiting. Genitourinary: Negative for dysuria and frequency. Musculoskeletal: Positive for neck pain. Negative for back pain and falls.    Skin: Negative for itching and rash.   Neurological: Negative for dizziness, loss of consciousness and headaches. Psychiatric/Behavioral: Negative for depression. The patient is not nervous/anxious. Physical Exam  Vitals signs and nursing note reviewed. Constitutional:       Appearance: Normal appearance. She is well-developed. HENT:      Head: Normocephalic and atraumatic. Right Ear: External ear normal.      Left Ear: External ear normal.      Nose: Nose normal.      Mouth/Throat:      Mouth: Mucous membranes are dry. Eyes:      Conjunctiva/sclera: Conjunctivae normal.      Pupils: Pupils are equal, round, and reactive to light. Neck:      Musculoskeletal: Normal range of motion and neck supple. Cardiovascular:      Rate and Rhythm: Normal rate and regular rhythm. Pulses: Normal pulses. Heart sounds: Normal heart sounds. Pulmonary:      Effort: Pulmonary effort is normal.      Breath sounds: Normal breath sounds. Abdominal:      General: Bowel sounds are normal.      Palpations: Abdomen is soft. Musculoskeletal: Normal range of motion. Skin:     General: Skin is warm and dry. Neurological:      Mental Status: She is alert and oriented to person, place, and time. Psychiatric:         Speech: Speech normal.         Behavior: Behavior normal.         Thought Content: Thought content normal.         Judgment: Judgment normal.           ASSESSMENT and PLAN  Diagnoses and all orders for this visit:    1. Moderate persistent asthma with acute exacerbation  Pt reports no asthma attacks and has not been using her inhaler x 1 year. 2. Dry mouth  Suspected that Singulair could be causing dry mouth. Will check Sjogren's antibodies to rule out autoimmune. Advised pt to invest in a humidifier which might help with the condition at night.   -     SJOGREN'S ABS, SSA AND SSB    3. Screening mammogram, encounter for  -     [facility] MAMMO BI SCREENING INCL CAD; Future    4.  Malaise  -     TSH 3RD GENERATION  -     T4, FREE    5. Encounter for immunization  Flu vaccine and PMA23 administered today in office. Provided pt with prescription and will follow up with local pharmacy to inquire about out of pocket cost.   -     INFLUENZA VACCINE INACTIVATED (IIV), SUBUNIT, ADJUVANTED, IM  -     ADMIN INFLUENZA VIRUS VAC  -     PNEUMOCOCCAL POLYSACCHARIDE VACCINE, 23-VALENT, ADULT OR IMMUNOSUPPRESSED PT DOSE,  -     [place] IMMUNIZ ADMIN,1 SINGLE/COMB VAC/TOXOID  -     varicella-zoster recombinant, PF, (SHINGRIX, PF,) 50 mcg/0.5 mL susr injection; 0.5 mL by IntraMUSCular route once for 1 dose. 6. Lipid screening  Presumed stable, will assess levels today. -     METABOLIC PANEL, COMPREHENSIVE  -     LIPID PANEL    7. Obesity, Class III, BMI 40-49.9 (morbid obesity) ([facility] [address]  I have reviewed/discussed the above normal BMI with the patient. I have recommended the following interventions: dietary management education, guidance, and counseling, encourage exercise and monitor weight . 8. Neck discomfort  Suspected early stage of arthritis. Advised pt to avoid weight-bearing activities that might put pressure on area. Discussed with pt that we can take precaution with imaging studies, but pt decides to hold off on this option and will let me know if the condition worsens. Follow-up and Dispositions    · Return in about 6 months (around 6/11/2020) for chronic follow up. Medication risks/benefits/costs/interactions/alternatives discussed with patient. Advised patient to call back or return to office if symptoms worsen/change/persist.  If patient cannot reach us or should anything more severe/urgent arise he/she should proceed directly to the nearest emergency department. Discussed expected course/resolution/complications of diagnosis in detail with patient. Patient given a written after visit summary which includes her diagnoses, current medications and vitals.   Patient expressed understanding with the diagnosis and plan.    Written by elvira Collier, as dictated by Lizeth Vera M.D.    9:36 AM - 9:51 AM    Total time spent with the patient 15 minutes, greater than 50% of time spent counseling patient.

## 2019-12-12 LAB
ALBUMIN SERPL-MCNC: 4.8 G/DL (ref 3.6–4.8)
ALBUMIN/GLOB SERPL: 2 {RATIO} (ref 1.2–2.2)
ALP SERPL-CCNC: 84 IU/L (ref 39–117)
ALT SERPL-CCNC: 13 IU/L (ref 0–32)
AST SERPL-CCNC: 21 IU/L (ref 0–40)
BILIRUB SERPL-MCNC: 0.3 MG/DL (ref 0–1.2)
BUN SERPL-MCNC: 17 MG/DL (ref 8–27)
BUN/CREAT SERPL: 25 (ref 12–28)
CALCIUM SERPL-MCNC: 10 MG/DL (ref 8.7–10.3)
CHLORIDE SERPL-SCNC: 106 MMOL/L (ref 96–106)
CHOLEST SERPL-MCNC: 199 MG/DL (ref 100–199)
CO2 SERPL-SCNC: 22 MMOL/L (ref 20–29)
CREAT SERPL-MCNC: 0.68 MG/DL (ref 0.57–1)
ENA SS-A AB SER-ACNC: <0.2 AI (ref 0–0.9)
ENA SS-B AB SER-ACNC: <0.2 AI (ref 0–0.9)
GLOBULIN SER CALC-MCNC: 2.4 G/DL (ref 1.5–4.5)
GLUCOSE SERPL-MCNC: 105 MG/DL (ref 65–99)
HDLC SERPL-MCNC: 64 MG/DL
INTERPRETATION, 910389: NORMAL
LDLC SERPL CALC-MCNC: 121 MG/DL (ref 0–99)
POTASSIUM SERPL-SCNC: 4.5 MMOL/L (ref 3.5–5.2)
PROT SERPL-MCNC: 7.2 G/DL (ref 6–8.5)
SODIUM SERPL-SCNC: 145 MMOL/L (ref 134–144)
T4 FREE SERPL-MCNC: 1.22 NG/DL (ref 0.82–1.77)
TRIGL SERPL-MCNC: 71 MG/DL (ref 0–149)
TSH SERPL DL<=0.005 MIU/L-ACNC: 3.36 UIU/ML (ref 0.45–4.5)
VLDLC SERPL CALC-MCNC: 14 MG/DL (ref 5–40)

## 2019-12-13 ENCOUNTER — HOSPITAL ENCOUNTER (OUTPATIENT)
Dept: MAMMOGRAPHY | Age: 69
Discharge: HOME | End: 2019-12-13
Attending: FAMILY MEDICINE
Payer: MEDICARE

## 2019-12-13 DIAGNOSIS — Z12.31 SCREENING MAMMOGRAM, ENCOUNTER FOR: ICD-10-CM

## 2019-12-13 PROCEDURE — 77067 SCR MAMMO BI INCL CAD: CPT

## 2019-12-13 NOTE — PROGRESS NOTES
Inform pt to go to my chart to see results and recommendations    Labs are normal    Continue current treatment plan

## 2019-12-17 NOTE — PROGRESS NOTES
[phone] Spoke to patient Identified pt with two pt identifiers (Name @ )  Notified patient of her lab results and understand

## 2020-01-20 ENCOUNTER — TELEPHONE (OUTPATIENT)
Dept: FAMILY MEDICINE CLINIC | Age: 70
End: 2020-01-20

## 2020-01-20 DIAGNOSIS — M54.2 NECK PAIN: Primary | ICD-10-CM

## 2020-01-20 NOTE — TELEPHONE ENCOUNTER
----- Message from Petelisandra Zapata sent at 2020  2:10 PM EST -----  Regarding: Dr. Aleida Dozier: [phone]  Caller's first and last name: Jay Braswell,  daughter  Reason for call: Update  Callback required yes/no and why: Yes, caller would like to know if the imaging equipment is back up and running and to make sure there is an order in for pt, Dr. Jonaa Harrison wanted pt to get images of her neck, but the machine was down before. Best contact number(s): [phone]. .Outbound call to , Pt  verified,   Pt stated that Ruslan Osborne wanted to do an X-Ray of neck, her symptoms since december  Neck cracking when moved to right or left  uncomfortable feeling .    Requesting order put in so she can go to [facility] to get the x-ray done

## 2020-01-24 NOTE — TELEPHONE ENCOUNTER
Calling back and now ready to have neck studies done . What did you want her to have , Xray of neck ?

## 2020-01-28 ENCOUNTER — HOSPITAL ENCOUNTER (OUTPATIENT)
Dept: GENERAL RADIOLOGY | Age: 70
Discharge: HOME | End: 2020-01-28
Payer: MEDICARE

## 2020-01-28 DIAGNOSIS — M54.2 NECK PAIN: ICD-10-CM

## 2020-01-28 PROCEDURE — 72052 X-RAY EXAM NECK SPINE 6/>VWS: CPT

## 2020-01-29 NOTE — PROGRESS NOTES
We need to get an MRI of the C spine without contrast    AP, AP open-mouth odontoid, lateral, swimmer's lateral and oblique views of the  cervical spine were obtained. There is evidence of moderate spondylosis in the  lower cervical region. Specifically, intervertebral disc space narrowing is  noted at the C5-6 and C7 levels. Prominent anterior osteophyte formation is  noted at these levels. There is evidence of posterior osteophyte  formation/neural foraminal encroachment at the C4-5 and C5-6 levels bilaterally.     IMPRESSION  IMPRESSION: Evidence of moderate cervical spondylosis as described above.

## 2020-02-04 DIAGNOSIS — M47.812 CERVICAL SPONDYLOSIS: Primary | ICD-10-CM

## 2020-02-04 NOTE — PROGRESS NOTES
Called and results reviewed with patient .  Informed her that she will be called to set up an MRI C-spine without contrast.

## 2020-02-11 ENCOUNTER — TELEPHONE (OUTPATIENT)
Dept: FAMILY MEDICINE CLINIC | Age: 70
End: 2020-02-11

## 2020-02-11 NOTE — TELEPHONE ENCOUNTER
----- Message from Bruce Nickerson sent at 2/11/2020 11:02 AM EST -----  Regarding: Dylon/ Telephone  Contact: [phone]  Caller's first and last name: n/a   Reason for call: Pt stated that she is waiting on a call back for her MRI being scheduled. Please verify with the pt on this matter. Pt stated she would like to go to [facility] for the procedure.    Callback required yes/no and why: yes   Best contact number(s): [phone]  Details to clarify the request: n/a

## 2020-02-11 NOTE — TELEPHONE ENCOUNTER
[phone] notified patient she needs to call [facility] Sched[facility] [phone] to set up appointment patient will call them directly

## 2020-02-18 ENCOUNTER — HOSPITAL ENCOUNTER (OUTPATIENT)
Dept: MRI IMAGING | Age: 70
Discharge: HOME | End: 2020-02-18
Attending: FAMILY MEDICINE
Payer: MEDICARE

## 2020-02-18 DIAGNOSIS — M47.812 CERVICAL SPONDYLOSIS: ICD-10-CM

## 2020-02-18 PROCEDURE — 72141 MRI NECK SPINE W/O DYE: CPT

## 2020-02-24 NOTE — PROGRESS NOTES
We need to get patient in with Dr. Lidia Pereira referral      FINDINGS: Alignment is normal. Minimal endplate degenerative changes. Canal is  congenitally narrowed. The visualized posterior fossa and cord signal are  normal. Paraspinous soft tissues are within normal limits.     Craniocervical junction: Intact. Without stenosis     C2-C3: There is ankylosis of the left facets. Canal is congenitally narrowed.     C3-C4: Canal is congenitally narrowed with left facet arthropathy. Overall canal  stenosis is moderate flattening of the left side of the cord with moderate left  foraminal narrowing. Left foraminal stenosis has progressed     C4-C5: Diffuse disc osteophytic bulge. This is superimposed upon a congenitally  narrowed canal. Mild left facet her pertinent atrophy. Canal stenosis is  moderate to severe with flattening of left side of the cord with mild to  moderate left foraminal narrowing. Not significant changed     C5-C6: There is a diffuse disc osteophytic bulge with uncovertebral degenerative  change worse on the right. Canal stenosis is moderate to severe with flattening  the ventral cord with severe right and mild left foraminal narrowing this has  progressed     C6-C7: There is a diffuse disc osteophytic bulge with thickening of the  ligamentum flavum. This results in severe narrowing of the canal with flattening  of the cord and no significant foraminal narrowing. This has progressed     C7-T1: There are left-sided uncovertebral degenerative changes with a left  paracentral and foraminal protrusion. This results in mild narrowing of the left  foramen and canal.     IMPRESSION  IMPRESSION:  1. 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   Canal stenosis is severe at C6-7 and moderate to severe at C4-5 and C5-6

## 2020-02-27 ENCOUNTER — TELEPHONE (OUTPATIENT)
Dept: FAMILY MEDICINE CLINIC | Age: 70
End: 2020-02-27

## 2020-02-27 DIAGNOSIS — M54.2 NECK PAIN: Primary | ICD-10-CM

## 2020-02-27 NOTE — TELEPHONE ENCOUNTER
----- Message from Reny Mckee sent at 2/27/2020  2:04 PM EST -----  Regarding: /Telephone  General Message/Vendor Calls    Caller's first and last name:      Reason for call:  States she does not have all the information she needs for the orthopedic    Callback required yes/no and why:  Yes, to let her know when her appointment has been made. Best contact number(s):  [phone]    Details to clarify the request:  Pt states she can do anytime.     Reny Mckee

## 2020-02-27 NOTE — PROGRESS NOTES
[phone] Spoke to patient Identified pt with two pt identifiers (Name @ )  Notified patient of her MRI results and understand  Gave her Dr Maicol Alexandra information she will call them directly  Per Dr Nuria de la cruz to order referral

## 2020-02-28 NOTE — TELEPHONE ENCOUNTER
[phone] spoke to patient wants us to make appointment for Dr. Rico Gilman office I advised will send referral to [facility] and will set up appointment for her patient understand

## 2020-08-04 ENCOUNTER — OFFICE VISIT (OUTPATIENT)
Dept: FAMILY MEDICINE CLINIC | Age: 70
End: 2020-08-04
Payer: MEDICARE

## 2020-08-04 VITALS
DIASTOLIC BLOOD PRESSURE: 84 MMHG | SYSTOLIC BLOOD PRESSURE: 130 MMHG | BODY MASS INDEX: 35.8 KG/M2 | TEMPERATURE: 98.2 F | HEART RATE: 68 BPM | HEIGHT: 61 IN | OXYGEN SATURATION: 98 % | RESPIRATION RATE: 22 BRPM | WEIGHT: 189.6 LBS

## 2020-08-04 DIAGNOSIS — E66.09 CLASS 2 OBESITY DUE TO EXCESS CALORIES WITHOUT SERIOUS COMORBIDITY WITH BODY MASS INDEX (BMI) OF 35.0 TO 35.9 IN ADULT: ICD-10-CM

## 2020-08-04 DIAGNOSIS — D68.51 FACTOR V LEIDEN MUTATION (HCC): ICD-10-CM

## 2020-08-04 DIAGNOSIS — M54.2 NECK PAIN: ICD-10-CM

## 2020-08-04 DIAGNOSIS — J45.41 MODERATE PERSISTENT ASTHMA WITH ACUTE EXACERBATION: Primary | ICD-10-CM

## 2020-08-04 DIAGNOSIS — E66.01 SEVERE OBESITY (HCC): ICD-10-CM

## 2020-08-04 DIAGNOSIS — M67.431 GANGLION CYST OF WRIST, RIGHT: ICD-10-CM

## 2020-08-04 PROCEDURE — 3017F COLORECTAL CA SCREEN DOC REV: CPT | Performed by: FAMILY MEDICINE

## 2020-08-04 PROCEDURE — G8536 NO DOC ELDER MAL SCRN: HCPCS | Performed by: FAMILY MEDICINE

## 2020-08-04 PROCEDURE — G8399 PT W/DXA RESULTS DOCUMENT: HCPCS | Performed by: FAMILY MEDICINE

## 2020-08-04 PROCEDURE — G9899 SCRN MAM PERF RSLTS DOC: HCPCS | Performed by: FAMILY MEDICINE

## 2020-08-04 PROCEDURE — 1101F PT FALLS ASSESS-DOCD LE1/YR: CPT | Performed by: FAMILY MEDICINE

## 2020-08-04 PROCEDURE — G8754 DIAS BP LESS 90: HCPCS | Performed by: FAMILY MEDICINE

## 2020-08-04 PROCEDURE — G8417 CALC BMI ABV UP PARAM F/U: HCPCS | Performed by: FAMILY MEDICINE

## 2020-08-04 PROCEDURE — 1090F PRES/ABSN URINE INCON ASSESS: CPT | Performed by: FAMILY MEDICINE

## 2020-08-04 PROCEDURE — G8427 DOCREV CUR MEDS BY ELIG CLIN: HCPCS | Performed by: FAMILY MEDICINE

## 2020-08-04 PROCEDURE — G8510 SCR DEP NEG, NO PLAN REQD: HCPCS | Performed by: FAMILY MEDICINE

## 2020-08-04 PROCEDURE — 99214 OFFICE O/P EST MOD 30 MIN: CPT | Performed by: FAMILY MEDICINE

## 2020-08-04 PROCEDURE — G8752 SYS BP LESS 140: HCPCS | Performed by: FAMILY MEDICINE

## 2020-08-04 RX ORDER — MONTELUKAST SODIUM 10 MG/1
TABLET ORAL
Qty: 90 TAB | Refills: 1 | Status: SHIPPED | OUTPATIENT
Start: 2020-08-04 | End: 2021-08-03 | Stop reason: SDUPTHER

## 2020-08-04 NOTE — PROGRESS NOTES
Chief Complaint   Patient presents with    Asthma     Follow up visit     Medication Refill     singulair     Spoke to patient Identified pt with two pt identifiers (Name @ )    1. Have you been to the ER, urgent care clinic since your last visit? Hospitalized since your last visit? No    2. Have you seen or consulted any other health care providers outside of the [address] [place] [address] since your last visit? Include any pap smears or colon screening.  No     \"REVIEWED RECORD IN PREPARATION FOR VISIT AND HAVE OBTAINED NECESSARY DOCUMENTATION\"

## 2020-08-04 NOTE — PROGRESS NOTES
CHAPIN  Valentino Mccann 79 y.o. female  presents to the office today for a follow-up of asthma    Blood pressure 130/84, pulse 68, temperature 98.2 °F (36.8 °C), temperature source Oral, resp. rate 22, height 5' 1\" (1.549 m), weight 189 lb 9.6 oz (86 kg), last menstrual period 03/02/2001, SpO2 98 %. Body mass index is 35.82 kg/m². Chief Complaint   Patient presents with    Asthma     Follow up visit     Medication Refill     singulair      Obesity: I have reviewed/discussed the above normal BMI with the patient. I have recommended the following interventions: dietary management education, guidance, and counseling, encourage exercise and monitor weight. Pt states that she walks 6-7 miles 3-5x/week and she recently purchased a stationary bike. She does not wish to go to the gym due to the COVID-19 pandemic. Asthma: Pt requests a refill for her asthma medication Singulair 10 mg/PRN. Pt informs me that she has not used her inhaler in months and she feels good. She states that the Singulair seems to be sufficient for her asthma. Factor V Leiden mutation: Pt states she does not want a referral to hematology. Health Maintenance: Medicare questionnaire discussed and responses reviewed today in office. Pt states she was advised to go to physical therapy by Dr. Terrence Willard for her neck pain, but she is worried to go due to the COVID-19 pandemic. Pt shows me a cyst on her right wrist. She says that it occasionally hurts and that it randomly \"puffs up\". Located on the lateral aspect of right wrist, the site is 1 inch by 1 inch, soft to the touch, and appears to be filled with colloidfluid. I diagnosed it as a ganglion cyst and offered to provide pt with a dermatologist referral in the future if she wishes to have it removed. I advised pt to come in for a follow-up appointment in 6 months.      Current Outpatient Medications   Medication Sig Dispense Refill    montelukast (SINGULAIR) 10 mg tablet Take 1 tab po daily prn 90 Tab 1    albuterol (PROAIR HFA) 90 mcg/actuation inhaler Take 2 Puffs by inhalation every six (6) hours as needed for Wheezing or Shortness of Breath. 1 Inhaler 5    furosemide (LASIX) 20 mg tablet Take 1-2 Tabs by mouth daily. 60 Tab 0    albuterol (PROVENTIL VENTOLIN) 2.5 mg /3 mL (0.083 %) nebulizer solution 3 mL by Nebulization route every four (4) hours as needed for Wheezing. 72 Each 1    acetaminophen (TYLENOL) 325 mg tablet Take  by mouth every four (4) hours as needed for Pain.  aspirin delayed-release 81 mg tablet Take 81 mg by mouth daily. Allergies   Allergen Reactions    Sulfasalazine Hives    Bactrim [Sulfamethoprim Ds] Hives    Clindamycin Hives     Past Medical History:   Diagnosis Date    AR (allergic rhinitis) 2010    Chronic Venous Stasis BLE 10/14/2010    DJD (degenerative joint disease) of knee 2010    Factor V Leiden mutation ([facility] [address] 2010    HTN (hypertension) 2010    Plantar fasciitis 2010    Postmenopausal 2010    Stasis, venous 2010    Stroke ([facility] [address]     T. I.A. 2010    Venous stasis dermatitis 10/14/2010     Past Surgical History:   Procedure Laterality Date    HX BACK SURGERY      lumbar disc    HX  SECTION      HX HYSTERECTOMY  2019    total     Family History   Problem Relation Age of Onset    Asthma Sister     Asthma Brother     MS Daughter     Allergic Rhinitis Son     Thyroid Disease Daughter      Social History     Tobacco Use    Smoking status: Never Smoker    Smokeless tobacco: Never Used   Substance Use Topics    Alcohol use: No        Review of Systems   Constitutional: Negative for chills and fever. HENT: Negative for hearing loss and tinnitus. Eyes: Negative for blurred vision and double vision. Respiratory: Negative for cough and shortness of breath. Cardiovascular: Negative for chest pain and palpitations.    Gastrointestinal: Negative for nausea and vomiting. Genitourinary: Negative for dysuria and frequency. Musculoskeletal: Negative for back pain and falls. Skin: Negative for itching and rash. Neurological: Negative for dizziness, loss of consciousness and headaches. Endo/Heme/Allergies: Negative. Psychiatric/Behavioral: Negative for depression. The patient is not nervous/anxious. Physical Exam  Constitutional:       Appearance: Normal appearance. HENT:      Head: Normocephalic and atraumatic. Right Ear: Tympanic membrane, ear canal and external ear normal.      Left Ear: Tympanic membrane, ear canal and external ear normal.      Nose: Nose normal.      Mouth/Throat:      Mouth: Mucous membranes are moist.      Pharynx: Oropharynx is clear. Eyes:      Extraocular Movements: Extraocular movements intact. Conjunctiva/sclera: Conjunctivae normal.      Pupils: Pupils are equal, round, and reactive to light. Cardiovascular:      Rate and Rhythm: Normal rate and regular rhythm. Pulses: Normal pulses. Heart sounds: Normal heart sounds. Pulmonary:      Effort: Pulmonary effort is normal.      Breath sounds: Normal breath sounds. Abdominal:      General: Abdomen is flat. Bowel sounds are normal.      Palpations: Abdomen is soft. Genitourinary:     General: Normal vulva. Rectum: Normal.   Musculoskeletal: Normal range of motion. Skin:     General: Skin is warm and dry. Neurological:      General: No focal deficit present. Mental Status: She is alert and oriented to person, place, and time. Mental status is at baseline. Psychiatric:         Mood and Affect: Mood normal.         Behavior: Behavior normal.         Judgment: Judgment normal.           ASSESSMENT and PLAN  Diagnoses and all orders for this visit:    1. Moderate persistent asthma with acute exacerbation  Pt requests a refill for her asthma medication Singulair 10 mg/PRN.  Pt informs me that she has not used her inhaler in months and she feels good. She states that the Singulair seems to be sufficient for her asthma.    -     montelukast (SINGULAIR) 10 mg tablet; Take 1 tab po daily prn    2. Factor V Leiden mutation ([address]  Pt states she does not want a referral to hematology. 3. Neck pain  Pt states she was advised to go to physical therapy by Dr. Topher Nuñez for her neck pain, but she is worried to go due to the COVID-19 pandemic. 4. Class 2 obesity due to excess calories without serious comorbidity with body mass index (BMI) of 35.0 to 35.9 in adult  I have reviewed/discussed the above normal BMI with the patient. I have recommended the following interventions: dietary management education, guidance, and counseling, encourage exercise and monitor weight. Pt states that she walks 6-7 miles 3-5x/week and she recently purchased a stationary bike. She does not wish to go to the gym due to the COVID-19 pandemic. She has dropped her BMI by 10 units since her last visit. 5. Ganglion cyst of wrist, right  Pt shows me a cyst on her right wrist. She says that it occasionally hurts and that it randomly \"puffs up\". Located on the lateral aspect of right wrist, the site is 1 inch by 1 inch, soft to the touch, and appears to be filled with colloidfluid. I diagnosed it as a ganglion cyst and offered to provide pt with a dermatologist referral in the future if she wishes to have it removed. 6. Severe obesity ([address]  See #4. Follow-up and Dispositions    · Return in about 6 months (around 2/4/2021) for asthma. Medication risks/benefits/costs/interactions/alternatives discussed with patient. Advised patient to call back or return to office if symptoms worsen/change/persist.  If patient cannot reach us or should anything more severe/urgent arise he/she should proceed directly to the nearest emergency department. Discussed expected course/resolution/complications of diagnosis in detail with patient.     Patient given a written after visit summary which includes diagnoses, current medications and vitals. Patient expressed understanding with the diagnosis and plan. Written by elvira Azar, as dictated by Tonia Apgar, M.D.    2:44 PM - 3:06 PM    Total time spent with the patient 22 minutes, greater than 50% of time spent counseling patient.

## 2021-02-02 ENCOUNTER — OFFICE VISIT (OUTPATIENT)
Dept: FAMILY MEDICINE CLINIC | Age: 71
End: 2021-02-02
Payer: MEDICARE

## 2021-02-02 VITALS
WEIGHT: 188.2 LBS | OXYGEN SATURATION: 100 % | SYSTOLIC BLOOD PRESSURE: 120 MMHG | TEMPERATURE: 98 F | HEART RATE: 78 BPM | HEIGHT: 61 IN | BODY MASS INDEX: 35.53 KG/M2 | DIASTOLIC BLOOD PRESSURE: 77 MMHG | RESPIRATION RATE: 18 BRPM

## 2021-02-02 DIAGNOSIS — E66.01 SEVERE OBESITY (HCC): ICD-10-CM

## 2021-02-02 DIAGNOSIS — Z13.220 LIPID SCREENING: ICD-10-CM

## 2021-02-02 DIAGNOSIS — Z23 ENCOUNTER FOR IMMUNIZATION: ICD-10-CM

## 2021-02-02 DIAGNOSIS — Z00.00 ENCOUNTER FOR MEDICARE ANNUAL WELLNESS EXAM: ICD-10-CM

## 2021-02-02 DIAGNOSIS — D68.51 FACTOR V LEIDEN MUTATION (HCC): Primary | ICD-10-CM

## 2021-02-02 LAB
ALBUMIN SERPL-MCNC: 4.1 G/DL (ref 3.5–5)
ALBUMIN/GLOB SERPL: 1.5 {RATIO} (ref 1.1–2.2)
ALP SERPL-CCNC: 89 U/L (ref 45–117)
ALT SERPL-CCNC: 20 U/L (ref 12–78)
ANION GAP SERPL CALC-SCNC: 3 MMOL/L (ref 5–15)
AST SERPL-CCNC: 19 U/L (ref 15–37)
BASOPHILS # BLD: 0 K/UL (ref 0–0.1)
BASOPHILS NFR BLD: 1 % (ref 0–1)
BILIRUB SERPL-MCNC: 0.4 MG/DL (ref 0.2–1)
BUN SERPL-MCNC: 23 MG/DL (ref 6–20)
BUN/CREAT SERPL: 33 (ref 12–20)
CALCIUM SERPL-MCNC: 9.3 MG/DL (ref 8.5–10.1)
CHLORIDE SERPL-SCNC: 111 MMOL/L (ref 97–108)
CHOLEST SERPL-MCNC: 186 MG/DL
CO2 SERPL-SCNC: 28 MMOL/L (ref 21–32)
CREAT SERPL-MCNC: 0.7 MG/DL (ref 0.55–1.02)
DIFFERENTIAL METHOD BLD: NORMAL
EOSINOPHIL # BLD: 0.3 K/UL (ref 0–0.4)
EOSINOPHIL NFR BLD: 4 % (ref 0–7)
ERYTHROCYTE [DISTWIDTH] IN BLOOD BY AUTOMATED COUNT: 13.4 % (ref 11.5–14.5)
GLOBULIN SER CALC-MCNC: 2.7 G/DL (ref 2–4)
GLUCOSE SERPL-MCNC: 87 MG/DL (ref 65–100)
HCT VFR BLD AUTO: 44.7 % (ref 35–47)
HDLC SERPL-MCNC: 60 MG/DL
HDLC SERPL: 3.1 {RATIO} (ref 0–5)
HGB BLD-MCNC: 14.6 G/DL (ref 11.5–16)
IMM GRANULOCYTES # BLD AUTO: 0 K/UL (ref 0–0.04)
IMM GRANULOCYTES NFR BLD AUTO: 0 % (ref 0–0.5)
LDLC SERPL CALC-MCNC: 109.6 MG/DL (ref 0–100)
LIPID PROFILE,FLP: ABNORMAL
LYMPHOCYTES # BLD: 2.1 K/UL (ref 0.8–3.5)
LYMPHOCYTES NFR BLD: 35 % (ref 12–49)
MCH RBC QN AUTO: 31.5 PG (ref 26–34)
MCHC RBC AUTO-ENTMCNC: 32.7 G/DL (ref 30–36.5)
MCV RBC AUTO: 96.3 FL (ref 80–99)
MONOCYTES # BLD: 0.5 K/UL (ref 0–1)
MONOCYTES NFR BLD: 9 % (ref 5–13)
NEUTS SEG # BLD: 3.1 K/UL (ref 1.8–8)
NEUTS SEG NFR BLD: 51 % (ref 32–75)
NRBC # BLD: 0 K/UL (ref 0–0.01)
NRBC BLD-RTO: 0 PER 100 WBC
PLATELET # BLD AUTO: 204 K/UL (ref 150–400)
PMV BLD AUTO: 12.4 FL (ref 8.9–12.9)
POTASSIUM SERPL-SCNC: 4.3 MMOL/L (ref 3.5–5.1)
PROT SERPL-MCNC: 6.8 G/DL (ref 6.4–8.2)
RBC # BLD AUTO: 4.64 M/UL (ref 3.8–5.2)
SODIUM SERPL-SCNC: 142 MMOL/L (ref 136–145)
TRIGL SERPL-MCNC: 82 MG/DL (ref ?–150)
VLDLC SERPL CALC-MCNC: 16.4 MG/DL
WBC # BLD AUTO: 6 K/UL (ref 3.6–11)

## 2021-02-02 PROCEDURE — G8427 DOCREV CUR MEDS BY ELIG CLIN: HCPCS | Performed by: FAMILY MEDICINE

## 2021-02-02 PROCEDURE — 1090F PRES/ABSN URINE INCON ASSESS: CPT | Performed by: FAMILY MEDICINE

## 2021-02-02 PROCEDURE — G8752 SYS BP LESS 140: HCPCS | Performed by: FAMILY MEDICINE

## 2021-02-02 PROCEDURE — 1101F PT FALLS ASSESS-DOCD LE1/YR: CPT | Performed by: FAMILY MEDICINE

## 2021-02-02 PROCEDURE — G8417 CALC BMI ABV UP PARAM F/U: HCPCS | Performed by: FAMILY MEDICINE

## 2021-02-02 PROCEDURE — 99214 OFFICE O/P EST MOD 30 MIN: CPT | Performed by: FAMILY MEDICINE

## 2021-02-02 PROCEDURE — G9899 SCRN MAM PERF RSLTS DOC: HCPCS | Performed by: FAMILY MEDICINE

## 2021-02-02 PROCEDURE — G8754 DIAS BP LESS 90: HCPCS | Performed by: FAMILY MEDICINE

## 2021-02-02 PROCEDURE — G8399 PT W/DXA RESULTS DOCUMENT: HCPCS | Performed by: FAMILY MEDICINE

## 2021-02-02 PROCEDURE — G0463 HOSPITAL OUTPT CLINIC VISIT: HCPCS | Performed by: FAMILY MEDICINE

## 2021-02-02 PROCEDURE — G8536 NO DOC ELDER MAL SCRN: HCPCS | Performed by: FAMILY MEDICINE

## 2021-02-02 PROCEDURE — G8510 SCR DEP NEG, NO PLAN REQD: HCPCS | Performed by: FAMILY MEDICINE

## 2021-02-02 PROCEDURE — G0439 PPPS, SUBSEQ VISIT: HCPCS | Performed by: FAMILY MEDICINE

## 2021-02-02 PROCEDURE — 3017F COLORECTAL CA SCREEN DOC REV: CPT | Performed by: FAMILY MEDICINE

## 2021-02-02 RX ORDER — ZOSTER VACCINE RECOMBINANT, ADJUVANTED 50 MCG/0.5
0.5 KIT INTRAMUSCULAR ONCE
Qty: 0.5 ML | Refills: 1 | Status: SHIPPED | OUTPATIENT
Start: 2021-02-02 | End: 2021-02-02

## 2021-02-02 NOTE — PROGRESS NOTES
This is the Subsequent Medicare Annual Wellness Exam, performed 12 months or more after the Initial AWV or the last Subsequent AWV    I have reviewed the patient's medical history in detail and updated the computerized patient record. Depression Risk Factor Screening:     3 most recent PHQ Screens 2/2/2021   PHQ Not Done -   Little interest or pleasure in doing things Not at all   Feeling down, depressed, irritable, or hopeless Not at all   Total Score PHQ 2 0       Alcohol Risk Screen    Do you average more than 1 drink per night or more than 7 drinks a week:  No    On any one occasion in the past three months have you have had more than 3 drinks containing alcohol:  No        Functional Ability and Level of Safety:    Hearing: Hearing is good. Activities of Daily Living: The home contains: no safety equipment. Patient does total self care      Ambulation: with no difficulty     Fall Risk:  Fall Risk Assessment, last 12 mths 2/2/2021   Able to walk? Yes   Fall in past 12 months? 0   Do you feel unsteady? 0   Are you worried about falling 0   Number of falls in past 12 months -   Fall with injury? -      Abuse Screen:  Patient is not abused       Cognitive Screening    Has your family/caregiver stated any concerns about your memory: no         Assessment/Plan   Education and counseling provided:  Are appropriate based on today's review and evaluation    Diagnoses and all orders for this visit:    1. Lipid screening    2. Severe obesity ([address]  Assessment & Plan:  Improving, based on history, physical exam and review of pertinent labs, studies and medications; meds reconciled; changes to treatment plan as per orders. 3. Factor V Leiden mutation [facility])  Assessment & Plan:  Stable, based on history, physical exam and review of pertinent labs, studies and medications; meds reconciled; continue current treatment plan.       4. Encounter for Medicare annual wellness exam        Health Maintenance Due Health Maintenance Due   Topic Date Due    COVID-19 Vaccine (1 of 2) 1966    GLAUCOMA SCREENING Q2Y  2019    Shingrix Vaccine Age 50> (2 of 2) 2020    Medicare Yearly Exam  2020       Patient Care Team   Patient Care Team:  Yolanda Avalos MD as PCP - General (Family Medicine)  Yolanda Avalos MD as PCP [facility]  Bello Medeiros MD as Physician (Obstetrics & Gynecology)    History     Patient Active Problem List   Diagnosis Code    HTN (hypertension) I10    T. I.A.  DJD (Degenerative Joint Disease) of Knees M17.10    Postmenopausal Z78.0    Plantar fasciitis M72.2    Factor V Leiden mutation ([address] D68.51    AR (allergic rhinitis) J30.9    Stasis, venous I87.8    Venous stasis dermatitis I87.2    Chronic Venous Stasis BLE I87.8    Osteopenia M85.80    Mild intermittent asthma without complication H69.20    ACP (advance care planning) Z71.89    Cortical age-related cataract of left eye H25.012    Cortical age-related cataract of right eye H25.011    PVD (posterior vitreous detachment), left eye H43.812    Severe obesity (HCC) E66.01     Past Medical History:   Diagnosis Date    AR (allergic rhinitis) 2010    Chronic Venous Stasis BLE 10/14/2010    DJD (degenerative joint disease) of knee 2010    Factor V Leiden mutation ([address] 2010    HTN (hypertension) 2010    Plantar fasciitis 2010    Postmenopausal 2010    Stasis, venous 2010    Stroke ([address]     T. I.A. 2010    Venous stasis dermatitis 10/14/2010      Past Surgical History:   Procedure Laterality Date    HX BACK SURGERY      lumbar disc    HX  SECTION      HX HYSTERECTOMY  2019    total     Current Outpatient Medications   Medication Sig Dispense Refill    montelukast (SINGULAIR) 10 mg tablet Take 1 tab po daily prn 90 Tab 1    acetaminophen (TYLENOL) 325 mg tablet Take  by mouth every four (4) hours as needed for Pain.      albuterol (PROAIR HFA) 90 mcg/actuation inhaler Take 2 Puffs by inhalation every six (6) hours as needed for Wheezing or Shortness of Breath. 1 Inhaler 5    furosemide (LASIX) 20 mg tablet Take 1-2 Tabs by mouth daily. 60 Tab 0    albuterol (PROVENTIL VENTOLIN) 2.5 mg /3 mL (0.083 %) nebulizer solution 3 mL by Nebulization route every four (4) hours as needed for Wheezing. 72 Each 1    aspirin delayed-release 81 mg tablet Take 81 mg by mouth daily. Allergies   Allergen Reactions    Sulfasalazine Hives    Bactrim [Sulfamethoprim Ds] Hives    Clindamycin Hives       Family History   Problem Relation Age of Onset   24 [facility] Asthma Sister     Asthma Brother     MS Daughter     Allergic Rhinitis Son     Thyroid Disease Daughter      Social History     Tobacco Use    Smoking status: Never Smoker    Smokeless tobacco: Never Used   Substance Use Topics    Alcohol use:  No

## 2021-02-02 NOTE — PATIENT INSTRUCTIONS
Body Mass Index: Care Instructions Your Care Instructions Body mass index (BMI) can help you see if your weight is raising your risk for health problems. It uses a formula to compare how much you weigh with how tall you are. · A BMI lower than 18.5 is considered underweight. · A BMI between 18.5 and 24.9 is considered healthy. · A BMI between 25 and 29.9 is considered overweight. A BMI of 30 or higher is considered obese. If your BMI is in the normal range, it means that you have a lower risk for weight-related health problems. If your BMI is in the overweight or obese range, you may be at increased risk for weight-related health problems, such as high blood pressure, heart disease, stroke, arthritis or joint pain, and diabetes. If your BMI is in the underweight range, you may be at increased risk for health problems such as fatigue, lower protection (immunity) against illness, muscle loss, bone loss, hair loss, and hormone problems. BMI is just one measure of your risk for weight-related health problems. You may be at higher risk for health problems if you are not active, you eat an unhealthy diet, or you drink too much alcohol or use tobacco products. Follow-up care is a key part of your treatment and safety. Be sure to make and go to all appointments, and call your doctor if you are having problems. It's also a good idea to know your test results and keep a list of the medicines you take. How can you care for yourself at home? · Practice healthy eating habits. This includes eating plenty of fruits, vegetables, whole grains, lean protein, and low-fat dairy. · If your doctor recommends it, get more exercise. Walking is a good choice. Bit by bit, increase the amount you walk every day. Try for at least 30 minutes on most days of the week. · Do not smoke. Smoking can increase your risk for health problems. If you need help quitting, talk to your doctor about stop-smoking programs and medicines. These can increase your chances of quitting for good. · Limit alcohol to 2 drinks a day for men and 1 drink a day for women. Too much alcohol can cause health problems. If you have a BMI higher than 25 · Your doctor may do other tests to check your risk for weight-related health problems. This may include measuring the distance around your waist. A waist measurement of more than 40 inches in men or 35 inches in women can increase the risk of weight-related health problems. · Talk with your doctor about steps you can take to stay healthy or improve your health. You may need to make lifestyle changes to lose weight and stay healthy, such as changing your diet and getting regular exercise. If you have a BMI lower than 18.5 · Your doctor may do other tests to check your risk for health problems. · Talk with your doctor about steps you can take to stay healthy or improve your health. You may need to make lifestyle changes to gain or maintain weight and stay healthy, such as getting more healthy foods in your diet and doing exercises to build muscle. Where can you learn more? Go to http://www.jhaveri.com/ Enter S176 in the search box to learn more about \"Body Mass Index: Care Instructions. \" Current as of: December 11, 2019               Content Version: 12.6 © 3770-9382 [employer]. Care instructions adapted under license by [employer] (which disclaims liability or warranty for this information). If you have questions about a medical condition or this instruction, always ask your healthcare professional. [address] any warranty or liability for your use of this information.

## 2021-02-02 NOTE — ASSESSMENT & PLAN NOTE
Improving, based on history, physical exam and review of pertinent labs, studies and medications; meds reconciled; changes to treatment plan as per orders.

## 2021-02-02 NOTE — PROGRESS NOTES
HPI  Mihir Castillo 79 y.o. female  presents to the office today for hypertension. Blood pressure 120/77, pulse 78, temperature 98 °F (36.7 °C), temperature source Oral, resp. rate 18, height 5' 1\" (1.549 m), weight 188 lb 3.2 oz (85.4 kg), last menstrual period 03/02/2001, SpO2 100 %. Body mass index is 35.56 kg/m². Chief Complaint   Patient presents with    Hypertension     6 month f/u         Asthma: Stable. Pt states she has not used an inhaler in the past two years as her condition as been doing well. Hypertension: BP at office today 120/77. Pt continues with Lasix 20-40 mg/day. Obesity: I have reviewed/discussed the above normal BMI with the patient. Factor 5 Leiden mutation: Stable. I advised pt to inform her daughters they should be tested for this condition. Health maintenance:  Medicare questionnaire discussed and responses reviewed today in office. Pt will have updated lab work performed during today's OV. Pt received her eye exam recently; she informs me she will have cataract surgery in June 2021 as she has noticed issues with night driving. Pt has not received her Shingrix vaccinations due to the cost. Provided pt with prescription and will follow up with local pharmacy for Shingrix vaccine. Pt is up to date with her pneumonia vaccinations. Current Outpatient Medications   Medication Sig Dispense Refill    varicella-zoster recombinant, PF, (Shingrix, PF,) 50 mcg/0.5 mL susr injection 0.5 mL by IntraMUSCular route once for 1 dose. 0.5 mL 1    montelukast (SINGULAIR) 10 mg tablet Take 1 tab po daily prn 90 Tab 1    acetaminophen (TYLENOL) 325 mg tablet Take  by mouth every four (4) hours as needed for Pain.  albuterol (PROAIR HFA) 90 mcg/actuation inhaler Take 2 Puffs by inhalation every six (6) hours as needed for Wheezing or Shortness of Breath. 1 Inhaler 5    furosemide (LASIX) 20 mg tablet Take 1-2 Tabs by mouth daily.  60 Tab 0    albuterol (PROVENTIL VENTOLIN) 2.5 mg /3 mL (0.083 %) nebulizer solution 3 mL by Nebulization route every four (4) hours as needed for Wheezing. 72 Each 1    aspirin delayed-release 81 mg tablet Take 81 mg by mouth daily. Allergies   Allergen Reactions    Sulfasalazine Hives    Bactrim [Sulfamethoprim Ds] Hives    Clindamycin Hives     Past Medical History:   Diagnosis Date    AR (allergic rhinitis) 2010    Chronic Venous Stasis BLE 10/14/2010    DJD (degenerative joint disease) of knee 2010    Factor V Leiden mutation ([facility] 2010    HTN (hypertension) 2010    Plantar fasciitis 2010    Postmenopausal 2010    Stasis, venous 2010    Stroke ([facility]     T. I.A. 2010    Venous stasis dermatitis 10/14/2010     Past Surgical History:   Procedure Laterality Date    HX BACK SURGERY      lumbar disc    HX  SECTION      HX HYSTERECTOMY  2019    total     Family History   Problem Relation Age of Onset    Asthma Sister     Asthma Brother     MS Daughter     Allergic Rhinitis Son     Thyroid Disease Daughter      Social History     Tobacco Use    Smoking status: Never Smoker    Smokeless tobacco: Never Used   Substance Use Topics    Alcohol use: No        Review of Systems   Constitutional: Negative for chills and fever. HENT: Negative for hearing loss and tinnitus. Eyes: Negative for blurred vision and double vision. Respiratory: Negative for cough and shortness of breath. Cardiovascular: Negative for chest pain and palpitations. Gastrointestinal: Negative for nausea and vomiting. Genitourinary: Negative for dysuria and frequency. Musculoskeletal: Negative for back pain and falls. Skin: Negative for itching and rash. Neurological: Negative for dizziness, loss of consciousness and headaches. Endo/Heme/Allergies: Negative. Psychiatric/Behavioral: Negative for depression. The patient is not nervous/anxious.         Physical Exam  Constitutional: Appearance: Normal appearance. HENT:      Head: Normocephalic and atraumatic. Right Ear: Tympanic membrane, ear canal and external ear normal.      Left Ear: Tympanic membrane, ear canal and external ear normal.      Nose: Nose normal.      Mouth/Throat:      Mouth: Mucous membranes are moist.      Pharynx: Oropharynx is clear. Eyes:      Extraocular Movements: Extraocular movements intact. Conjunctiva/sclera: Conjunctivae normal.      Pupils: Pupils are equal, round, and reactive to light. Cardiovascular:      Rate and Rhythm: Normal rate and regular rhythm. Pulses: Normal pulses. Heart sounds: Normal heart sounds. Pulmonary:      Effort: Pulmonary effort is normal.      Breath sounds: Normal breath sounds. Abdominal:      General: Abdomen is flat. Bowel sounds are normal.      Palpations: Abdomen is soft. Genitourinary:     General: Normal vulva. Rectum: Normal.   Musculoskeletal: Normal range of motion. Skin:     General: Skin is warm and dry. Neurological:      General: No focal deficit present. Mental Status: She is alert and oriented to person, place, and time. Mental status is at baseline. Psychiatric:         Mood and Affect: Mood normal.         Behavior: Behavior normal.         Judgment: Judgment normal.           ASSESSMENT and PLAN  Diagnoses and all orders for this visit:    1. Factor V Leiden mutation ([facility]  Stable. I advised pt to inform her daughters they should be tested for this condition. Assessment & Plan:  Stable, based on history, physical exam and review of pertinent labs, studies and medications; meds reconciled; continue current treatment plan. Orders:  -     CBC WITH AUTOMATED DIFF; Future    2. Severe obesity ([facility] [address]   I have recommended the following interventions: dietary management education, guidance, and counseling, encourage exercise and monitor weight .    Assessment & Plan:  Improving, based on history, physical exam and review of pertinent labs, studies and medications; meds reconciled; changes to treatment plan as per orders. Orders:  -     LIPID PANEL; Future    3. Lipid screening  Pt will have updated lab work performed during today's OV.   -     METABOLIC PANEL, COMPREHENSIVE; Future  -     LIPID PANEL; Future    4. Encounter for Medicare annual wellness exam  Medicare questionnaire discussed and responses reviewed today in office. 5. Encounter for immunization  Provided pt with prescription and will follow up with local pharmacy for Shingrix vaccine. -     varicella-zoster recombinant, PF, (Shingrix, PF,) 50 mcg/0.5 mL susr injection; 0.5 mL by IntraMUSCular route once for 1 dose. Medication risks/benefits/costs/interactions/alternatives discussed with patient. Advised patient to call back or return to office if symptoms worsen/change/persist.  If patient cannot reach us or should anything more severe/urgent arise he/she should proceed directly to the nearest emergency department. Discussed expected course/resolution/complications of diagnosis in detail with patient. Patient given a written after visit summary which includes diagnoses, current medications and vitals. Patient expressed understanding with the diagnosis and plan. Written by elvira Lawrence, as dictated by Dunia Ulloa M.D.    2:01 PM - 2:23 PM    Total time spent with the patient 22 minutes, greater than 50% of time spent counseling patient.

## 2021-02-02 NOTE — PROGRESS NOTES
Chief Complaint   Patient presents with    Hypertension     6 month f/u      1. Have you been to the ER, urgent care clinic since your last visit? Hospitalized since your last visit? No    2. Have you seen or consulted any other health care providers outside of the [address] [place] [address] since your last visit? Include any pap smears or colon screening.  No

## 2021-02-21 NOTE — PROGRESS NOTES
Inform pt to go to my chart to see results and recommendations    Mrs. George Hdez,  Your labs overall look good cholesterol panel looks much improved continue to the good work and I will see you again as advised. If you have any questions please let me know.

## 2021-03-17 NOTE — MR AVS SNAPSHOT
[facility] - Pediatric Orthopedics   [address]  [place] [address]  Phone: [phone]  Fax: [phone]              Kevin Duvall  2006    Encounter Date: 3/17/2021   It was my pleasure to see your patient today in consultation.  I have enclosed a copy of my note for your review and if you have any questions please feel free to contact me on my cell phone at [phone] or email me at [email or web address].      Rudolph Garcia M.D.          PROGRESS NOTE:  History: Patient is a 14-year-old who is been following for scoliosis at his last visit we saw slight progression and so is here today now for follow-up we also follow him closely for his limb length discrepancy which is 1.5 cm.  He is otherwise been doing well he states he been having no problems he is an avid swimmer and on the swim team.  He denies any numbness tingling or weakness no bowel or bladder signs    Socially he lives here in [facility]    Review of Systems   Constitutional: Negative for diaphoresis, fever, malaise/fatigue and weight loss.   HENT: Negative for congestion.    Eyes: Negative for photophobia, discharge and redness.   Respiratory: Negative for cough, wheezing and stridor.    Cardiovascular: Negative for leg swelling.   Gastrointestinal: Negative for constipation, diarrhea, nausea and vomiting.   Genitourinary:        No renal disease or abnormalities   Musculoskeletal: Negative for back pain, joint pain and neck pain.   Skin: Negative for rash.   Neurological: Negative for tremors, sensory change, speech change, focal weakness, seizures, loss of consciousness and weakness.   Endo/Heme/Allergies: Does not bruise/bleed easily.      has no past medical history on file.    No past surgical history on file.  family history is not on file.    Patient has no known allergies.    currently has no medications in their medication list.    Pulse 76   Temp 36.7 °C (98 °F) (Temporal)   Ht 1.803 m (5'  Visit Information Date & Time Provider Department Dept. Phone Encounter #  
 5/31/2017  7:00 PM Maisha Gamboa MD [facility] [phone] [id number] Follow-up Instructions Return if symptoms worsen or fail to improve. Your Appointments 8/14/2017  3:15 PM  
COMPLETE PHYSICAL with Maisha Gamboa MD  
[facility]) Appt Note: CPE  
 [address] [phone]  
[phone]  
  
   
 [address] [address] Upcoming Health Maintenance Date Due DTaP/Tdap/Td series (1 - Tdap) 2/11/2004 ZOSTER VACCINE AGE 60> 4/8/2010 GLAUCOMA SCREENING Q2Y 4/8/2015 Pneumococcal 65+ Low/Medium Risk (1 of 2 - PCV13) 4/8/2015 MEDICARE YEARLY EXAM 4/8/2015 INFLUENZA AGE 9 TO ADULT 8/1/2017 BREAST CANCER SCRN MAMMOGRAM 3/21/2019 COLONOSCOPY 7/8/2021 Allergies as of 5/31/2017  Review Complete On: 4/12/2017 By: Maisha Gamboa MD  
  
 Severity Noted Reaction Type Reactions Bactrim [Sulfamethoprim Ds]  03/30/2012    Hives Clindamycin  03/30/2012   Side Effect Hives Current Immunizations  Reviewed on 3/11/2017 Name Date Influenza Vaccine 11/5/2016, 12/3/2014 Td 2/10/2004 Not reviewed this visit You Were Diagnosed With   
  
 Codes Comments Acute pain of right knee    -  Primary ICD-10-CM: M25.561 ICD-9-CM: 719.46 Swelling of right knee joint     ICD-10-CM: M25.461 ICD-9-CM: 719.06 Vitals BP Pulse Temp Resp Height(growth percentile) Weight(growth percentile) 138/88 76 97.9 °F (36.6 °C) (Oral) 16 5' 1\" (1.549 m) 248 lb (112.5 kg) LMP SpO2 BMI OB Status Smoking Status 03/02/2001 96% 46.86 kg/m2 Postmenopausal Never Smoker Vitals History BMI and BSA Data Body Mass Index Body Surface Area  
 46.86 kg/m 2 2.2 m 2 Preferred Pharmacy Pharmacy Name Phone "11\")   Wt 55.5 kg (122 lb 4 oz)   SpO2 96%     Physical Exam:     Patient has a normal gait and appropriate for their age.  Healthy-appearing in no acute distress  Weight appropriate for age and size  Affect is appropriate for situation   Head: asymmetry of the jaw.    Eyes: extra-ocular movements intact   Nose: No discharge is noted no other abnormalities   Throat: No difficulty swallowing no erythema otherwise normal line   Neck: Supple and non-tender   Lungs: non-labored breathing, no retractions   Cardio: cap refill <2sec, equal pulses bilaterally  Skin: Intact, no rashes, no breakdown     They have good toe walking and heel walking and a good normal tandem gait.  Their motor strength is 5 over 5 throughout in all motor groups.  Their sensation is intact to light touch and they have no spasticity or clonus noted.  They have a negative straight leg raise on the right and on the left.  Reflexes are 2 and symmetric bilateral in patella and achilles    On standing their pelvis i right higher than left, their leg lengths are not equal, and the spine is slightly trunk shifted  The waist is asymmetric.  The shoulders are level. They have no skin lesions.  On sitting Wang test the scoliosis goes away when we relieve all leg length inequality is  On forward bend: They have a  left lumbar prominence.  Mild kyphotic posture    X-rays on my review of his limb length show him to have his right greater than his left now by 2.1 cm his bone age is 14-1/2.  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  Since it has " [facility] [phone] [facility] [phone] Your Updated Medication List  
  
   
This list is accurate as of: 5/31/17  8:22 PM.  Always use your most recent med list.  
  
  
  
  
 * albuterol 90 mcg/actuation inhaler Commonly known as:  PROAIR HFA Take 2 Puffs by inhalation every six (6) hours as needed for Wheezing or Shortness of Breath. * albuterol 2.5 mg /3 mL (0.083 %) nebulizer solution Commonly known as:  PROVENTIL VENTOLIN  
3 mL by Nebulization route every four (4) hours as needed for Wheezing. aspirin delayed-release 81 mg tablet Take 81 mg by mouth daily. fluticasone-salmeterol 250-50 mcg/dose diskus inhaler Commonly known as:  ADVAIR Take 1 Puff by inhalation every twelve (12) hours. furosemide 20 mg tablet Commonly known as:  LASIX Take 1 Tab by mouth daily. montelukast 10 mg tablet Commonly known as:  SINGULAIR  
TAKE ONE TABLET BY MOUTH DAILY FOR ALLERGIES AND ASTHMA  
  
 SYMBICORT 160-4.5 mcg/actuation HFA inhaler Generic drug:  budesonide-formoterol Take 2 Puffs by inhalation two (2) times a day. traMADol 50 mg tablet Commonly known as:  ULTRAM  
Take 1 Tab by mouth nightly as needed for Pain. Max Daily Amount: 50 mg. Indications: Pain TYLENOL 325 mg tablet Generic drug:  acetaminophen Take  by mouth every four (4) hours as needed for Pain. * Notice: This list has 2 medication(s) that are the same as other medications prescribed for you. Read the directions carefully, and ask your doctor or other care provider to review them with you. Prescriptions Printed Refills  
 traMADol (ULTRAM) 50 mg tablet 0 Sig: Take 1 Tab by mouth nightly as needed for Pain. Max Daily Amount: 50 mg. Indications: Pain Class: Print Route: Oral  
  
Follow-up Instructions Return if symptoms worsen or fail to improve. To-Do List   
 05/31/2017 Imaging:  XR KNEE RT MIN 4 V   
  
 05/31/2017 Imaging:  XR KNEE RT MIN 4 V Introducing [facility]! Dear Briseyda Enter: Thank you for requesting a Wish[employer] account. Our records indicate that you already have an active WishLink account. You can access your account anytime at [email or web address] Did you know that you can access your hospital and ER discharge instructions at any time in WishLink? You can also review all of your test results from your hospital stay or ER visit. Additional Information If you have questions, please visit the Frequently Asked Questions section of the WishLink website at [email or web address] Remember, WishLink is NOT to be used for urgent needs. For medical emergencies, dial 911. Now available from your iPhone and Android! Please provide this summary of care documentation to your next provider. Your primary care clinician is listed as Torri Regalado. If you have any questions after today's visit, please call [phone]. progressed over the last 6 to 8 months and is now 2.1 cm we discussed various treatment options I discussed with her the potential for just a shoe lift but he would have to use this forever versus a epiphysiodesis which is outpatient surgery and would allow the short leg to catch up by stopping one of the growth plates in the long leg.  We discussed various other treatment options such as limb lengthening which I would not recommend or waiting till he is an adult and doing a femoral shortening if needed.  We discussed how that this is actually time sensitive as he is already 14-1/2 years by his bone age and boys typically stopped growing at 16 I therefore recommended that we would do his right distal femur paracentesis with the next several months.  His mother understands and agrees should like to discuss it with his father and then will contact me if they would like to schedule surgery otherwise we need to recheck him in 6 months with a repeat bone length study and bone age.      Rudolph Garcia MD  Director Pediatric Orthopedics and Scoliosis                  Michael Muhammad M.D.  [address]  [address]  [place] [address]  Via Fax: [phone]

## 2021-08-03 ENCOUNTER — OFFICE VISIT (OUTPATIENT)
Dept: FAMILY MEDICINE CLINIC | Age: 71
End: 2021-08-03
Payer: MEDICARE

## 2021-08-03 VITALS
WEIGHT: 186 LBS | BODY MASS INDEX: 35.12 KG/M2 | HEIGHT: 61 IN | DIASTOLIC BLOOD PRESSURE: 80 MMHG | TEMPERATURE: 96.4 F | HEART RATE: 70 BPM | SYSTOLIC BLOOD PRESSURE: 128 MMHG | OXYGEN SATURATION: 98 % | RESPIRATION RATE: 16 BRPM

## 2021-08-03 DIAGNOSIS — Z12.11 COLON CANCER SCREENING: ICD-10-CM

## 2021-08-03 DIAGNOSIS — J45.41 MODERATE PERSISTENT ASTHMA WITH ACUTE EXACERBATION: Primary | ICD-10-CM

## 2021-08-03 DIAGNOSIS — Z71.84 TRAVEL ADVICE ENCOUNTER: ICD-10-CM

## 2021-08-03 DIAGNOSIS — Z23 ENCOUNTER FOR IMMUNIZATION: ICD-10-CM

## 2021-08-03 PROCEDURE — G8536 NO DOC ELDER MAL SCRN: HCPCS | Performed by: FAMILY MEDICINE

## 2021-08-03 PROCEDURE — G8754 DIAS BP LESS 90: HCPCS | Performed by: FAMILY MEDICINE

## 2021-08-03 PROCEDURE — G8427 DOCREV CUR MEDS BY ELIG CLIN: HCPCS | Performed by: FAMILY MEDICINE

## 2021-08-03 PROCEDURE — G8510 SCR DEP NEG, NO PLAN REQD: HCPCS | Performed by: FAMILY MEDICINE

## 2021-08-03 PROCEDURE — 99214 OFFICE O/P EST MOD 30 MIN: CPT | Performed by: FAMILY MEDICINE

## 2021-08-03 PROCEDURE — 1090F PRES/ABSN URINE INCON ASSESS: CPT | Performed by: FAMILY MEDICINE

## 2021-08-03 PROCEDURE — G8399 PT W/DXA RESULTS DOCUMENT: HCPCS | Performed by: FAMILY MEDICINE

## 2021-08-03 PROCEDURE — G8417 CALC BMI ABV UP PARAM F/U: HCPCS | Performed by: FAMILY MEDICINE

## 2021-08-03 PROCEDURE — G8752 SYS BP LESS 140: HCPCS | Performed by: FAMILY MEDICINE

## 2021-08-03 PROCEDURE — G9899 SCRN MAM PERF RSLTS DOC: HCPCS | Performed by: FAMILY MEDICINE

## 2021-08-03 PROCEDURE — G0463 HOSPITAL OUTPT CLINIC VISIT: HCPCS | Performed by: FAMILY MEDICINE

## 2021-08-03 PROCEDURE — 3017F COLORECTAL CA SCREEN DOC REV: CPT | Performed by: FAMILY MEDICINE

## 2021-08-03 PROCEDURE — 1101F PT FALLS ASSESS-DOCD LE1/YR: CPT | Performed by: FAMILY MEDICINE

## 2021-08-03 RX ORDER — AZITHROMYCIN 250 MG/1
TABLET, FILM COATED ORAL
Qty: 6 TABLET | Refills: 0 | Status: SHIPPED | OUTPATIENT
Start: 2021-08-03 | End: 2021-08-08

## 2021-08-03 RX ORDER — METHYLPREDNISOLONE 4 MG/1
TABLET ORAL
Qty: 1 DOSE PACK | Refills: 0 | Status: SHIPPED | OUTPATIENT
Start: 2021-08-03 | End: 2022-02-01 | Stop reason: ALTCHOICE

## 2021-08-03 RX ORDER — MONTELUKAST SODIUM 10 MG/1
TABLET ORAL
Qty: 90 TABLET | Refills: 1 | Status: SHIPPED | OUTPATIENT
Start: 2021-08-03

## 2021-08-03 RX ORDER — ZOSTER VACCINE RECOMBINANT, ADJUVANTED 50 MCG/0.5
0.5 KIT INTRAMUSCULAR ONCE
Qty: 0.5 ML | Refills: 1 | Status: SHIPPED | OUTPATIENT
Start: 2021-08-03 | End: 2021-08-03

## 2021-08-03 NOTE — PROGRESS NOTES
CHAPIN Gaviria 70 y.o. female  presents to the office today for hypertension    Blood pressure 128/80, pulse 70, temperature (!) 96.4 °F (35.8 °C), temperature source Temporal, resp. rate 16, height 5' 1\" (1.549 m), weight 186 lb (84.4 kg), last menstrual period 03/02/2001, SpO2 98 %. Body mass index is 35.14 kg/m². Chief Complaint   Patient presents with    Hypertension      Hypertension: BP at office today 140/66 and 128/80 on manual recheck. Pt is not currently on any antihypertensive medications. Asthma: Pt has a long-standing h/o asthma. She continues with Singulair 10mg daily and her Albuterol inhalers. She denies having any SOB or wheezing. Pt requests a refill of their medication, which I have granted. Travel Advice Encounter: Pt states that she is leaving [place]) in 1 week and plans to be there for ~1 month. She is requesting a prescription for a Z-magdaleon and Medrol Dosepak to have preemptively for any possible infections that she may encounter while abroad. Health Maintenance: Pt endorses receiving her COVID-19 vaccination series in March 2021. Pt had a normal screening colonoscopy ~ 10 years ago. After discussing the matter with the pt, I have referred her to Dr. Serena Davis, Gastroenterology, today for further evaluation and a repeat screening colonoscopy. Pt has never completed the Shingrix vaccination series. Provided pt with prescription and will follow up with local pharmacy for Shingrix vaccine. Current Outpatient Medications   Medication Sig Dispense Refill    azithromycin (ZITHROMAX) 250 mg tablet Take 2 tablets today, then take 1 tablet daily 6 Tablet 0    methylPREDNISolone (MEDROL DOSEPACK) 4 mg tablet Take as directed 1 Dose Pack 0    varicella-zoster recombinant, PF, (Shingrix, PF,) 50 mcg/0.5 mL susr injection 0.5 mL by IntraMUSCular route once for 1 dose.  0.5 mL 1    montelukast (SINGULAIR) 10 mg tablet Take 1 tab po daily prn 90 Tablet 1    acetaminophen (TYLENOL) 325 mg tablet Take  by mouth every four (4) hours as needed for Pain. Allergies   Allergen Reactions    Sulfasalazine Hives    Bactrim [Sulfamethoprim Ds] Hives    Clindamycin Hives     Past Medical History:   Diagnosis Date    AR (allergic rhinitis) 2010    Chronic Venous Stasis BLE 10/14/2010    DJD (degenerative joint disease) of knee 2010    Factor V Leiden mutation ([facility] [address] 2010    HTN (hypertension) 2010    Plantar fasciitis 2010    Postmenopausal 2010    Stasis, venous 2010    Stroke ([facility] [address]     [place] 2010    Venous stasis dermatitis 10/14/2010     Past Surgical History:   Procedure Laterality Date    HX BACK SURGERY      lumbar disc    HX  SECTION      HX HYSTERECTOMY  2019    total     Family History   Problem Relation Age of Onset    Asthma Sister     Asthma Brother     MS Daughter     Allergic Rhinitis Son     Thyroid Disease Daughter      Social History     Tobacco Use    Smoking status: Never Smoker    Smokeless tobacco: Never Used   Substance Use Topics    Alcohol use: No        Review of Systems   Constitutional: Negative for chills and fever. HENT: Negative for hearing loss and tinnitus. Eyes: Negative for blurred vision and double vision. Respiratory: Negative for cough and shortness of breath. Cardiovascular: Negative for chest pain and palpitations. Gastrointestinal: Negative for nausea and vomiting. Genitourinary: Negative for dysuria and frequency. Musculoskeletal: Negative for back pain and falls. Skin: Negative for itching and rash. Neurological: Negative for dizziness, loss of consciousness and headaches. Endo/Heme/Allergies: Negative. Psychiatric/Behavioral: Negative for depression. The patient is not nervous/anxious. Physical Exam  Constitutional:       Appearance: Normal appearance. HENT:      Head: Normocephalic and atraumatic.       Right Ear: Tympanic membrane, ear canal and external ear normal.      Left Ear: Tympanic membrane, ear canal and external ear normal.      Nose: Nose normal.      Mouth/Throat:      Mouth: Mucous membranes are moist.      Pharynx: Oropharynx is clear. Eyes:      Extraocular Movements: Extraocular movements intact. Conjunctiva/sclera: Conjunctivae normal.      Pupils: Pupils are equal, round, and reactive to light. Cardiovascular:      Rate and Rhythm: Normal rate and regular rhythm. Pulses: Normal pulses. Heart sounds: Normal heart sounds. Pulmonary:      Effort: Pulmonary effort is normal.      Breath sounds: Normal breath sounds. Abdominal:      General: Abdomen is flat. Bowel sounds are normal.      Palpations: Abdomen is soft. Genitourinary:     General: Normal vulva. Rectum: Normal.   Musculoskeletal:         General: Normal range of motion. Skin:     General: Skin is warm and dry. Neurological:      General: No focal deficit present. Mental Status: She is alert and oriented to person, place, and time. Mental status is at baseline. Psychiatric:         Mood and Affect: Mood normal.         Behavior: Behavior normal.         Judgment: Judgment normal.           ASSESSMENT and PLAN  Diagnoses and all orders for this visit:    1. Moderate persistent asthma with acute exacerbation  Pt has a long-standing h/o asthma. She continues with Singulair 10mg daily and her Albuterol inhalers. She denies having any SOB or wheezing. Pt requests a refill of their medication, which I have granted. -     montelukast (SINGULAIR) 10 mg tablet; Take 1 tab po daily prn    2. Travel advice encounter  Pt states that she is leaving for [place] in 1 week and plans to be there for ~1 month. She is requesting a prescription for a Z-magdaleno and Medrol Dosepak to have preemptively for any possible infections that she may encounter while abroad. -     azithromycin (ZITHROMAX) 250 mg tablet;  Take 2 tablets today, then take 1 tablet daily  -     methylPREDNISolone (MEDROL DOSEPACK) 4 mg tablet; Take as directed    3. Colon cancer screening  Pt had a normal screening colonoscopy ~ 10 years ago. After discussing the matter with the pt, I have referred her to Dr. Dom Celaya, Gastroenterology, today for further evaluation and a repeat screening colonoscopy. -     REFERRAL TO GASTROENTEROLOGY    4. Encounter for immunization  Pt has never completed the Shingrix vaccination series. Provided pt with prescription and will follow up with local pharmacy for Shingrix vaccine. -     varicella-zoster recombinant, PF, (Shingrix, PF,) 50 mcg/0.5 mL susr injection; 0.5 mL by IntraMUSCular route once for 1 dose. Follow-up and Dispositions    · Return in about 6 months (around 2/3/2022) for follow up. Medication risks/benefits/costs/interactions/alternatives discussed with patient. Advised patient to call back or return to office if symptoms worsen/change/persist.  If patient cannot reach us or should anything more severe/urgent arise he/she should proceed directly to the nearest emergency department. Discussed expected course/resolution/complications of diagnosis in detail with patient. Patient given a written after visit summary which includes diagnoses, current medications and vitals. Patient expressed understanding with the diagnosis and plan. Written by elvira Gauthier, as dictated by Serafin Greenberg M.D.    8:41 AM - 8:52 AM    Total time spent with the patient 11 minutes, greater than 50% of time spent counseling patient.

## 2021-08-03 NOTE — PROGRESS NOTES
Chief Complaint   Patient presents with    Hypertension     1. Have you been to the ER, urgent care clinic since your last visit? Hospitalized since your last visit?no    2. Have you seen or consulted any other health care providers outside of the [address] [place] [address] since your last visit? Include any pap smears or colon screening.  Yes eye exam     Colonoscopy - no

## 2022-02-01 ENCOUNTER — OFFICE VISIT (OUTPATIENT)
Dept: FAMILY MEDICINE CLINIC | Age: 72
End: 2022-02-01
Payer: MEDICARE

## 2022-02-01 VITALS
HEART RATE: 75 BPM | TEMPERATURE: 98.4 F | WEIGHT: 195 LBS | DIASTOLIC BLOOD PRESSURE: 80 MMHG | BODY MASS INDEX: 36.82 KG/M2 | HEIGHT: 61 IN | OXYGEN SATURATION: 99 % | RESPIRATION RATE: 16 BRPM | SYSTOLIC BLOOD PRESSURE: 139 MMHG

## 2022-02-01 DIAGNOSIS — Z13.220 LIPID SCREENING: ICD-10-CM

## 2022-02-01 DIAGNOSIS — Z12.11 COLON CANCER SCREENING: ICD-10-CM

## 2022-02-01 DIAGNOSIS — E66.01 SEVERE OBESITY (BMI 35.0-35.9 WITH COMORBIDITY) (HCC): ICD-10-CM

## 2022-02-01 DIAGNOSIS — Z00.00 ENCOUNTER FOR MEDICARE ANNUAL WELLNESS EXAM: ICD-10-CM

## 2022-02-01 DIAGNOSIS — R53.81 MALAISE: ICD-10-CM

## 2022-02-01 DIAGNOSIS — J45.41 MODERATE PERSISTENT ASTHMA WITH ACUTE EXACERBATION: Primary | ICD-10-CM

## 2022-02-01 DIAGNOSIS — Z12.31 SCREENING MAMMOGRAM, ENCOUNTER FOR: ICD-10-CM

## 2022-02-01 DIAGNOSIS — D68.51 FACTOR V LEIDEN MUTATION (HCC): ICD-10-CM

## 2022-02-01 LAB
ALBUMIN SERPL-MCNC: 4.2 G/DL (ref 3.5–5)
ALBUMIN/GLOB SERPL: 1.2 {RATIO} (ref 1.1–2.2)
ALP SERPL-CCNC: 94 U/L (ref 45–117)
ALT SERPL-CCNC: 27 U/L (ref 12–78)
ANION GAP SERPL CALC-SCNC: 4 MMOL/L (ref 5–15)
APPEARANCE UR: CLEAR
AST SERPL-CCNC: 25 U/L (ref 15–37)
BACTERIA URNS QL MICRO: NEGATIVE /HPF
BILIRUB SERPL-MCNC: 0.4 MG/DL (ref 0.2–1)
BILIRUB UR QL: NEGATIVE
BUN SERPL-MCNC: 22 MG/DL (ref 6–20)
BUN/CREAT SERPL: 32 (ref 12–20)
CALCIUM SERPL-MCNC: 9.5 MG/DL (ref 8.5–10.1)
CHLORIDE SERPL-SCNC: 107 MMOL/L (ref 97–108)
CHOLEST SERPL-MCNC: 218 MG/DL
CO2 SERPL-SCNC: 29 MMOL/L (ref 21–32)
COLOR UR: ABNORMAL
CREAT SERPL-MCNC: 0.68 MG/DL (ref 0.55–1.02)
EPITH CASTS URNS QL MICRO: ABNORMAL /LPF
GLOBULIN SER CALC-MCNC: 3.4 G/DL (ref 2–4)
GLUCOSE SERPL-MCNC: 92 MG/DL (ref 65–100)
GLUCOSE UR STRIP.AUTO-MCNC: NEGATIVE MG/DL
HDLC SERPL-MCNC: 61 MG/DL
HDLC SERPL: 3.6 {RATIO} (ref 0–5)
HGB UR QL STRIP: NEGATIVE
HYALINE CASTS URNS QL MICRO: ABNORMAL /LPF (ref 0–5)
KETONES UR QL STRIP.AUTO: NEGATIVE MG/DL
LDLC SERPL CALC-MCNC: 141.6 MG/DL (ref 0–100)
LEUKOCYTE ESTERASE UR QL STRIP.AUTO: ABNORMAL
NITRITE UR QL STRIP.AUTO: NEGATIVE
PH UR STRIP: 5.5 [PH] (ref 5–8)
POTASSIUM SERPL-SCNC: 4.7 MMOL/L (ref 3.5–5.1)
PROT SERPL-MCNC: 7.6 G/DL (ref 6.4–8.2)
PROT UR STRIP-MCNC: NEGATIVE MG/DL
RBC #/AREA URNS HPF: ABNORMAL /HPF (ref 0–5)
SODIUM SERPL-SCNC: 140 MMOL/L (ref 136–145)
SP GR UR REFRACTOMETRY: 1 (ref 1–1.03)
T4 FREE SERPL-MCNC: 1 NG/DL (ref 0.8–1.5)
TRIGL SERPL-MCNC: 77 MG/DL (ref ?–150)
TSH SERPL DL<=0.05 MIU/L-ACNC: 2.6 UIU/ML (ref 0.36–3.74)
UROBILINOGEN UR QL STRIP.AUTO: 0.2 EU/DL (ref 0.2–1)
VLDLC SERPL CALC-MCNC: 15.4 MG/DL
WBC URNS QL MICRO: ABNORMAL /HPF (ref 0–4)

## 2022-02-01 PROCEDURE — 1101F PT FALLS ASSESS-DOCD LE1/YR: CPT | Performed by: FAMILY MEDICINE

## 2022-02-01 PROCEDURE — 99214 OFFICE O/P EST MOD 30 MIN: CPT | Performed by: FAMILY MEDICINE

## 2022-02-01 PROCEDURE — G8417 CALC BMI ABV UP PARAM F/U: HCPCS | Performed by: FAMILY MEDICINE

## 2022-02-01 PROCEDURE — G9899 SCRN MAM PERF RSLTS DOC: HCPCS | Performed by: FAMILY MEDICINE

## 2022-02-01 PROCEDURE — G8752 SYS BP LESS 140: HCPCS | Performed by: FAMILY MEDICINE

## 2022-02-01 PROCEDURE — 3017F COLORECTAL CA SCREEN DOC REV: CPT | Performed by: FAMILY MEDICINE

## 2022-02-01 PROCEDURE — G0463 HOSPITAL OUTPT CLINIC VISIT: HCPCS | Performed by: FAMILY MEDICINE

## 2022-02-01 PROCEDURE — G8427 DOCREV CUR MEDS BY ELIG CLIN: HCPCS | Performed by: FAMILY MEDICINE

## 2022-02-01 PROCEDURE — G8754 DIAS BP LESS 90: HCPCS | Performed by: FAMILY MEDICINE

## 2022-02-01 PROCEDURE — G0439 PPPS, SUBSEQ VISIT: HCPCS | Performed by: FAMILY MEDICINE

## 2022-02-01 PROCEDURE — 1090F PRES/ABSN URINE INCON ASSESS: CPT | Performed by: FAMILY MEDICINE

## 2022-02-01 PROCEDURE — G8510 SCR DEP NEG, NO PLAN REQD: HCPCS | Performed by: FAMILY MEDICINE

## 2022-02-01 PROCEDURE — G8399 PT W/DXA RESULTS DOCUMENT: HCPCS | Performed by: FAMILY MEDICINE

## 2022-02-01 PROCEDURE — G8536 NO DOC ELDER MAL SCRN: HCPCS | Performed by: FAMILY MEDICINE

## 2022-02-01 NOTE — PROGRESS NOTES
HPI  Carlos Rizvi 70 y.o. female  presents to the office today for AWV and HTN. Blood pressure 139/80, pulse 75, temperature 98.4 °F (36.9 °C), temperature source Temporal, resp. rate 16, height 5' 1\" (1.549 m), weight 195 lb (88.5 kg), last menstrual period 03/02/2001, SpO2 99 %. Body mass index is 36.84 kg/m². Chief Complaint   Patient presents with    Annual Wellness Visit    Hypertension      HTN: BP at office today 140/68 and 136/80 on manual recheck. Pt is not currently on any anti-hypertensive medications. I have advised the pt to lose weight (10,000 steps a day) and reduce her sodium intake. Pt will have updated lab work performed during today's OV. Asthma: Pt reports that her asthma is under control. She is currently on Singulair 10mg take 1 tablet po daily prn. Malaise: Pt reports that they have been experiencing increased fatigue and malaise throughout the day. I have placed orders for labwork to be performed to further assess what may be causing pt this discomfort. Pt reports that she gets sinus drainage which makes her cough a little bit. Lipid Screening: Lipid panel on 02/02/21 notable for total cholesterol 186, HDL 60, .6, and triglycerides 82. Pt is not currently on any statin therapies and will have lipid screening performed. Pt will have updated lab work performed during today's OV. Factor V Leiden Mutation: Pt has a MHx of Factor V Leiden Mutation which is well controlled. Severe Obesity: I have reviewed/discussed the above normal BMI with the patient. I have recommended the following interventions: dietary management education, guidance, and counseling, encourage exercise and monitor weight. I have referred the pt to , Internal Medicine. Health Maintenance: Pt is due for an updated mammogram; orders have been placed for this imaging to be performed.     Pt is due for their colorectal cancer screening; I have placed orders for pt to receive a Cologuard test at their home. Medicare questionnaire discussed and responses reviewed today in office. Current Outpatient Medications   Medication Sig Dispense Refill    montelukast (SINGULAIR) 10 mg tablet Take 1 tab po daily prn 90 Tablet 1     Allergies   Allergen Reactions    Sulfasalazine Hives    Bactrim [Sulfamethoprim Ds] Hives    Clindamycin Hives     Past Medical History:   Diagnosis Date    AR (allergic rhinitis) 2010    Chronic Venous Stasis BLE 10/14/2010    DJD (degenerative joint disease) of knee 2010    Factor V Leiden mutation ([facility] [address] 2010    HTN (hypertension) 2010    Plantar fasciitis 2010    Postmenopausal 2010    Stasis, venous 2010    Stroke ([facility] [address]     T. I.A. 2010    Venous stasis dermatitis 10/14/2010     Past Surgical History:   Procedure Laterality Date    HX BACK SURGERY      lumbar disc    HX  SECTION      HX HYSTERECTOMY  2019    total     Family History   Problem Relation Age of Onset    Asthma Sister     Asthma Brother     Mult Sclerosis Daughter     Allergic Rhinitis Son     Thyroid Disease Daughter      Social History     Tobacco Use    Smoking status: Never Smoker    Smokeless tobacco: Never Used   Substance Use Topics    Alcohol use: No        Review of Systems   Constitutional: Positive for malaise/fatigue. Negative for chills and fever. HENT: Negative for hearing loss and tinnitus. Eyes: Negative for blurred vision and double vision. Respiratory: Negative for cough and shortness of breath. Cardiovascular: Negative for chest pain and palpitations. Gastrointestinal: Negative for nausea and vomiting. Genitourinary: Negative for dysuria and frequency. Musculoskeletal: Negative for back pain and falls. Skin: Negative for itching and rash. Neurological: Negative for dizziness, loss of consciousness and headaches. Endo/Heme/Allergies: Negative.     Psychiatric/Behavioral: Negative for depression. The patient is not nervous/anxious. Physical Exam  Constitutional:       Appearance: Normal appearance. HENT:      Head: Normocephalic and atraumatic. Right Ear: Tympanic membrane, ear canal and external ear normal.      Left Ear: Tympanic membrane, ear canal and external ear normal.      Nose: Nose normal.      Mouth/Throat:      Mouth: Mucous membranes are moist.      Pharynx: Oropharynx is clear. Eyes:      Extraocular Movements: Extraocular movements intact. Conjunctiva/sclera: Conjunctivae normal.      Pupils: Pupils are equal, round, and reactive to light. Cardiovascular:      Rate and Rhythm: Normal rate and regular rhythm. Pulses: Normal pulses. Heart sounds: Normal heart sounds. Pulmonary:      Effort: Pulmonary effort is normal.      Breath sounds: Normal breath sounds. Abdominal:      General: Abdomen is flat. Bowel sounds are normal.      Palpations: Abdomen is soft. Genitourinary:     General: Normal vulva. Rectum: Normal.   Musculoskeletal:         General: Normal range of motion. Skin:     General: Skin is warm and dry. Neurological:      General: No focal deficit present. Mental Status: She is alert and oriented to person, place, and time. Mental status is at baseline. Psychiatric:         Mood and Affect: Mood normal.         Behavior: Behavior normal.         Judgment: Judgment normal.           ASSESSMENT and PLAN  Diagnoses and all orders for this visit:    1. Moderate persistent asthma with acute exacerbation  Pt reports that her asthma is under control. She is currently on Singulair 10mg take 1 tablet po daily prn.    2. Lipid screening   Lipid panel on 02/02/21 notable for total cholesterol 186, HDL 60, .6, and triglycerides 82. Pt is not currently on any statin therapies and will have lipid screening performed.  Pt will have updated lab work performed during today's [address] [place] [address] COMPREHENSIVE; Future  -     LIPID PANEL; Future    3. Factor V Leiden mutation ([facility] [address]  Pt has a MHx of Factor V Leiden Mutation which is well controlled. Assessment & Plan:   well controlled, continue current treatment plan    4. Severe obesity (BMI 35.0-35.9 with comorbidity) ([facility] [address]   I have reviewed/discussed the above normal BMI with the patient. I have recommended the following interventions: dietary management education, guidance, and counseling, encourage exercise and monitor weight. I have referred the pt to , Internal Medicine.  -     REFERRAL TO WEIGHT LOSS    5. Screening mammogram, encounter for  Pt is due for an updated mammogram; orders have been placed for this imaging to be performed. -     [facility] MAMMO BI SCREENING INCL CAD; Future    6. Colon cancer screening  Pt is due for their colorectal cancer screening; I have placed orders for pt to receive a Cologuard test at their home.   -     COLOGUARD TEST (FECAL DNA COLORECTAL CANCER SCREENING)    7. Malaise  Pt reports that they have been experiencing increased fatigue and malaise throughout the day. I have placed orders for labwork to be performed to further assess what may be causing pt this discomfort.   -     TSH 3RD GENERATION; Future  -     T4, FREE; Future  -     URINALYSIS W/MICROSCOPIC; Future      Follow-up and Dispositions    · Return in about 6 months (around 8/1/2022) for follow up. Medication risks/benefits/costs/interactions/alternatives discussed with patient. Advised patient to call back or return to office if symptoms worsen/change/persist.  If patient cannot reach us or should anything more severe/urgent arise he/she should proceed directly to the nearest emergency department. Discussed expected course/resolution/complications of diagnosis in detail with patient. Patient given a written after visit summary which includes diagnoses, current medications and vitals.   Patient expressed understanding with the diagnosis and plan. Written by elvira Josue, as dictated by Taurus Lee M.D.    9:11 AM - 9:27 AM    Total time spent with the patient 16 minutes, greater than 50% of time spent counseling patient.

## 2022-02-01 NOTE — PROGRESS NOTES
Chief Complaint   Patient presents with    Annual Wellness Visit    Hypertension     1. \"Have you been to the ER, urgent care clinic since your last visit? Hospitalized since your last visit? \" no    2. \"Have you seen or consulted any other health care providers outside of the [address] [place] [address] since your last visit? \"  no    3. For patients aged 39-70: Has the patient had a colonoscopy / FIT/ Cologuard? No       If the patient is female:    4. For patients aged 41-77: Has the patient had a mammogram within the past 2 years? Due       5. For patients aged 21-65: Has the patient had a pap smear?  Not required any longer

## 2022-02-02 DIAGNOSIS — Z76.89 ENCOUNTER FOR WEIGHT MANAGEMENT: Primary | ICD-10-CM

## 2022-02-02 DIAGNOSIS — E66.01 MORBID OBESITY (HCC): ICD-10-CM

## 2022-02-07 NOTE — PROGRESS NOTES
. Abdi Zayas,  As we discussed diet and exercise may be davis in getting your cholesterol panel and also your labile blood pressure under better control. Lets work on that and follow-up as discussed. There were no acute issues noted on labs except the fact that your LDL bad cholesterol went up from 109 to 241.     Any questions let me know

## 2022-02-07 NOTE — PROGRESS NOTES
Called, spoke to pt. Two pt identifiers confirmed. Writer informed patient of abnormal lab and recommendation. Pt verbalized understanding of information discussed w/ no further questions at this time.

## 2022-03-18 PROBLEM — Z71.89 ACP (ADVANCE CARE PLANNING): Status: ACTIVE | Noted: 2018-02-12

## 2022-03-19 PROBLEM — H43.812 PVD (POSTERIOR VITREOUS DETACHMENT), LEFT EYE: Status: ACTIVE | Noted: 2017-09-18

## 2022-03-19 PROBLEM — J45.20 MILD INTERMITTENT ASTHMA WITHOUT COMPLICATION: Status: ACTIVE | Noted: 2018-02-12

## 2022-03-19 PROBLEM — H25.012 CORTICAL AGE-RELATED CATARACT OF LEFT EYE: Status: ACTIVE | Noted: 2017-09-18

## 2022-03-19 PROBLEM — E66.01 SEVERE OBESITY (HCC): Status: ACTIVE | Noted: 2020-08-04

## 2022-03-19 PROBLEM — H25.011 CORTICAL AGE-RELATED CATARACT OF RIGHT EYE: Status: ACTIVE | Noted: 2017-09-18

## 2022-03-20 PROBLEM — M85.80 OSTEOPENIA: Status: ACTIVE | Noted: 2017-04-12

## 2022-07-26 ENCOUNTER — PATIENT MESSAGE (OUTPATIENT)
Dept: FAMILY MEDICINE CLINIC | Age: 72
End: 2022-07-26

## 2022-10-10 ENCOUNTER — OFFICE VISIT (OUTPATIENT)
Dept: FAMILY MEDICINE CLINIC | Age: 72
End: 2022-10-10
Payer: MEDICARE

## 2022-10-10 VITALS
HEART RATE: 71 BPM | DIASTOLIC BLOOD PRESSURE: 68 MMHG | OXYGEN SATURATION: 99 % | WEIGHT: 188.8 LBS | RESPIRATION RATE: 16 BRPM | TEMPERATURE: 97.3 F | HEIGHT: 61 IN | BODY MASS INDEX: 35.65 KG/M2 | SYSTOLIC BLOOD PRESSURE: 136 MMHG

## 2022-10-10 DIAGNOSIS — Z12.31 ENCOUNTER FOR SCREENING MAMMOGRAM FOR MALIGNANT NEOPLASM OF BREAST: Primary | ICD-10-CM

## 2022-10-10 DIAGNOSIS — E78.5 HYPERLIPIDEMIA, UNSPECIFIED HYPERLIPIDEMIA TYPE: ICD-10-CM

## 2022-10-10 DIAGNOSIS — J45.41 MODERATE PERSISTENT ASTHMA WITH ACUTE EXACERBATION: ICD-10-CM

## 2022-10-10 DIAGNOSIS — Z23 ENCOUNTER FOR IMMUNIZATION: ICD-10-CM

## 2022-10-10 LAB
ALBUMIN SERPL-MCNC: 4 G/DL (ref 3.5–5)
ALBUMIN/GLOB SERPL: 1.4 {RATIO} (ref 1.1–2.2)
ALP SERPL-CCNC: 74 U/L (ref 45–117)
ALT SERPL-CCNC: 24 U/L (ref 12–78)
ANION GAP SERPL CALC-SCNC: 4 MMOL/L (ref 5–15)
AST SERPL-CCNC: 18 U/L (ref 15–37)
BILIRUB SERPL-MCNC: 0.3 MG/DL (ref 0.2–1)
BUN SERPL-MCNC: 16 MG/DL (ref 6–20)
BUN/CREAT SERPL: 21 (ref 12–20)
CALCIUM SERPL-MCNC: 9.2 MG/DL (ref 8.5–10.1)
CHLORIDE SERPL-SCNC: 107 MMOL/L (ref 97–108)
CHOLEST SERPL-MCNC: 188 MG/DL
CO2 SERPL-SCNC: 30 MMOL/L (ref 21–32)
CREAT SERPL-MCNC: 0.77 MG/DL (ref 0.55–1.02)
GLOBULIN SER CALC-MCNC: 2.9 G/DL (ref 2–4)
GLUCOSE SERPL-MCNC: 93 MG/DL (ref 65–100)
HDLC SERPL-MCNC: 52 MG/DL
HDLC SERPL: 3.6 {RATIO} (ref 0–5)
LDLC SERPL CALC-MCNC: 124.8 MG/DL (ref 0–100)
POTASSIUM SERPL-SCNC: 4.4 MMOL/L (ref 3.5–5.1)
PROT SERPL-MCNC: 6.9 G/DL (ref 6.4–8.2)
SODIUM SERPL-SCNC: 141 MMOL/L (ref 136–145)
TRIGL SERPL-MCNC: 56 MG/DL (ref ?–150)
VLDLC SERPL CALC-MCNC: 11.2 MG/DL

## 2022-10-10 PROCEDURE — G0463 HOSPITAL OUTPT CLINIC VISIT: HCPCS | Performed by: FAMILY MEDICINE

## 2022-10-10 PROCEDURE — 3017F COLORECTAL CA SCREEN DOC REV: CPT | Performed by: FAMILY MEDICINE

## 2022-10-10 PROCEDURE — G8399 PT W/DXA RESULTS DOCUMENT: HCPCS | Performed by: FAMILY MEDICINE

## 2022-10-10 PROCEDURE — 1090F PRES/ABSN URINE INCON ASSESS: CPT | Performed by: FAMILY MEDICINE

## 2022-10-10 PROCEDURE — G8417 CALC BMI ABV UP PARAM F/U: HCPCS | Performed by: FAMILY MEDICINE

## 2022-10-10 PROCEDURE — G8754 DIAS BP LESS 90: HCPCS | Performed by: FAMILY MEDICINE

## 2022-10-10 PROCEDURE — 99214 OFFICE O/P EST MOD 30 MIN: CPT | Performed by: FAMILY MEDICINE

## 2022-10-10 PROCEDURE — G8427 DOCREV CUR MEDS BY ELIG CLIN: HCPCS | Performed by: FAMILY MEDICINE

## 2022-10-10 PROCEDURE — 1123F ACP DISCUSS/DSCN MKR DOCD: CPT | Performed by: FAMILY MEDICINE

## 2022-10-10 PROCEDURE — G8510 SCR DEP NEG, NO PLAN REQD: HCPCS | Performed by: FAMILY MEDICINE

## 2022-10-10 PROCEDURE — G8536 NO DOC ELDER MAL SCRN: HCPCS | Performed by: FAMILY MEDICINE

## 2022-10-10 PROCEDURE — G9899 SCRN MAM PERF RSLTS DOC: HCPCS | Performed by: FAMILY MEDICINE

## 2022-10-10 PROCEDURE — 1101F PT FALLS ASSESS-DOCD LE1/YR: CPT | Performed by: FAMILY MEDICINE

## 2022-10-10 PROCEDURE — 90694 VACC AIIV4 NO PRSRV 0.5ML IM: CPT | Performed by: FAMILY MEDICINE

## 2022-10-10 PROCEDURE — G8752 SYS BP LESS 140: HCPCS | Performed by: FAMILY MEDICINE

## 2022-10-10 RX ORDER — SOLIFENACIN SUCCINATE 10 MG/1
TABLET, FILM COATED ORAL
COMMUNITY
Start: 2022-09-09

## 2022-10-10 NOTE — PROGRESS NOTES
HPI  Jordan Guillory 67 y.o. female  presents to the office today for a follow up on hypertension. Blood pressure 136/68, pulse 71, temperature 97.3 °F (36.3 °C), temperature source Temporal, resp. rate 16, height 5' 1\" (1.549 m), weight 188 lb 12.8 oz (85.6 kg), last menstrual period 03/02/2001, SpO2 99 %. Body mass index is 35.67 kg/m². Chief Complaint   Patient presents with    Hypertension      Hypertension: BP at office today 136/68. Pt not on anti-hypertensive medication. Asthma: Pt continues with Singulair 10mg prn. She notes she has not needed to take it recently. Hyperlipidemia: Lipid panel on 2/1/22 notable for total cholesterol 218, HDL 61, .6, and triglycerides 77. Pt is not on statin therapy. She has been working on making changes to her diet. Pt reports knee pain. She has had cortisone injections with orthopedist. She is not interested in surgery at this time. Health Maintenance:   Pt due for second shingles vaccine-- she plans to get it. Pt does not have ACP. She is not interested in referral to ACP specialist today. Pt due for mammogram-- order placed today. Pt had COVID booster in Feb 2022. She plans to get new booster. Pt due for flu vaccine-- administered today. Pt did cologuard in Feb 2022 (negative)-- will repeat in 3 years.       Current Outpatient Medications   Medication Sig Dispense Refill    solifenacin (VESICARE) 10 mg tablet TAKE 1 TABLET BY MOUTH ONCE DAILY FOR 30 DAYS      montelukast (SINGULAIR) 10 mg tablet Take 1 tab po daily prn 90 Tablet 1     Allergies   Allergen Reactions    Sulfasalazine Hives    Bactrim [Sulfamethoprim Ds] Hives    Clindamycin Hives     Past Medical History:   Diagnosis Date    AR (allergic rhinitis) 2/19/2010    Chronic Venous Stasis BLE 10/14/2010    DJD (degenerative joint disease) of knee 2/19/2010    Factor V Leiden mutation ([facility] [address] 2/19/2010    HTN (hypertension) 2/19/2010    Plantar fasciitis 2/19/2010 Postmenopausal 2010    Stasis, venous 2010    Stroke ([address]     T. I.A. 2010    Venous stasis dermatitis 10/14/2010     Past Surgical History:   Procedure Laterality Date    HX BACK SURGERY      lumbar disc    HX  SECTION      HX HYSTERECTOMY  2019    total     Family History   Problem Relation Age of Onset    Asthma Sister     Asthma Brother     Mult Sclerosis Daughter     Allergic Rhinitis Son     Thyroid Disease Daughter      Social History     Tobacco Use    Smoking status: Never    Smokeless tobacco: Never   Substance Use Topics    Alcohol use: No        Review of Systems   Constitutional:  Negative for chills and fever. HENT:  Negative for hearing loss and tinnitus. Eyes:  Negative for blurred vision and double vision. Respiratory:  Negative for cough and shortness of breath. Cardiovascular:  Negative for chest pain and palpitations. Gastrointestinal:  Negative for nausea and vomiting. Genitourinary:  Negative for dysuria and frequency. Musculoskeletal:  Positive for joint pain (knee). Negative for back pain and falls. Skin:  Negative for itching and rash. Neurological:  Negative for dizziness, loss of consciousness and headaches. Endo/Heme/Allergies: Negative. Psychiatric/Behavioral:  Negative for depression. The patient is not nervous/anxious. Physical Exam  Vitals and nursing note reviewed. Constitutional:       General: She is not in acute distress. Appearance: She is well-developed. She is not diaphoretic. HENT:      Head: Normocephalic and atraumatic. Cardiovascular:      Rate and Rhythm: Normal rate and regular rhythm. Heart sounds: Normal heart sounds. No murmur heard. No friction rub. No gallop. Pulmonary:      Effort: Pulmonary effort is normal. No respiratory distress. Breath sounds: Normal breath sounds. No wheezing or rales. Chest:      Chest wall: No tenderness.    Abdominal:      General: Bowel sounds are normal. There is no distension. Palpations: Abdomen is soft. Tenderness: There is no abdominal tenderness. Musculoskeletal:         General: Normal range of motion. Cervical back: Normal range of motion and neck supple. Neurological:      Mental Status: She is alert and oriented to person, place, and time. Psychiatric:         Behavior: Behavior normal.         Thought Content: Thought content normal.         Judgment: Judgment normal.         ASSESSMENT and PLAN  Diagnoses and all orders for this visit:    1. Encounter for screening mammogram for malignant neoplasm of breast  Pt is due for an updated mammogram; orders have been placed for this imaging to be performed. -     [facility] MAMMO BI SCREENING INCL CAD; Future    2. Moderate persistent asthma with acute exacerbation  Pt continues with Singulair 10mg prn. She does not need refills. 3. Hyperlipidemia, unspecified hyperlipidemia type  I encouraged pt to work on diet and exercise. Pt will have updated lab work performed during today's [address]; Future  -     LIPID PANEL; Future    4. Encounter for immunization  Pt due for flu vaccine-- administered today. -     INFLUENZA, FLUAD, (AGE 72 Y+), IM, PF, 0.5 ML    Regarding her knee, pt is not interested in surgery at this time. We discussed that she will know when she is ready to seek surgery for her knee. We also discussed referral to pain specialist, she will let me know if she would like a referral. I encouraged her to consider water aerobic activities. Follow-up and Dispositions    Return in about 6 months (around 4/10/2023) for hyperlipidemia follow up. Medication risks/benefits/costs/interactions/alternatives discussed with patient.   Advised patient to call back or return to office if symptoms worsen/change/persist.  If patient cannot reach us or should anything more severe/urgent arise he/she should proceed directly to the nearest emergency department. Discussed expected course/resolution/complications of diagnosis in detail with patient. Patient given a written after visit summary which includes diagnoses, current medications and vitals. Patient expressed understanding with the diagnosis and plan. Written by elvira Barrera, as dictated by Kervin León M.D.    9:17 AM - 9:30 AM    Total time spent with the patient 15 minutes, greater than 50% of time spent counseling patient.

## 2022-10-10 NOTE — PROGRESS NOTES
Chief Complaint   Patient presents with    Hypertension     1. \"Have you been to the ER, urgent care clinic since your last visit? Hospitalized since your last visit? \" no    2. \"Have you seen or consulted any other health care providers outside of the [address] [place] [address] since your last visit? \"  no    3. For patients aged 39-70: Has the patient had a colonoscopy / FIT/ Cologuard? No gap       If the patient is female:    4. For patients aged 41-77: Has the patient had a mammogram within the past 2 years? Has gap     5. For patients aged 21-65: Has the patient had a pap smear? no gap

## 2022-12-01 ENCOUNTER — OFFICE VISIT (OUTPATIENT)
Dept: FAMILY MEDICINE CLINIC | Age: 72
End: 2022-12-01
Payer: MEDICARE

## 2022-12-01 VITALS
BODY MASS INDEX: 34.44 KG/M2 | HEART RATE: 77 BPM | DIASTOLIC BLOOD PRESSURE: 79 MMHG | WEIGHT: 182.4 LBS | TEMPERATURE: 99 F | HEIGHT: 61 IN | OXYGEN SATURATION: 100 % | SYSTOLIC BLOOD PRESSURE: 139 MMHG | RESPIRATION RATE: 16 BRPM

## 2022-12-01 DIAGNOSIS — J45.41 MODERATE PERSISTENT ASTHMA WITH ACUTE EXACERBATION: ICD-10-CM

## 2022-12-01 DIAGNOSIS — J40 BRONCHITIS: Primary | ICD-10-CM

## 2022-12-01 PROCEDURE — G0463 HOSPITAL OUTPT CLINIC VISIT: HCPCS | Performed by: NURSE PRACTITIONER

## 2022-12-01 PROCEDURE — 94640 AIRWAY INHALATION TREATMENT: CPT | Performed by: NURSE PRACTITIONER

## 2022-12-01 RX ORDER — LEVALBUTEROL INHALATION SOLUTION 1.25 MG/3ML
1.25 SOLUTION RESPIRATORY (INHALATION)
Status: COMPLETED | OUTPATIENT
Start: 2022-12-01 | End: 2022-12-01

## 2022-12-01 RX ORDER — METHYLPREDNISOLONE 4 MG/1
TABLET ORAL
Qty: 1 DOSE PACK | Refills: 0 | Status: SHIPPED | OUTPATIENT
Start: 2022-12-01

## 2022-12-01 RX ORDER — AZITHROMYCIN 250 MG/1
TABLET, FILM COATED ORAL
Qty: 6 TABLET | Refills: 0 | Status: SHIPPED | OUTPATIENT
Start: 2022-12-01 | End: 2022-12-06

## 2022-12-01 RX ORDER — ALBUTEROL SULFATE 90 UG/1
1 AEROSOL, METERED RESPIRATORY (INHALATION)
Qty: 18 G | Refills: 0 | Status: SHIPPED | OUTPATIENT
Start: 2022-12-01

## 2022-12-01 RX ORDER — BENZONATATE 200 MG/1
200 CAPSULE ORAL
Qty: 21 CAPSULE | Refills: 0 | Status: SHIPPED | OUTPATIENT
Start: 2022-12-01 | End: 2022-12-08

## 2022-12-01 RX ADMIN — LEVALBUTEROL HYDROCHLORIDE 1.25 MG: 1.25 SOLUTION RESPIRATORY (INHALATION) at 10:39

## 2022-12-01 NOTE — PROGRESS NOTES
[facility] Note     Jordan Guillory (: 1950) is a 67 y.o. female, established patient, here for evaluation of the following chief complaint(s):  Cough (Very bad at night, productive)       ASSESSMENT/PLAN:  1. Bronchitis  -     levalbuterol (XOPENEX) nebulizer soln 1.25 mg/3 mL; 1.25 mg, Nebulization, NOW, 1 dose, On Thu 22 at [facility]: Nebulizer  -     [place] PRESSURIZED/NONPRESSURIZED INHALATION TREATMENT  -     benzonatate (TESSALON) 200 mg capsule; Take 1 Capsule by mouth three (3) times daily as needed for Cough for up to 7 days. , Normal, Disp-21 Capsule, R-0  -     azithromycin (ZITHROMAX) 250 mg tablet; Take 2 tablets today, then take 1 tablet daily, Normal, Disp-6 Tablet, R-0  2. Moderate persistent asthma with acute exacerbation  -     levalbuterol (XOPENEX) nebulizer soln 1.25 mg/3 mL; 1.25 mg, Nebulization, NOW, 1 dose, On Thu 22 at [address] [place] [address]: Nebulizer  -     [place] PRESSURIZED/NONPRESSURIZED INHALATION TREATMENT  -     albuterol (PROVENTIL HFA, VENTOLIN HFA, PROAIR HFA) 90 mcg/actuation inhaler; Take 1 Puff by inhalation every four (4) hours as needed for Wheezing., Normal, Disp-18 g, R-0  -     methylPREDNISolone (MEDROL DOSEPACK) 4 mg tablet; Per pack instructions, Normal, Disp-1 Dose Pack, R-0      Return if symptoms worsen or fail to improve. On this date 2022 I have spent 35 minutes reviewing previous notes, test results and face to face with the patient discussing the diagnosis and importance of compliance with the treatment plan as well as documenting on the day of the visit. SUBJECTIVE/OBJECTIVE:    Jordan Guillory is a 67 y.o. female seen today for URI      URI:   This started 1 week ago, and is not improving. She also reports myalgias, headache, sinus congestion, sore throat, productive cough, wheezing and sweats & chills. Treatments have included: OTC products, which have been not very effective.   Relevant PMH: Asthma. Patient reports sick contacts: no. No recent travel. REVIEW OF SYSTEMS:    Review of Systems   Constitutional:  Positive for chills, diaphoresis and fatigue. Negative for fever. HENT:  Positive for congestion, rhinorrhea, sinus pressure, sinus pain and sore throat. Respiratory:  Positive for cough, chest tightness and wheezing. Negative for shortness of breath. Cardiovascular: Negative. Genitourinary: Negative. Musculoskeletal:  Positive for myalgias. Negative for gait problem. Skin: Negative. Neurological:  Positive for headaches. Negative for dizziness and light-headedness. VITAL SIGNS:    Wt Readings from Last 3 Encounters:   12/01/22 182 lb 6.4 oz (82.7 kg)   10/10/22 188 lb 12.8 oz (85.6 kg)   02/01/22 195 lb (88.5 kg)     Temp Readings from Last 3 Encounters:   12/01/22 99 °F (37.2 °C) (Oral)   10/10/22 97.3 °F (36.3 °C) (Temporal)   02/01/22 98.4 °F (36.9 °C) (Temporal)     BP Readings from Last 3 Encounters:   12/01/22 139/79   10/10/22 136/68   02/01/22 139/80     Pulse Readings from Last 3 Encounters:   12/01/22 77   10/10/22 71   02/01/22 75           PHYSICAL EXAMINATION:       General: Alert, cooperative, no distress  Respiratory: Bilateral rhonchi with expiratory wheeze   Post- Neb treatment - breathing comfortably, in no acute respiratory distress. Scattered rhonchi bilat. Cardiovascular: Regular rate and rhythm, S1, S2 normal, no murmur, click, rub or gallop. Extremities: no edema. Abdomen: Soft, non-tender, not distended. Bowel sounds normal. No masses or organomegaly. MSK: Extremities normal appearing, atraumatic, no effusion. Gait steady and unassisted. Skin: Skin color, texture, turgor normal. No rashes or lesions on exposed skin. Neurologic: A/Ox3  Psychiatric: Normal affect. Mood euthymic. Thoughts logical. Speech volume and speed normal            Treatment risks/benefits/costs/interactions/alternatives discussed with patient.   Advised patient to call back or return to office if symptoms worsen/change/persist. If patient cannot reach us or should anything more severe/urgent arise he/she should proceed directly to the nearest emergency department. Discussed expected course/resolution/complications of diagnosis in detail with patient. Patient expressed understanding with the diagnosis and plan. An electronic signature was used to authenticate this note.   -- Edwige Marie, NP

## 2022-12-01 NOTE — PROGRESS NOTES
Chief Complaint   Patient presents with    Cough     Very bad at night, productive         1. \"Have you been to the ER, urgent care clinic since your last visit? Hospitalized since your last visit? \" No    2. \"Have you seen or consulted any other health care providers outside of the [address] [place] [address] since your last visit? \" No     3. For patients over 45: Has the patient had a colonoscopy? Yes - no Care Gap present     If the patient is female:    4. For patients over 40: Has the patient had a mammogram? Yes - no Care Gap present    5. For patients over 21: Has the patient had a pap smear?  Yes - no Care Gap present     3 most recent PHQ Screens 10/10/2022   PHQ Not Done -   Little interest or pleasure in doing things Not at all   Feeling down, depressed, irritable, or hopeless Not at all   Total Score PHQ 2 0       Health Maintenance Due   Topic Date Due    Shingrix Vaccine Age 49> (2 of 2) 04/07/2020    COVID-19 Vaccine (4 - Booster for Pfizer series) 12/08/2021    Breast Cancer Screen Mammogram  12/13/2021

## 2022-12-02 NOTE — MR AVS SNAPSHOT
Self Exam: Abdomen soft, non-tender to palpatation, non-distended Visit Information Date & Time Provider Department Dept. Phone Encounter #  
 3/15/2017  5:45 PM Naren Rooney  [facility] [phone] [id number] Follow-up Instructions Return in about 2 weeks (around 3/29/2017) for bronchitis. Upcoming Health Maintenance Date Due DTaP/Tdap/Td series (1 - Tdap) 2/11/2004 ZOSTER VACCINE AGE 60> 4/8/2010 GLAUCOMA SCREENING Q2Y 4/8/2015 OSTEOPOROSIS SCREENING (DEXA) 4/8/2015 Pneumococcal 65+ Low/Medium Risk (1 of 2 - PCV13) 4/8/2015 MEDICARE YEARLY EXAM 4/8/2015 BREAST CANCER SCRN MAMMOGRAM 4/28/2016 COLONOSCOPY 7/8/2021 Allergies as of 3/15/2017  Review Complete On: 3/15/2017 By: Jose Ramon Marie LPN Severity Noted Reaction Type Reactions Bactrim [Sulfamethoprim Ds]  03/30/2012    Hives Clindamycin  03/30/2012   Side Effect Hives Current Immunizations  Reviewed on 3/11/2017 Name Date Influenza Vaccine 11/5/2016, 12/3/2014 Td 2/10/2004 Not reviewed this visit You Were Diagnosed With   
  
 Codes Comments Bronchitis with asthma, acute    -  Primary ICD-10-CM: J20.9, J45.909 ICD-9-CM: 466.0, 493.90 Moderate persistent asthma with acute exacerbation     ICD-10-CM: J45.41 
ICD-9-CM: 493.92   
 SOB (shortness of breath)     ICD-10-CM: R06.02 
ICD-9-CM: 786.05 Vitals BP Pulse Temp Resp Height(growth percentile) Weight(growth percentile) 138/82 77 98.7 °F (37.1 °C) (Oral) 16 5' 1\" (1.549 m) 242 lb 12.8 oz (110.1 kg) LMP SpO2 BMI OB Status Smoking Status 03/02/2001 96% 45.88 kg/m2 Postmenopausal Never Smoker Vitals History BMI and BSA Data Body Mass Index Body Surface Area 45.88 kg/m 2 2.18 m 2 Preferred Pharmacy Pharmacy Name Phone [facility] [address] [phone] Your Updated Medication List  
  
   
 This list is accurate as of: 3/15/17  7:12 PM.  Always use your most recent med list.  
  
  
  
  
 * albuterol 2.5 mg /3 mL (0.083 %) nebulizer solution Commonly known as:  PROVENTIL VENTOLIN  
3 mL by Nebulization route every four (4) hours as needed for Wheezing. * albuterol 90 mcg/actuation inhaler Commonly known as:  PROAIR HFA Take 2 Puffs by inhalation every six (6) hours as needed for Wheezing or Shortness of Breath. amoxicillin-clavulanate 875-125 mg per tablet Commonly known as:  AUGMENTIN Take 1 Tab by mouth two (2) times a day for 10 days. aspirin delayed-release 81 mg tablet Take 81 mg by mouth daily. levoFLOXacin 500 mg tablet Commonly known as:  Pete Back Take 1 Tab by mouth daily for 10 days. methylPREDNISolone 4 mg tablet Commonly known as:  Meron [place] Take as directed  
  
 montelukast 10 mg tablet Commonly known as:  SINGULAIR  
TAKE ONE TABLET BY MOUTH DAILY FOR ALLERGIES AND ASTHMA THERAFLU COUGH PO Take  by mouth. TYLENOL 325 mg tablet Generic drug:  acetaminophen Take  by mouth every four (4) hours as needed for Pain. * Notice: This list has 2 medication(s) that are the same as other medications prescribed for you. Read the directions carefully, and ask your doctor or other care provider to review them with you. Prescriptions Sent to Pharmacy Refills  
 levoFLOXacin (LEVAQUIN) 500 mg tablet 0 Sig: Take 1 Tab by mouth daily for 10 days. Class: Normal  
 Pharmacy: [facility] [place] [address] #: [phone] Route: Oral  
  
Follow-up Instructions Return in about 2 weeks (around 3/29/2017) for bronchitis. To-Do List   
 03/15/2017 Imaging:  XR CHEST PA LAT   
  
 03/21/2017 1:00 PM  
  Appointment with [facility] CAR[facility] 1 at [address] [place] [address] ([phone]) Shower or bathe using soap and water.   Do not use deodorant, powder, perfumes, or lotion the day of your exam.  If your prior mammograms were not performed at [facility] 6 please bring films with you or forward prior images 2 days before your procedure. Check in at registration 15min before your appointment time unless you were instructed to do otherwise. A script is not necessary, but if you have one, please bring it on the day of the mammogram or have it faxed to the department. [facility] [phone] [facility]  [phone] [facility] [phone] [facility]  [phone] [facility] [phone] [facility] [phone] [facility] [phone] Mauricio Quevedo [phone]  
  
 03/21/2017 2:00 PM  
  Appointment with [facility] PAULA 1 at [address] [place] [address] ([phone]) Please, no calcium supplements or antacids that coat the stomach (ex: Tums, Mylanta) 24 hours prior to procedure. Maintain normal diet and medications. Dairy products are allowed. Wear an outfit with an elastic waistband (no zipper or metal snaps). Check in at registration 15min before your appointment time unless you were instructed to do otherwise. Introducing [facility] HE[facility]! Dear Julieta Whelan: Thank you for requesting a [employer] account. Our records indicate that you already have an active [employer] account. You can access your account anytime at [email or web address] Did you know that you can access your hospital and ER discharge instructions at any time in [employer]? You can also review all of your test results from your hospital stay or ER visit. Additional Information If you have questions, please visit the Frequently Asked Questions section of the [employer] website at [email or web address] Remember, [employer] is NOT to be used for urgent needs. For medical emergencies, dial 911. Now available from your iPhone and Android! Please provide this summary of care documentation to your next provider. Your primary care clinician is listed as Kings Suárez.  If you have any questions after today's visit, please call [phone].

## 2022-12-09 ENCOUNTER — HOSPITAL ENCOUNTER (OUTPATIENT)
Dept: MAMMOGRAPHY | Age: 72
End: 2022-12-09
Attending: FAMILY MEDICINE
Payer: MEDICARE

## 2022-12-09 DIAGNOSIS — Z12.31 ENCOUNTER FOR SCREENING MAMMOGRAM FOR MALIGNANT NEOPLASM OF BREAST: ICD-10-CM

## 2022-12-09 PROCEDURE — 77067 SCR MAMMO BI INCL CAD: CPT

## 2023-06-01 ENCOUNTER — APPOINTMENT (OUTPATIENT)
Dept: PHYSICAL THERAPY | Age: 73
End: 2023-06-01

## 2023-06-01 ENCOUNTER — HOSPITAL ENCOUNTER (OUTPATIENT)
Facility: HOSPITAL | Age: 73
Discharge: HOME | End: 2023-06-01
Payer: MEDICARE

## 2023-06-01 VITALS
RESPIRATION RATE: 18 BRPM | WEIGHT: 179.9 LBS | DIASTOLIC BLOOD PRESSURE: 83 MMHG | HEART RATE: 73 BPM | TEMPERATURE: 97.6 F | SYSTOLIC BLOOD PRESSURE: 119 MMHG | HEIGHT: 59 IN | BODY MASS INDEX: 36.27 KG/M2

## 2023-06-01 LAB
ANION GAP SERPL CALC-SCNC: 5 MMOL/L (ref 5–15)
APPEARANCE UR: CLEAR
BACTERIA URNS QL MICRO: NEGATIVE /HPF
BILIRUB UR QL: NEGATIVE
BUN SERPL-MCNC: 22 MG/DL (ref 6–20)
BUN/CREAT SERPL: 33 (ref 12–20)
CALCIUM SERPL-MCNC: 9.4 MG/DL (ref 8.5–10.1)
CHLORIDE SERPL-SCNC: 108 MMOL/L (ref 97–108)
CO2 SERPL-SCNC: 29 MMOL/L (ref 21–32)
COLOR UR: ABNORMAL
CREAT SERPL-MCNC: 0.67 MG/DL (ref 0.55–1.02)
EKG ATRIAL RATE: 67 BPM
EKG DIAGNOSIS: NORMAL
EKG P AXIS: 54 DEGREES
EKG P-R INTERVAL: 174 MS
EKG Q-T INTERVAL: 424 MS
EKG QRS DURATION: 80 MS
EKG QTC CALCULATION (BAZETT): 448 MS
EKG R AXIS: 24 DEGREES
EKG T AXIS: 30 DEGREES
EKG VENTRICULAR RATE: 67 BPM
EPITH CASTS URNS QL MICRO: ABNORMAL /LPF
ERYTHROCYTE [DISTWIDTH] IN BLOOD BY AUTOMATED COUNT: 13.2 % (ref 11.5–14.5)
EST. AVERAGE GLUCOSE BLD GHB EST-MCNC: 100 MG/DL
GLUCOSE SERPL-MCNC: 105 MG/DL (ref 65–100)
GLUCOSE UR STRIP.AUTO-MCNC: NEGATIVE MG/DL
HBA1C MFR BLD: 5.1 % (ref 4–5.6)
HCT VFR BLD AUTO: 46.4 % (ref 35–47)
HGB BLD-MCNC: 14.8 G/DL (ref 11.5–16)
HGB UR QL STRIP: NEGATIVE
INR PPP: 1 (ref 0.9–1.1)
KETONES UR QL STRIP.AUTO: NEGATIVE MG/DL
LEUKOCYTE ESTERASE UR QL STRIP.AUTO: ABNORMAL
MCH RBC QN AUTO: 31.2 PG (ref 26–34)
MCHC RBC AUTO-ENTMCNC: 31.9 G/DL (ref 30–36.5)
MCV RBC AUTO: 97.7 FL (ref 80–99)
NITRITE UR QL STRIP.AUTO: NEGATIVE
NRBC # BLD: 0 K/UL (ref 0–0.01)
NRBC BLD-RTO: 0 PER 100 WBC
PH UR STRIP: 5.5 (ref 5–8)
PLATELET # BLD AUTO: 227 K/UL (ref 150–400)
PMV BLD AUTO: 11.7 FL (ref 8.9–12.9)
POTASSIUM SERPL-SCNC: 4.7 MMOL/L (ref 3.5–5.1)
PROT UR STRIP-MCNC: NEGATIVE MG/DL
PROTHROMBIN TIME: 10.5 SEC (ref 9–11.1)
RBC # BLD AUTO: 4.75 M/UL (ref 3.8–5.2)
RBC #/AREA URNS HPF: ABNORMAL /HPF (ref 0–5)
SODIUM SERPL-SCNC: 142 MMOL/L (ref 136–145)
SP GR UR REFRACTOMETRY: 1.02 (ref 1–1.03)
URINE CULTURE IF INDICATED: ABNORMAL
UROBILINOGEN UR QL STRIP.AUTO: 0.2 EU/DL (ref 0.2–1)
WBC # BLD AUTO: 4.4 K/UL (ref 3.6–11)
WBC URNS QL MICRO: ABNORMAL /HPF (ref 0–4)

## 2023-06-01 PROCEDURE — 86850 RBC ANTIBODY SCREEN: CPT

## 2023-06-01 PROCEDURE — 85027 COMPLETE CBC AUTOMATED: CPT

## 2023-06-01 PROCEDURE — 81001 URINALYSIS AUTO W/SCOPE: CPT

## 2023-06-01 PROCEDURE — 80048 BASIC METABOLIC PNL TOTAL CA: CPT

## 2023-06-01 PROCEDURE — 86900 BLOOD TYPING SEROLOGIC ABO: CPT

## 2023-06-01 PROCEDURE — 85610 PROTHROMBIN TIME: CPT

## 2023-06-01 PROCEDURE — 86901 BLOOD TYPING SEROLOGIC RH(D): CPT

## 2023-06-01 PROCEDURE — 83036 HEMOGLOBIN GLYCOSYLATED A1C: CPT

## 2023-06-01 PROCEDURE — 36415 COLL VENOUS BLD VENIPUNCTURE: CPT

## 2023-06-01 RX ORDER — ACETAMINOPHEN 500 MG
500 TABLET ORAL EVERY 6 HOURS PRN
COMMUNITY

## 2023-06-01 RX ORDER — DIPHENHYDRAMINE HCL 25 MG
25 TABLET ORAL NIGHTLY PRN
COMMUNITY

## 2023-06-01 ASSESSMENT — PAIN DESCRIPTION - PAIN TYPE: TYPE: CHRONIC PAIN

## 2023-06-01 ASSESSMENT — PAIN DESCRIPTION - ONSET: ONSET: AWAKENED FROM SLEEP

## 2023-06-01 ASSESSMENT — PAIN DESCRIPTION - LOCATION: LOCATION: KNEE

## 2023-06-01 ASSESSMENT — PAIN DESCRIPTION - FREQUENCY: FREQUENCY: CONTINUOUS

## 2023-06-01 ASSESSMENT — PAIN DESCRIPTION - DESCRIPTORS: DESCRIPTORS: ACHING

## 2023-06-01 ASSESSMENT — PAIN SCALES - GENERAL: PAINLEVEL_OUTOF10: 5

## 2023-06-01 ASSESSMENT — PAIN DESCRIPTION - ORIENTATION: ORIENTATION: LEFT

## 2023-06-02 LAB
ABO + RH BLD: NORMAL
BACTERIA SPEC CULT: NORMAL
BACTERIA SPEC CULT: NORMAL
BLOOD GROUP ANTIBODIES SERPL: NORMAL
SERVICE CMNT-IMP: NORMAL
SPECIMEN EXP DATE BLD: NORMAL

## 2023-06-05 NOTE — PERIOP NOTE
[facility] INSTRUCTIONS  ORTHOPAEDIC    Surgery Date:   6/7/23    Your surgeon's office or [facility] staff will call you between 4 PM- 8 PM the day before surgery with your arrival time. If your surgery is on a Monday, you will receive a call the preceding Friday. Please report to [facility] Patient Access/Admitting on the 1st floor. Bring your insurance card, photo identification, and any copayment (if applicable). If you are going home the same day of your surgery, you must have a responsible adult to drive you home. You need to have a responsible adult to stay with you the first 24 hours after surgery and you should not drive a car for 24 hours following your surgery. Do NOT eat any solid foods after midnight the night before surgery including candy, mints or gum. You may drink clear liquids from midnight until 1 hour prior to arrival time. You may drink up to 12 ounces at one time every 4 hours. Do NOT drink alcohol or smoke 24 hours before surgery. STOP smoking for 14 days prior as it helps with breathing and healing after surgery. If you are being admitted to the hospital,please leave personal belongings/luggage in your car until you have an assigned hospital room number. Please wear comfortable clothes. Wear your glasses instead of contacts. We ask that all money, jewelry and valuables be left at home. Wear no make up, particularly mascara, the day of surgery. All body piercings, rings, and jewelry need to be removed and left at home. Please remove any nail polish or artificial nails from your fingernails. Please wear your hair loose or down. Please no pony-tails, buns, or any metal hair accessories. If you shower the morning of surgery, please do not apply any lotions or powders afterwards. You may wear deodorant. Do not shave any body area within 24 hours of your surgery. Please follow all instructions to avoid any potential surgical cancellation.   Should your
MyChart message to pt regarding positive nasal cx (MSSA) and need to start Mupirocin ointment BID x 5 days to B nostrils starting today and bathe with CHG soap for 5 days prior to surgery. Allergies and pharmacy of choice reviewed. Rx escribed to pt's pharmacy of choice. PTA medlist updated. Surgeon and PCP notified of positive culture and treatment. PRESCRIPTION:    MUPIROCIN 2% OINTMENT  QUANTITY:  #22 GRAMS  REFILLS: NONE    Apply 0.25 g (small pea-sized amount) to both nostrils twice a day for five days. JUDIT Ross NP    Message read by pt on 6/5/23.
PT HAS HISTORY OF FACTOR V LEIDEN. SHE STATES SHE DOES NOT SEE A HEMATOLOGIST FOR THIS. SHE IS FOLLOWED ONLY BY HER PCP, DR Jigna Fox, WHO'S NOTES ARE IN Epic.
Final    Comment: NEW METHOD  PLEASE NOTE NEW REFERENCE RANGE  (NOTE)  HbA1C Interpretive Ranges  <5.7              Normal  5.7 - 6.4         Consider Prediabetes  >6.5              Consider Diabetes      eAG 06/01/2023 100  mg/dL Final    INR 06/01/2023 1.0  0.9 - 1.1   Final    A single therapeutic range for Vit K antagonists may not be optimal for all indications - see June, 2008 issue of Chest, American College of Chest Physicians Evidence-Based Clinical Practice Guidelines, 8th Edition. Protime 06/01/2023 10.5  9.0 - 11.1 sec Final    Crossmatch expiration date 06/01/2023 06/04/2023,2359   Final    ABO/Rh 06/01/2023 O POSITIVE   Final    Antibody Screen 06/01/2023 NEG   Final    Color, UA 06/01/2023 YELLOW/STRAW    Final    Color Reference Range: Straw, Yellow or Dark Yellow    Appearance 06/01/2023 CLEAR  CLEAR   Final    Specific Gravity, UA 06/01/2023 1.024  1.003 - 1.030   Final    pH, Urine 06/01/2023 5.5  5.0 - 8.0   Final    Protein, UA 06/01/2023 Negative  NEG mg/dL Final    Glucose, UA 06/01/2023 Negative  NEG mg/dL Final    Ketones, Urine 06/01/2023 Negative  NEG mg/dL Final    Bilirubin Urine 06/01/2023 Negative  NEG   Final    Blood, Urine 06/01/2023 Negative  NEG   Final    Urobilinogen, Urine 06/01/2023 0.2  0.2 - 1.0 EU/dL Final    Nitrite, Urine 06/01/2023 Negative  NEG   Final    Leukocyte Esterase, Urine 06/01/2023 SMALL (A)  NEG   Final    WBC, UA 06/01/2023 0-4  0 - 4 /hpf Final    RBC, UA 06/01/2023 0-5  0 - 5 /hpf Final    Epithelial Cells UA 06/01/2023 FEW  FEW /lpf Final    Epithelial cell category consists of squamous cells and /or transitional urothelial cells. Renal tubular cells, if present, are separately identified as such.     BACTERIA, URINE 06/01/2023 Negative  NEG /hpf Final    Urine Culture if Indicated 06/01/2023 CULTURE NOT INDICATED BY UA RESULT  CNI   Final   Office Visit on 04/10/2023   Component Date Value Ref Range Status    WBC 04/10/2023 4.9  3.6 - 11.0 K/uL Final

## 2023-06-06 ENCOUNTER — ANESTHESIA EVENT (OUTPATIENT)
Facility: HOSPITAL | Age: 73
End: 2023-06-06
Payer: MEDICARE

## 2023-06-07 ENCOUNTER — ANESTHESIA (OUTPATIENT)
Facility: HOSPITAL | Age: 73
End: 2023-06-07
Payer: MEDICARE

## 2023-06-07 PROBLEM — M17.12 PRIMARY LOCALIZED OSTEOARTHRITIS OF LEFT KNEE: Status: ACTIVE | Noted: 2023-06-07

## 2023-06-07 PROCEDURE — 2580000003 HC RX 258: Performed by: PHYSICIAN ASSISTANT

## 2023-06-07 PROCEDURE — 2500000003 HC RX 250 WO HCPCS: Performed by: NURSE ANESTHETIST, CERTIFIED REGISTERED

## 2023-06-07 PROCEDURE — 6360000002 HC RX W HCPCS: Performed by: PHYSICIAN ASSISTANT

## 2023-06-07 PROCEDURE — 6360000002 HC RX W HCPCS: Performed by: NURSE ANESTHETIST, CERTIFIED REGISTERED

## 2023-06-07 PROCEDURE — 2580000003 HC RX 258: Performed by: NURSE ANESTHETIST, CERTIFIED REGISTERED

## 2023-06-07 PROCEDURE — 2580000003 HC RX 258: Performed by: ANESTHESIOLOGY

## 2023-06-07 RX ORDER — ONDANSETRON 2 MG/ML
INJECTION INTRAMUSCULAR; INTRAVENOUS PRN
Status: DISCONTINUED | OUTPATIENT
Start: 2023-06-07 | End: 2023-06-07 | Stop reason: SDUPTHER

## 2023-06-07 RX ORDER — LIDOCAINE HYDROCHLORIDE 20 MG/ML
INJECTION, SOLUTION EPIDURAL; INFILTRATION; INTRACAUDAL; PERINEURAL PRN
Status: DISCONTINUED | OUTPATIENT
Start: 2023-06-07 | End: 2023-06-07 | Stop reason: SDUPTHER

## 2023-06-07 RX ORDER — SODIUM CHLORIDE 9 MG/ML
INJECTION, SOLUTION INTRAVENOUS CONTINUOUS PRN
Status: DISCONTINUED | OUTPATIENT
Start: 2023-06-07 | End: 2023-06-07 | Stop reason: SDUPTHER

## 2023-06-07 RX ORDER — DEXAMETHASONE SODIUM PHOSPHATE 4 MG/ML
INJECTION, SOLUTION INTRA-ARTICULAR; INTRALESIONAL; INTRAMUSCULAR; INTRAVENOUS; SOFT TISSUE PRN
Status: DISCONTINUED | OUTPATIENT
Start: 2023-06-07 | End: 2023-06-07 | Stop reason: SDUPTHER

## 2023-06-07 RX ORDER — HYDROMORPHONE HCL 110MG/55ML
PATIENT CONTROLLED ANALGESIA SYRINGE INTRAVENOUS PRN
Status: DISCONTINUED | OUTPATIENT
Start: 2023-06-07 | End: 2023-06-07 | Stop reason: SDUPTHER

## 2023-06-07 RX ADMIN — SODIUM CHLORIDE: 9 INJECTION, SOLUTION INTRAVENOUS at 09:34

## 2023-06-07 RX ADMIN — MEPIVACAINE HYDROCHLORIDE 52.5 MG: 15 INJECTION, SOLUTION EPIDURAL; INFILTRATION at 07:41

## 2023-06-07 RX ADMIN — WATER 2000 MG: 1 INJECTION INTRAMUSCULAR; INTRAVENOUS; SUBCUTANEOUS at 07:50

## 2023-06-07 RX ADMIN — PHENYLEPHRINE HYDROCHLORIDE 20 MCG/MIN: 10 INJECTION INTRAVENOUS at 07:42

## 2023-06-07 RX ADMIN — ONDANSETRON HYDROCHLORIDE 4 MG: 2 INJECTION, SOLUTION INTRAMUSCULAR; INTRAVENOUS at 07:50

## 2023-06-07 RX ADMIN — HYDROMORPHONE HYDROCHLORIDE 0.5 MG: 2 INJECTION, SOLUTION INTRAMUSCULAR; INTRAVENOUS; SUBCUTANEOUS at 10:36

## 2023-06-07 RX ADMIN — PROPOFOL 50 MG: 10 INJECTION, EMULSION INTRAVENOUS at 07:35

## 2023-06-07 RX ADMIN — DEXAMETHASONE SODIUM PHOSPHATE 4 MG: 4 INJECTION, SOLUTION INTRAMUSCULAR; INTRAVENOUS at 07:50

## 2023-06-07 RX ADMIN — PROPOFOL 50 MG: 10 INJECTION, EMULSION INTRAVENOUS at 07:38

## 2023-06-07 RX ADMIN — LIDOCAINE HYDROCHLORIDE 50 MG: 20 INJECTION, SOLUTION EPIDURAL; INFILTRATION; INTRACAUDAL; PERINEURAL at 07:35

## 2023-06-07 NOTE — ANESTHESIA PROCEDURE NOTES
Spinal Block    Patient location during procedure: OR  End time: 6/7/2023 7:41 AM  Reason for block: primary anesthetic  Staffing  Performed: resident/CRNA   Resident/CRNA: Georgia Blackamn APRN - CRNA  Spinal Block  Patient position: sitting  Prep: Betadine  Patient monitoring: cardiac monitor, continuous pulse ox, continuous capnometry, frequent blood pressure checks and oxygen  Approach: midline  Location: L2/L3  Provider prep: mask and sterile gloves  Needle  Needle type: Quincke   Needle gauge: 24 G  Assessment  Swirl obtained: Yes  CSF: clear  Attempts: 1  Hemodynamics: stable  Preanesthetic Checklist  Completed: patient identified, IV checked, site marked, risks and benefits discussed, surgical/procedural consents, equipment checked, pre-op evaluation, timeout performed, anesthesia consent given, oxygen available, monitors applied/VS acknowledged, fire risk safety assessment completed and verbalized and blood product R/B/A discussed and consented

## 2023-06-07 NOTE — ANESTHESIA POSTPROCEDURE EVALUATION
Department of Anesthesiology  Postprocedure Note    Patient: Adela Coto  MRN: [id number]  YOB: 1950  Date of evaluation: 6/7/2023      Procedure Summary     Date: 06/07/23 Room / Location: [address] [place] [address] OR  / [address] [place] [address] OR    Anesthesia Start: 0728 Anesthesia Stop: 2462    Procedure: LEFT TOTAL KNEE ARTHROPLASTY (Left: Knee) Diagnosis:       Primary osteoarthritis of left knee      (Primary osteoarthritis of left knee [M17.12])    Providers: Mary Avelar MD Responsible Provider: Russell Alvarado MD    Anesthesia Type: Regional, MAC ASA Status: 3          Anesthesia Type: Regional, MAC    Linus Phase I:      Linus Phase II:        Anesthesia Post Evaluation    Patient location during evaluation: PACU  Patient participation: complete - patient participated  Level of consciousness: awake  Pain score: 2  Airway patency: patent  Nausea & Vomiting: no nausea  Complications: no  Cardiovascular status: blood pressure returned to baseline  Respiratory status: acceptable  Hydration status: euvolemic

## 2023-06-07 NOTE — ANESTHESIA PRE PROCEDURE
SHIELA        ceFAZolin (ANCEF) 2,000 mg in sterile water 20 mL IV syringe  2,000 mg IntraVENous On Call to [address] [place] [address] Ne, SHIELA        lidocaine PF 1 % injection 1 mL  1 mL IntraDERmal Once PRN Ne Larsen MD        fentaNYL (SUBLIMAZE) injection 100 mcg  100 mcg IntraVENous Once PRN Ne Larsen MD        lactated ringers IV soln infusion   IntraVENous Continuous Ne Larsen  mL/hr at 06/07/23 0642 New Bag at 06/07/23 0642    sodium chloride flush 0.9 % injection 5-40 mL  5-40 mL IntraVENous 2 times per day Ne Larsen MD        sodium chloride flush 0.9 % injection 5-40 mL  5-40 mL IntraVENous PRN Ne Larsen MD        0.9 % sodium chloride infusion   IntraVENous PRN Ne Larsen MD        midazolam PF (VERSED) injection 2 mg  2 mg IntraVENous Once PRN Ne Larsen MD           Allergies:     Allergies   Allergen Reactions    Sulfasalazine Hives    Clindamycin Hives    Sulfa Antibiotics Hives       Problem List:    Patient Active Problem List   Diagnosis Code    Stasis, venous I87.8    Plantar fasciitis M72.2    AR (allergic rhinitis) J30.9    Venous stasis of lower extremity I87.8    ACP (advance care planning) Z71.89    HTN (hypertension) I10    Factor V Leiden mutation ([facility]) D68.51    Postmenopausal Z78.0    Cortical age-related cataract of left eye H25.012    Cortical age-related cataract of right eye H25.011    Mild intermittent asthma without complication T76.80    Venous stasis dermatitis I87.2    Severe obesity ([facility]) E66.01    PVD (posterior vitreous detachment), left eye H43.812    DJD (degenerative joint disease) of knee M17.9    Osteopenia M85.80    Primary localized osteoarthritis of left knee M17.12       Past Medical History:        Diagnosis Date    Anesthesia complication     PT STATES SHE HAD AN ADVERSE REACTION TO A SPINAL 40 YEARS AGO    AR (allergic rhinitis) 2/19/2010    Asthma     NO CURRENT TREATMENT    DJD

## 2023-06-08 ENCOUNTER — TELEPHONE (OUTPATIENT)
Age: 73
End: 2023-06-08

## 2023-06-08 NOTE — TELEPHONE ENCOUNTER
Damian Olivo called from [facility] and requested 4/10/23 office notes.      FAX# [phone]  BCB# [phone]

## 2023-06-22 ENCOUNTER — APPOINTMENT (OUTPATIENT)
Dept: PHYSICAL THERAPY | Age: 73
End: 2023-06-22

## 2023-06-22 ENCOUNTER — HOSPITAL ENCOUNTER (OUTPATIENT)
Facility: HOSPITAL | Age: 73
Setting detail: RECURRING SERIES
Discharge: HOME | End: 2023-06-25
Payer: MEDICARE

## 2023-06-22 PROCEDURE — 97161 PT EVAL LOW COMPLEX 20 MIN: CPT

## 2023-06-22 PROCEDURE — 97110 THERAPEUTIC EXERCISES: CPT

## 2023-06-22 PROCEDURE — 97016 VASOPNEUMATIC DEVICE THERAPY: CPT

## 2023-06-26 ENCOUNTER — HOSPITAL ENCOUNTER (OUTPATIENT)
Facility: HOSPITAL | Age: 73
Setting detail: RECURRING SERIES
Discharge: HOME | End: 2023-06-29
Payer: MEDICARE

## 2023-06-26 PROCEDURE — 97140 MANUAL THERAPY 1/> REGIONS: CPT

## 2023-06-26 PROCEDURE — 97016 VASOPNEUMATIC DEVICE THERAPY: CPT

## 2023-06-26 PROCEDURE — 97110 THERAPEUTIC EXERCISES: CPT

## 2023-06-30 ENCOUNTER — HOSPITAL ENCOUNTER (OUTPATIENT)
Facility: HOSPITAL | Age: 73
Setting detail: RECURRING SERIES
End: 2023-06-30
Payer: MEDICARE

## 2023-06-30 PROCEDURE — 97140 MANUAL THERAPY 1/> REGIONS: CPT

## 2023-06-30 PROCEDURE — 97016 VASOPNEUMATIC DEVICE THERAPY: CPT

## 2023-06-30 PROCEDURE — 97110 THERAPEUTIC EXERCISES: CPT

## 2023-07-05 ENCOUNTER — HOSPITAL ENCOUNTER (OUTPATIENT)
Facility: HOSPITAL | Age: 73
Setting detail: RECURRING SERIES
Discharge: HOME | End: 2023-07-08
Payer: MEDICARE

## 2023-07-05 PROCEDURE — 97110 THERAPEUTIC EXERCISES: CPT

## 2023-07-05 PROCEDURE — 97140 MANUAL THERAPY 1/> REGIONS: CPT

## 2023-07-05 PROCEDURE — 97016 VASOPNEUMATIC DEVICE THERAPY: CPT

## 2023-07-05 NOTE — PROGRESS NOTES
PHYSICAL THERAPY - MEDICARE DAILY TREATMENT NOTE (updated 3/23)      Date: 2023          Patient Name:  Amey Krabbe :  1950   Medical   Diagnosis:  Left knee pain [M25.562] Treatment Diagnosis:  M25.562  LEFT KNEE PAIN    Referral Source:  Ja Priest MD Insurance:   Payor: MEDICARE / Plan: MEDICARE PART A AND B / Product Type: *No Product type* /                     Patient  verified yes     Visit #   Current  / Total 4 Med nec   Time   In / Out 425 P 520 P   Total Treatment Time 55   Total Timed Codes 45   1:1 Treatment Time 25      [facility] Totals Reminder:  bill using total billable   min of TIMED therapeutic procedures and modalities. 8-22 min = 1 unit; 23-37 min = 2 units; 38-52 min = 3 units; 53-67 min = 4 units; 68-82 min = 5 units            SUBJECTIVE    Pain Level (0-10 scale): \"tight\"    Any medication changes, allergies to medications, adverse drug reactions, diagnosis change, or new procedure performed?: [x] No    [] Yes (see summary sheet for update)  Medications: Verified on Patient Summary List    Subjective functional status/changes:     Pt reports that her knee is doing very well; no pain-- just some \"pulling\" (points to distal quad). She was able to walk for ~1.5 miles 3x this past weekend; also went to the gym a few times and rode the stationary bike for ~15 min    OBJECTIVE      Therapeutic Procedures: Tx Min Billable or 1:1 Min (if diff from Tx Min) Procedure, Rationale, Specifics   35 15 17970 Therapeutic Exercise (timed):  increase ROM, strength, coordination, balance, and proprioception to improve patient's ability to progress to PLOF and address remaining functional goals. (see flow sheet as applicable)     Details if applicable:     10 10 96478 Manual Therapy (timed):  decrease pain, increase ROM, and increase tissue extensibility to improve patient's ability to progress to PLOF and address remaining functional goals.   The manual therapy interventions were

## 2023-07-07 ENCOUNTER — HOSPITAL ENCOUNTER (OUTPATIENT)
Facility: HOSPITAL | Age: 73
Setting detail: RECURRING SERIES
Discharge: HOME | End: 2023-07-10
Payer: MEDICARE

## 2023-07-07 PROCEDURE — 97140 MANUAL THERAPY 1/> REGIONS: CPT

## 2023-07-07 PROCEDURE — 97016 VASOPNEUMATIC DEVICE THERAPY: CPT

## 2023-07-07 PROCEDURE — 97110 THERAPEUTIC EXERCISES: CPT

## 2023-07-10 ENCOUNTER — HOSPITAL ENCOUNTER (OUTPATIENT)
Facility: HOSPITAL | Age: 73
Setting detail: RECURRING SERIES
Discharge: HOME | End: 2023-07-13
Payer: MEDICARE

## 2023-07-10 PROCEDURE — 97110 THERAPEUTIC EXERCISES: CPT

## 2023-07-10 PROCEDURE — 97140 MANUAL THERAPY 1/> REGIONS: CPT

## 2023-07-10 NOTE — PROGRESS NOTES
PHYSICAL THERAPY - MEDICARE DAILY TREATMENT NOTE (updated 3/23)      Date: 7/10/2023          Patient Name:  Elan Avendano :  1950   Medical   Diagnosis:  Left knee pain [M25.562] Treatment Diagnosis:  M25.562  LEFT KNEE PAIN    Referral Source:  Brynn Felix MD Insurance:   Payor: MEDICARE / Plan: MEDICARE PART A AND B / Product Type: *No Product type* /                     Patient  verified yes     Visit #   Current  / Total 6 Med nec   Time   In / Out 1030 am 11:25 A   Total Treatment Time 55   Total Timed Codes 45   1:1 Treatment Time 35      [facility] Totals Reminder:  bill using total billable   min of TIMED therapeutic procedures and modalities. 8-22 min = 1 unit; 23-37 min = 2 units; 38-52 min = 3 units; 53-67 min = 4 units; 68-82 min = 5 units            SUBJECTIVE    Pain Level (0-10 scale): 0    Any medication changes, allergies to medications, adverse drug reactions, diagnosis change, or new procedure performed?: [x] No    [] Yes (see summary sheet for update)  Medications: Verified on Patient Summary List    Subjective functional status/changes:     Pt reports that she's feeling good and has been going to gym to perform exercises and ride bike. OBJECTIVE      Therapeutic Procedures: Tx Min Billable or 1:1 Min (if diff from Tx Min) Procedure, Rationale, Specifics   30 20 04748 Therapeutic Exercise (timed):  increase ROM, strength, coordination, balance, and proprioception to improve patient's ability to progress to PLOF and address remaining functional goals. (see flow sheet as applicable)     Details if applicable:     15 15 93900 Manual Therapy (timed):  decrease pain, increase ROM, and increase tissue extensibility to improve patient's ability to progress to PLOF and address remaining functional goals. The manual therapy interventions were performed at a separate and distinct time from the therapeutic activities interventions .  (see flow sheet as applicable)     Details if

## 2023-07-12 ENCOUNTER — HOSPITAL ENCOUNTER (OUTPATIENT)
Facility: HOSPITAL | Age: 73
Setting detail: RECURRING SERIES
Discharge: HOME | End: 2023-07-15
Payer: MEDICARE

## 2023-07-12 PROCEDURE — 97140 MANUAL THERAPY 1/> REGIONS: CPT

## 2023-07-12 PROCEDURE — 97110 THERAPEUTIC EXERCISES: CPT

## 2023-07-12 NOTE — PROGRESS NOTES
PHYSICAL THERAPY - MEDICARE DAILY TREATMENT/progress NOTE (updated 3/23)      Date: 2023          Patient Name:  Kwan Yates :  1950   Medical   Diagnosis:  Left knee pain [M25.562] Treatment Diagnosis:  M25.562  LEFT KNEE PAIN    Referral Source:  Mehnaz Monique MD Insurance:   Payor: MEDICARE / Plan: MEDICARE PART A AND B / Product Type: *No Product type* /                     Patient  verified yes     Visit #   Current  / Total 7 Med nec   Time   In / Out 1000 am 10:55 A   Total Treatment Time 55   Total Timed Codes 45   1:1 Treatment Time 30      [facility] Totals Reminder:  bill using total billable   min of TIMED therapeutic procedures and modalities. 8-22 min = 1 unit; 23-37 min = 2 units; 38-52 min = 3 units; 53-67 min = 4 units; 68-82 min = 5 units            SUBJECTIVE    Pain Level (0-10 scale): 0    Any medication changes, allergies to medications, adverse drug reactions, diagnosis change, or new procedure performed?: [x] No    [] Yes (see summary sheet for update)  Medications: Verified on Patient Summary List    Subjective functional status/changes:     Pt doing well. She went to the gym yesterday-- rode stationary bike, walked on treadmill and did leg press machine  MD appointment on Monday,     OBJECTIVE      Therapeutic Procedures: Tx Min Billable or 1:1 Min (if diff from Tx Min) Procedure, Rationale, Specifics   30 15 78020 Therapeutic Exercise (timed):  increase ROM, strength, coordination, balance, and proprioception to improve patient's ability to progress to PLOF and address remaining functional goals. (see flow sheet as applicable)     Details if applicable:     15 75 32996 Manual Therapy (timed):  decrease pain, increase ROM, and increase tissue extensibility to improve patient's ability to progress to PLOF and address remaining functional goals.   The manual therapy interventions were performed at a separate and distinct time from the therapeutic activities

## 2023-07-18 ENCOUNTER — HOSPITAL ENCOUNTER (OUTPATIENT)
Facility: HOSPITAL | Age: 73
Setting detail: RECURRING SERIES
Discharge: HOME | End: 2023-07-21
Payer: MEDICARE

## 2023-07-18 PROCEDURE — 97110 THERAPEUTIC EXERCISES: CPT

## 2023-07-18 PROCEDURE — 97140 MANUAL THERAPY 1/> REGIONS: CPT

## 2023-07-18 NOTE — PROGRESS NOTES
address ROM deficits, analyze and address strength deficits, analyze and address soft tissue restrictions, and analyze and cue for proper movement patterns to address functional deficits and attain remaining goals. Progress toward goals / Updated goals:  []  See Progress Note/Recertification  Short Term Goals: To be accomplished in 3-4 weeks  1) Pt will be I in initial HEP met  2) Pt will dem >/=105 deg PROM L knee flex in order to assist with transfers met  3) Pt will report compliance with riding stationary bike at gym >/=2x/wk met     Long Term Goals:  To be accomplished in 10-12 weeks progressing  1) Pt will demonstrate >/=120 deg L knee flex in order to assist with ADL's  2) Pt will ascend/descend flight of stairs with reciprocal gait pattern  3) Pt will perform L SLS for >/=10 sec without hip drop in order to dem improvement in proprioception  4) Pt will be I in final HEP      PLAN  Yes  Continue plan of care  Re-Cert Due: 2/82/15  []  Upgrade activities as tolerated  []  Discharge due to :  []  Other:      Harika Patel, PT       7/18/2023       10:41 AM

## 2023-07-21 ENCOUNTER — APPOINTMENT (OUTPATIENT)
Facility: HOSPITAL | Age: 73
End: 2023-07-21
Payer: MEDICARE

## 2023-07-25 ENCOUNTER — APPOINTMENT (OUTPATIENT)
Facility: HOSPITAL | Age: 73
End: 2023-07-25
Payer: MEDICARE

## 2023-07-27 ENCOUNTER — HOSPITAL ENCOUNTER (OUTPATIENT)
Facility: HOSPITAL | Age: 73
Setting detail: RECURRING SERIES
Discharge: HOME | End: 2023-07-30
Payer: MEDICARE

## 2023-07-27 PROCEDURE — 97140 MANUAL THERAPY 1/> REGIONS: CPT

## 2023-07-27 PROCEDURE — 97110 THERAPEUTIC EXERCISES: CPT

## 2023-07-27 NOTE — PROGRESS NOTES
PHYSICAL THERAPY - MEDICARE DAILY TREATMENT NOTE (updated 3/23)      Date: 2023          Patient Name:  Sara Ardon :  1950   Medical   Diagnosis:  Left knee pain [M25.562] Treatment Diagnosis:  M25.562  LEFT KNEE PAIN    Referral Source:  Tatyana Chirinos MD Insurance:   Payor: MEDICARE / Plan: MEDICARE PART A AND B / Product Type: *No Product type* /                     Patient  verified yes     Visit #   Current  / Total 9 Med nec   Time   In / Out 725 A 8:10 A   Total Treatment Time 45   Total Timed Codes 45   1:1 Treatment Time 40      [facility] Totals Reminder:  bill using total billable   min of TIMED therapeutic procedures and modalities. 8-22 min = 1 unit; 23-37 min = 2 units; 38-52 min = 3 units; 53-67 min = 4 units; 68-82 min = 5 units            SUBJECTIVE    Pain Level (0-10 scale): 0    Any medication changes, allergies to medications, adverse drug reactions, diagnosis change, or new procedure performed?: [x] No    [] Yes (see summary sheet for update)  Medications: Verified on Patient Summary List    Subjective functional status/changes:     Pt doing well. She went for 2 mile walk yesterday with her son. She has been riding the bike every day except one since last PT visit. OBJECTIVE      Therapeutic Procedures: Tx Min Billable or 1:1 Min (if diff from Tx Min) Procedure, Rationale, Specifics   35 67 64109 Therapeutic Exercise (timed):  increase ROM, strength, coordination, balance, and proprioception to improve patient's ability to progress to PLOF and address remaining functional goals. (see flow sheet as applicable)     Details if applicable:     10 10 10509 Manual Therapy (timed):  decrease pain, increase ROM, and increase tissue extensibility to improve patient's ability to progress to PLOF and address remaining functional goals. The manual therapy interventions were performed at a separate and distinct time from the therapeutic activities interventions .  (see flow sheet

## 2023-08-01 ENCOUNTER — APPOINTMENT (OUTPATIENT)
Facility: HOSPITAL | Age: 73
End: 2023-08-01
Payer: MEDICARE

## 2023-08-03 ENCOUNTER — HOSPITAL ENCOUNTER (OUTPATIENT)
Facility: HOSPITAL | Age: 73
Setting detail: RECURRING SERIES
Discharge: HOME | End: 2023-08-06
Payer: MEDICARE

## 2023-08-03 PROCEDURE — 97140 MANUAL THERAPY 1/> REGIONS: CPT

## 2023-08-03 PROCEDURE — 97110 THERAPEUTIC EXERCISES: CPT

## 2023-08-03 NOTE — PROGRESS NOTES
PHYSICAL THERAPY - MEDICARE DAILY TREATMENT NOTE (updated 3/23)      Date: 8/3/2023          Patient Name:  Radha Ryan :  1950   Medical   Diagnosis:  Left knee pain [M25.562] Treatment Diagnosis:  M25.562  LEFT KNEE PAIN    Referral Source:  Dashawn Ochoa MD Insurance:   Payor: MEDICARE / Plan: MEDICARE PART A AND B / Product Type: *No Product type* /                     Patient  verified yes     Visit #   Current  / Total 10 Med nec   Time   In / Out 730 A 8:05 A   Total Treatment Time 35   Total Timed Codes 35   1:1 Treatment Time 30      [facility] Totals Reminder:  bill using total billable   min of TIMED therapeutic procedures and modalities. 8-22 min = 1 unit; 23-37 min = 2 units; 38-52 min = 3 units; 53-67 min = 4 units; 68-82 min = 5 units            SUBJECTIVE    Pain Level (0-10 scale): 0    Any medication changes, allergies to medications, adverse drug reactions, diagnosis change, or new procedure performed?: [x] No    [] Yes (see summary sheet for update)  Medications: Verified on Patient Summary List    Subjective functional status/changes:     Pt reports she would like to d/c to HEP today. She has been going to [employer] daily. She denies any restrictions with ADL's. OBJECTIVE    ROM  AROM L Knee flexion: 104 deg  PROM L knee flex= 109 deg (pt seated edge of table)  L knee AROM ext= 0 deg     Strength (1-5)          MMT NT  L quad set: good. L QS with SLR: good, no extension lag                Functional Biomechanical Screen  Tandem stance airex: 30 sec ankle strategies noted                                                                            Joint Mobility:  L patellar mobs hypomobile compared to R    Therapeutic Procedures:   Tx Min Billable or 1:1 Min (if diff from Tx Min) Procedure, Rationale, Specifics   20  54554 Therapeutic Exercise (timed):  increase ROM, strength, coordination, balance, and proprioception to improve patient's ability to progress to PLOF and

## 2023-08-08 ENCOUNTER — APPOINTMENT (OUTPATIENT)
Facility: HOSPITAL | Age: 73
End: 2023-08-08
Payer: MEDICARE

## 2023-08-10 ENCOUNTER — APPOINTMENT (OUTPATIENT)
Facility: HOSPITAL | Age: 73
End: 2023-08-10
Payer: MEDICARE

## 2023-10-16 SDOH — ECONOMIC STABILITY: INCOME INSECURITY: HOW HARD IS IT FOR YOU TO PAY FOR THE VERY BASICS LIKE FOOD, HOUSING, MEDICAL CARE, AND HEATING?: NOT HARD AT ALL

## 2023-10-16 SDOH — ECONOMIC STABILITY: FOOD INSECURITY: WITHIN THE PAST 12 MONTHS, THE FOOD YOU BOUGHT JUST DIDN'T LAST AND YOU DIDN'T HAVE MONEY TO GET MORE.: NEVER TRUE

## 2023-10-16 SDOH — ECONOMIC STABILITY: HOUSING INSECURITY
IN THE LAST 12 MONTHS, WAS THERE A TIME WHEN YOU DID NOT HAVE A STEADY PLACE TO SLEEP OR SLEPT IN A SHELTER (INCLUDING NOW)?: NO

## 2023-10-16 SDOH — ECONOMIC STABILITY: FOOD INSECURITY: WITHIN THE PAST 12 MONTHS, YOU WORRIED THAT YOUR FOOD WOULD RUN OUT BEFORE YOU GOT MONEY TO BUY MORE.: NEVER TRUE

## 2023-10-16 ASSESSMENT — PATIENT HEALTH QUESTIONNAIRE - PHQ9
1. LITTLE INTEREST OR PLEASURE IN DOING THINGS: 0
2. FEELING DOWN, DEPRESSED OR HOPELESS: 0
SUM OF ALL RESPONSES TO PHQ QUESTIONS 1-9: 0
SUM OF ALL RESPONSES TO PHQ QUESTIONS 1-9: 0
SUM OF ALL RESPONSES TO PHQ9 QUESTIONS 1 & 2: 0
SUM OF ALL RESPONSES TO PHQ QUESTIONS 1-9: 0
SUM OF ALL RESPONSES TO PHQ QUESTIONS 1-9: 0

## 2023-10-17 ENCOUNTER — OFFICE VISIT (OUTPATIENT)
Age: 73
End: 2023-10-17
Payer: MEDICARE

## 2023-10-17 VITALS
DIASTOLIC BLOOD PRESSURE: 82 MMHG | HEIGHT: 55 IN | SYSTOLIC BLOOD PRESSURE: 144 MMHG | BODY MASS INDEX: 43.28 KG/M2 | RESPIRATION RATE: 16 BRPM | WEIGHT: 187 LBS | TEMPERATURE: 97.4 F | OXYGEN SATURATION: 99 % | HEART RATE: 63 BPM

## 2023-10-17 DIAGNOSIS — M85.80 OSTEOPENIA, UNSPECIFIED LOCATION: Primary | ICD-10-CM

## 2023-10-17 DIAGNOSIS — Z11.59 NEED FOR HEPATITIS C SCREENING TEST: ICD-10-CM

## 2023-10-17 DIAGNOSIS — E55.9 VITAMIN D DEFICIENCY: ICD-10-CM

## 2023-10-17 DIAGNOSIS — E66.01 CLASS 3 SEVERE OBESITY DUE TO EXCESS CALORIES WITHOUT SERIOUS COMORBIDITY WITH BODY MASS INDEX (BMI) OF 50.0 TO 59.9 IN ADULT (HCC): ICD-10-CM

## 2023-10-17 DIAGNOSIS — Z23 FLU VACCINE NEED: ICD-10-CM

## 2023-10-17 DIAGNOSIS — Z78.0 POSTMENOPAUSAL: ICD-10-CM

## 2023-10-17 LAB — 25(OH)D3 SERPL-MCNC: 53.4 NG/ML (ref 30–100)

## 2023-10-17 PROCEDURE — 90694 VACC AIIV4 NO PRSRV 0.5ML IM: CPT | Performed by: FAMILY MEDICINE

## 2023-10-17 PROCEDURE — G8399 PT W/DXA RESULTS DOCUMENT: HCPCS | Performed by: FAMILY MEDICINE

## 2023-10-17 PROCEDURE — 99214 OFFICE O/P EST MOD 30 MIN: CPT | Performed by: FAMILY MEDICINE

## 2023-10-17 PROCEDURE — 1090F PRES/ABSN URINE INCON ASSESS: CPT | Performed by: FAMILY MEDICINE

## 2023-10-17 PROCEDURE — 3078F DIAST BP <80 MM HG: CPT | Performed by: FAMILY MEDICINE

## 2023-10-17 PROCEDURE — 1123F ACP DISCUSS/DSCN MKR DOCD: CPT | Performed by: FAMILY MEDICINE

## 2023-10-17 PROCEDURE — G8484 FLU IMMUNIZE NO ADMIN: HCPCS | Performed by: FAMILY MEDICINE

## 2023-10-17 PROCEDURE — G8417 CALC BMI ABV UP PARAM F/U: HCPCS | Performed by: FAMILY MEDICINE

## 2023-10-17 PROCEDURE — PBSHW INFLUENZA, FLUAD, (AGE 65 Y+), IM, PF, 0.5 ML: Performed by: FAMILY MEDICINE

## 2023-10-17 PROCEDURE — 1036F TOBACCO NON-USER: CPT | Performed by: FAMILY MEDICINE

## 2023-10-17 PROCEDURE — G8427 DOCREV CUR MEDS BY ELIG CLIN: HCPCS | Performed by: FAMILY MEDICINE

## 2023-10-17 PROCEDURE — 3017F COLORECTAL CA SCREEN DOC REV: CPT | Performed by: FAMILY MEDICINE

## 2023-10-17 PROCEDURE — 3074F SYST BP LT 130 MM HG: CPT | Performed by: FAMILY MEDICINE

## 2023-10-17 ASSESSMENT — ENCOUNTER SYMPTOMS
VOMITING: 0
BACK PAIN: 0
NAUSEA: 0
COUGH: 0
SHORTNESS OF BREATH: 0

## 2023-10-17 NOTE — PROGRESS NOTES
Chief Complaint   Patient presents with    Hypertension    Follow-up Chronic Condition     1. \"Have you been to the ER, urgent care clinic since your last visit? Hospitalized since your last visit? \"  Surgery for knee replacement     2. \"Have you seen or consulted any other health care providers outside of the [address] [place] [address] since your last visit? \"  Ortho     Physical Therapy all done
patient is not nervous/anxious. Physical Exam  Vitals reviewed. Constitutional:       Appearance: Normal appearance. HENT:      Head: Normocephalic and atraumatic. Right Ear: Tympanic membrane, ear canal and external ear normal.      Left Ear: Tympanic membrane, ear canal and external ear normal.      Nose: Nose normal.      Mouth/Throat:      Mouth: Mucous membranes are moist.      Pharynx: Oropharynx is clear. Eyes:      Extraocular Movements: Extraocular movements intact. Conjunctiva/sclera: Conjunctivae normal.      Pupils: Pupils are equal, round, and reactive to light. Cardiovascular:      Rate and Rhythm: Normal rate and regular rhythm. Pulses: Normal pulses. Heart sounds: Normal heart sounds. Pulmonary:      Effort: Pulmonary effort is normal.      Breath sounds: Normal breath sounds. Abdominal:      General: Abdomen is flat. Bowel sounds are normal.      Palpations: Abdomen is soft. Musculoskeletal:         General: Normal range of motion. Cervical back: Normal range of motion and neck supple. Skin:     General: Skin is warm and dry. Neurological:      General: No focal deficit present. Mental Status: She is alert and oriented to person, place, and time. Psychiatric:         Mood and Affect: Mood normal.         Behavior: Behavior normal.         Judgment: Judgment normal.           ASSESSMENT and PLAN  Tamanna was seen today for hypertension and follow-up chronic condition. Diagnoses and all orders for this visit:    Osteopenia, unspecified location  Her last DEXA was in 2017 and showed osteopenia. I recommended getting an updated DEXA scan. -     DEXA BONE DENSITY AXIAL SKELETON; Future    Need for hepatitis C screening test  Will order with labs in 6 months. Body mass index (BMI) 50.0-59.9, adult [facility])  She has recovered from her knee surgery and plans to start getting back to her usual exercise routine.      Flu vaccine need  Given

## 2023-10-20 ENCOUNTER — HOSPITAL ENCOUNTER (OUTPATIENT)
Facility: HOSPITAL | Age: 73
End: 2023-10-20
Attending: FAMILY MEDICINE
Payer: MEDICARE

## 2023-10-20 DIAGNOSIS — M85.80 OSTEOPENIA, UNSPECIFIED LOCATION: ICD-10-CM

## 2023-10-20 DIAGNOSIS — Z78.0 POSTMENOPAUSAL: ICD-10-CM

## 2023-10-20 PROCEDURE — 77080 DXA BONE DENSITY AXIAL: CPT

## 2024-02-29 ENCOUNTER — HOSPITAL ENCOUNTER (OUTPATIENT)
Facility: HOSPITAL | Age: 74
Setting detail: RECURRING SERIES
End: 2024-02-29
Payer: MEDICARE

## 2024-02-29 PROCEDURE — 97110 THERAPEUTIC EXERCISES: CPT

## 2024-02-29 PROCEDURE — 97161 PT EVAL LOW COMPLEX 20 MIN: CPT

## 2024-02-29 NOTE — THERAPY EVALUATION
Physical Therapy at [facility],   a part of [facility]  [address] [facility]  [place], [place] [address]  Phone:[phone] Fax:[phone]                                                                            PHYSICAL THERAPY - MEDICARE EVALUATION/PLAN OF CARE NOTE (updated 3/23)      Date: 2024          Patient Name:  Tamanna Denis :  1950   Medical   Diagnosis:  Right knee pain [M25.561] Treatment Diagnosis:  M25.561  RIGHT KNEE PAIN    Referral Source:  Efrain Ritter P* Provider #:  [id number]                  Insurance: Payor: MEDICARE / Plan: MEDICARE PART A AND B / Product Type: *No Product type* /      Patient  verified yes     Visit #   Current  / Total 1 24   Time   In / Out 1230 P 1:20 P   Total Treatment Time 50   Total Timed Codes 15   1:1 Treatment Time 15      [facility] Totals Reminder:  bill using total billable   min of TIMED therapeutic procedures and modalities.   8-22 min = 1 unit; 23-37 min = 2 units; 38-52 min = 3 units;  53-67 min = 4 units; 68-82 min = 5 units           SUBJECTIVE  Pain Level (0-10 scale): 3/10 today  []constant []intermittent [x]improving []worsening []no change since onset    Any medication changes, allergies to medications, adverse drug reactions, diagnosis change, or new procedure performed?: [x] No    [] Yes (see summary sheet for update)  Medications: Verified on Patient Summary List    Subjective functional status/changes:     Pt presents with referral for R knee OA. In Dec she got up from a chair and had sharp, high intensity R knee pain. Went to ortho MD- given a cortisone injection and high dose anti-inflammatories since she was about to leave for a trip to [place]. Unable to take meds due to stomach discomfort. Her knee was in a lot of pain in [place], swollen. She went back to [facility] and got a gel shot on 24.   States that her symptoms are a little better, \"it was a 10/10 every day\". She has difficulty

## 2024-03-12 ENCOUNTER — HOSPITAL ENCOUNTER (OUTPATIENT)
Facility: HOSPITAL | Age: 74
Setting detail: RECURRING SERIES
Discharge: HOME | End: 2024-03-15
Payer: MEDICARE

## 2024-03-12 PROCEDURE — 97110 THERAPEUTIC EXERCISES: CPT

## 2024-03-12 PROCEDURE — 97140 MANUAL THERAPY 1/> REGIONS: CPT

## 2024-03-12 NOTE — PROGRESS NOTES
movement patterns to address functional deficits and attain remaining goals.    Progress toward goals / Updated goals:  []  See Progress Note/Recertification  NT      PLAN  Yes  Continue plan of care  Re-Cert Due: 5/25/24  []  Upgrade activities as tolerated  []  Discharge due to :  []  Other:      Sarah Clark, PT       3/12/2024       11:18 AM

## 2024-03-19 ENCOUNTER — HOSPITAL ENCOUNTER (OUTPATIENT)
Facility: HOSPITAL | Age: 74
Setting detail: RECURRING SERIES
Discharge: HOME | End: 2024-03-22
Payer: MEDICARE

## 2024-03-19 PROCEDURE — 97110 THERAPEUTIC EXERCISES: CPT

## 2024-03-19 PROCEDURE — 97140 MANUAL THERAPY 1/> REGIONS: CPT

## 2024-03-19 NOTE — PROGRESS NOTES
PHYSICAL THERAPY - MEDICARE DAILY TREATMENT NOTE (updated 3/23)      Date: 3/19/2024          Patient Name:  Tamanna Denis :  1950   Medical   Diagnosis:  Right knee pain [M25.561] Treatment Diagnosis:  M25.561  RIGHT KNEE PAIN    Referral Source:  Efrain Ritter P* Insurance:   Payor: MEDICARE / Plan: MEDICARE PART A AND B / Product Type: *No Product type* /                     Patient  verified yes     Visit #   Current  / Total 3 24   Time   In / Out 10:55 A 1135 A   Total Treatment Time 40   Total Timed Codes 40   1:1 Treatment Time 40      [facility] Totals Reminder:  bill using total billable   min of TIMED therapeutic procedures and modalities.   8-22 min = 1 unit; 23-37 min = 2 units; 38-52 min = 3 units; 53-67 min = 4 units; 68-82 min = 5 units            SUBJECTIVE    Pain Level (0-10 scale): 0    Any medication changes, allergies to medications, adverse drug reactions, diagnosis change, or new procedure performed?: [x] No    [] Yes (see summary sheet for update)  Medications: Verified on Patient Summary List    Subjective functional status/changes:     She feels better when she goes to the gym first thing in the AM  She is able to sleep on her R side now without symptoms    OBJECTIVE      Therapeutic Procedures:  Tx Min Billable or 1:1 Min (if diff from Tx Min) Procedure, Rationale, Specifics   30  09309 Therapeutic Exercise (timed):  increase ROM, strength, coordination, balance, and proprioception to improve patient's ability to progress to PLOF and address remaining functional goals. (see flow sheet as applicable)     Details if applicable:     10  14315 Manual Therapy (timed):  decrease pain and increase tissue extensibility to improve patient's ability to progress to PLOF and address remaining functional goals.  The manual therapy interventions were performed at a separate and distinct time from the therapeutic activities interventions . (see flow sheet as applicable)     Details if

## 2024-03-26 ENCOUNTER — HOSPITAL ENCOUNTER (OUTPATIENT)
Facility: HOSPITAL | Age: 74
Setting detail: RECURRING SERIES
Discharge: HOME | End: 2024-03-29
Payer: MEDICARE

## 2024-03-26 PROCEDURE — 97110 THERAPEUTIC EXERCISES: CPT

## 2024-03-26 NOTE — PROGRESS NOTES
PHYSICAL THERAPY - MEDICARE DAILY TREATMENT/progress and DC NOTE (updated 3/23)      Date: 3/26/2024          Patient Name:  Tamanna Denis :  1950   Medical   Diagnosis:  Right knee pain [M25.561] Treatment Diagnosis:  M25.561  RIGHT KNEE PAIN    Referral Source:  Efrain Ritter P* Insurance:   Payor: MEDICARE / Plan: MEDICARE PART A AND B / Product Type: *No Product type* /                     Patient  verified yes     Visit #   Current  / Total 4 24   Time   In / Out 10:55 A 11:25 A   Total Treatment Time 30   Total Timed Codes 30   1:1 Treatment Time 30      [facility] Totals Reminder:  bill using total billable   min of TIMED therapeutic procedures and modalities.   8-22 min = 1 unit; 23-37 min = 2 units; 38-52 min = 3 units; 53-67 min = 4 units; 68-82 min = 5 units            SUBJECTIVE    Pain Level (0-10 scale): 0    Any medication changes, allergies to medications, adverse drug reactions, diagnosis change, or new procedure performed?: [x] No    [] Yes (see summary sheet for update)  Medications: Verified on Patient Summary List    Subjective functional status/changes:     Pt doing very well. She is back to the gym, her knee is not bothering her. Compliant and independent with HEP    OBJECTIVE      Therapeutic Procedures:  Tx Min Billable or 1:1 Min (if diff from Tx Min) Procedure, Rationale, Specifics   30  12832 Therapeutic Exercise (timed):  increase ROM, strength, coordination, balance, and proprioception to improve patient's ability to progress to PLOF and address remaining functional goals. (see flow sheet as applicable)     Details if applicable:       74612 Manual Therapy (timed):  decrease pain and increase tissue extensibility to improve patient's ability to progress to PLOF and address remaining functional goals.  The manual therapy interventions were performed at a separate and distinct time from the therapeutic activities interventions . (see flow sheet as applicable)     Details

## 2024-03-26 NOTE — THERAPY DISCHARGE
Physical Therapy at [facility],   a part of [facility]  [address] [address]  Phone: [phone]  Fax: [phone]     DISCHARGE SUMMARY  Patient Name: Tamanna Denis : 1950   Treatment/Medical Diagnosis: Right knee pain [M25.561]   Referral Source: Efrain Ritter P*     Date of Initial Visit: 24 Attended Visits: 4 Missed Visits: 0     SUMMARY OF TREATMENT  R knee AROM WNL without symptoms  R QS with SLR 3x10 without ext lag     Pain Level at end of session (0-10 scale): \"good\"        Assessment   Pt has completed 4 skilled PT visits. She has returned to gym routine and ADL's, daily chores without R knee pain/symptoms. She is independent with HEP and is ready for DC to [facility] at this time.     Short Term Goals:   1) Pt will be I in initial HEP met  2) Pt will ambulate >/=100 ft in clinic with proper gait mechanics met  3) Pt will ascend/descend stairs with proper sequencing without cueing progressing     Long Term Goals:   1) Pt will dem >/=0 deg AROM R knee ext in order to assist with gait mechanics met  2) Pt will dem >/=105 AROM R knee flex in order to assist with ADL's met  3) Pt will perform R QS with SLR x10 reps without ext lag met  4) Pt will be able to ride bike at gym for >/=20 min without inc'd symptoms in order to return to exercise routine met        PLAN  [x]  Discharge due to : met/progressing toward set PT goals; independent in HEP        Sarah Clark, PT       3/26/2024       11:43 AM    If you have any questions/comments please contact us directly at [phone].   Thank you for allowing us to assist in the care of your patient.

## 2024-04-22 ENCOUNTER — OFFICE VISIT (OUTPATIENT)
Age: 74
End: 2024-04-22
Payer: MEDICARE

## 2024-04-22 VITALS
RESPIRATION RATE: 16 BRPM | OXYGEN SATURATION: 99 % | HEART RATE: 75 BPM | DIASTOLIC BLOOD PRESSURE: 56 MMHG | SYSTOLIC BLOOD PRESSURE: 136 MMHG | BODY MASS INDEX: 44.2 KG/M2 | TEMPERATURE: 97.4 F | HEIGHT: 55 IN | WEIGHT: 191 LBS

## 2024-04-22 DIAGNOSIS — M17.11 LOCALIZED OSTEOARTHRITIS OF RIGHT KNEE: ICD-10-CM

## 2024-04-22 DIAGNOSIS — J45.20 MILD INTERMITTENT ASTHMA WITHOUT COMPLICATION: ICD-10-CM

## 2024-04-22 DIAGNOSIS — I10 PRIMARY HYPERTENSION: Primary | ICD-10-CM

## 2024-04-22 DIAGNOSIS — D68.51 ACTIVATED PROTEIN C RESISTANCE (HCC): ICD-10-CM

## 2024-04-22 DIAGNOSIS — Z11.59 NEED FOR HEPATITIS C SCREENING TEST: ICD-10-CM

## 2024-04-22 DIAGNOSIS — R60.0 BILATERAL LOWER EXTREMITY EDEMA: ICD-10-CM

## 2024-04-22 DIAGNOSIS — Z00.00 ENCOUNTER FOR SUBSEQUENT ANNUAL WELLNESS VISIT (AWV) IN MEDICARE PATIENT: ICD-10-CM

## 2024-04-22 PROCEDURE — 99214 OFFICE O/P EST MOD 30 MIN: CPT | Performed by: FAMILY MEDICINE

## 2024-04-22 PROCEDURE — 1123F ACP DISCUSS/DSCN MKR DOCD: CPT | Performed by: FAMILY MEDICINE

## 2024-04-22 PROCEDURE — 1090F PRES/ABSN URINE INCON ASSESS: CPT | Performed by: FAMILY MEDICINE

## 2024-04-22 PROCEDURE — G0439 PPPS, SUBSEQ VISIT: HCPCS | Performed by: FAMILY MEDICINE

## 2024-04-22 PROCEDURE — 1036F TOBACCO NON-USER: CPT | Performed by: FAMILY MEDICINE

## 2024-04-22 PROCEDURE — 3075F SYST BP GE 130 - 139MM HG: CPT | Performed by: FAMILY MEDICINE

## 2024-04-22 PROCEDURE — G8427 DOCREV CUR MEDS BY ELIG CLIN: HCPCS | Performed by: FAMILY MEDICINE

## 2024-04-22 PROCEDURE — 3017F COLORECTAL CA SCREEN DOC REV: CPT | Performed by: FAMILY MEDICINE

## 2024-04-22 PROCEDURE — G8399 PT W/DXA RESULTS DOCUMENT: HCPCS | Performed by: FAMILY MEDICINE

## 2024-04-22 PROCEDURE — 3078F DIAST BP <80 MM HG: CPT | Performed by: FAMILY MEDICINE

## 2024-04-22 PROCEDURE — G8417 CALC BMI ABV UP PARAM F/U: HCPCS | Performed by: FAMILY MEDICINE

## 2024-04-22 SDOH — ECONOMIC STABILITY: FOOD INSECURITY: WITHIN THE PAST 12 MONTHS, YOU WORRIED THAT YOUR FOOD WOULD RUN OUT BEFORE YOU GOT MONEY TO BUY MORE.: NEVER TRUE

## 2024-04-22 SDOH — ECONOMIC STABILITY: INCOME INSECURITY: HOW HARD IS IT FOR YOU TO PAY FOR THE VERY BASICS LIKE FOOD, HOUSING, MEDICAL CARE, AND HEATING?: NOT VERY HARD

## 2024-04-22 SDOH — ECONOMIC STABILITY: FOOD INSECURITY: WITHIN THE PAST 12 MONTHS, THE FOOD YOU BOUGHT JUST DIDN'T LAST AND YOU DIDN'T HAVE MONEY TO GET MORE.: NEVER TRUE

## 2024-04-22 ASSESSMENT — PATIENT HEALTH QUESTIONNAIRE - PHQ9
1. LITTLE INTEREST OR PLEASURE IN DOING THINGS: NOT AT ALL
SUM OF ALL RESPONSES TO PHQ QUESTIONS 1-9: 0
SUM OF ALL RESPONSES TO PHQ QUESTIONS 1-9: 0
2. FEELING DOWN, DEPRESSED OR HOPELESS: NOT AT ALL
SUM OF ALL RESPONSES TO PHQ QUESTIONS 1-9: 0
SUM OF ALL RESPONSES TO PHQ9 QUESTIONS 1 & 2: 0
SUM OF ALL RESPONSES TO PHQ QUESTIONS 1-9: 0

## 2024-04-22 ASSESSMENT — LIFESTYLE VARIABLES
HOW OFTEN DO YOU HAVE A DRINK CONTAINING ALCOHOL: NEVER
HOW MANY STANDARD DRINKS CONTAINING ALCOHOL DO YOU HAVE ON A TYPICAL DAY: PATIENT DOES NOT DRINK

## 2024-04-22 ASSESSMENT — ENCOUNTER SYMPTOMS
NAUSEA: 0
SHORTNESS OF BREATH: 0
COUGH: 0
VOMITING: 0
BACK PAIN: 0

## 2024-04-22 NOTE — PROGRESS NOTES
Chief Complaint   Patient presents with    Medicare AWV     \"Have you been to the ER, urgent care clinic since your last visit?  Hospitalized since your last visit?\"    no    “Have you seen or consulted any other health care providers outside of [facility] since your last visit?”      Ortho - right knee - gel shot given - may require surgery    Physical Therapy           Click Here for Release of Records Request    
HPI  Tamanna Denis 74 y.o. female  presents to the office today for AWV and follow up on chronic conditions.     Blood pressure (!) 136/56, pulse 75, temperature 97.4 °F (36.3 °C), temperature source Temporal, resp. rate 16, height 1.219 m (4'), weight 86.6 kg (191 lb), SpO2 99 %. Body mass index is 58.28 kg/m².   Chief Complaint   Patient presents with    Medicare AWV      She is overall doing well.     She reports her weight fluctuates up and down. She is not interested in seeing someone for weight loss management. She reports it's too expensive and she would rather work on weight loss on her own. She has seen Dr. Finley for weight loss in the past. She goes to the gym for 1.5-2 hours 5x/week.     Pt had her left knee replaced with Dr. Magaña (ortho) on 6/7/23. She has some pain of right knee. She has had injections which helped, but would like to eventually get the right knee replaced.     Hypertension: BP at office today 136/56. She doesn't take anything for hypertension. She has noticed some swelling in lower legs.     Vitamin D deficiency: Pt's vitamin D levels were 53.4 on 10/17/23. She doesn't take a vitamin D supplement. She gets at least 15 minutes of sunlight daily.     Current Outpatient Medications   Medication Sig Dispense Refill    montelukast (SINGULAIR) 10 MG tablet Take 1 tab po daily prn      solifenacin (VESICARE) 10 MG tablet Take 1 tablet by mouth at bedtime       No current facility-administered medications for this visit.     Allergies   Allergen Reactions    Sulfasalazine Hives    Clindamycin Hives    Sulfa Antibiotics Hives     Past Medical History:   Diagnosis Date    Anesthesia complication     PT STATES SHE HAD AN ADVERSE REACTION TO A SPINAL 40 YEARS AGO    AR (allergic rhinitis) 2/19/2010    Asthma     NO CURRENT TREATMENT    DJD (degenerative joint disease) of knee 2/19/2010    Factor V Leiden mutation (HCC) 2/19/2010    NO CURRENT TREATMENT    HTN (hypertension) 2/19/2010    
(191 lb)   Height: 1.219 m (4')      Body mass index is 58.28 kg/m².             Allergies   Allergen Reactions    Sulfasalazine Hives    Clindamycin Hives    Sulfa Antibiotics Hives     Prior to Visit Medications    Medication Sig Taking? Authorizing Provider   montelukast (SINGULAIR) 10 MG tablet Take 1 tab po daily prn Yes Automatic Reconciliation, Ar   solifenacin (VESICARE) 10 MG tablet Take 1 tablet by mouth at bedtime Yes Automatic Reconciliation, Ar       CareTeam (Including outside providers/suppliers regularly involved in providing care):   Patient Care Team:  Alvarez Alva MD as PCP - General  Alvarez Alva MD as PCP - Empaneled Provider  Susana Markham MD as Physician     Reviewed and updated this visit:  Tobacco  Allergies  Meds  Problems  Med Hx  Surg Hx  Soc Hx  Fam Hx

## 2024-04-23 LAB
ALBUMIN SERPL-MCNC: 4 G/DL (ref 3.5–5)
ALBUMIN/GLOB SERPL: 1.3 (ref 1.1–2.2)
ALP SERPL-CCNC: 95 U/L (ref 45–117)
ALT SERPL-CCNC: 22 U/L (ref 12–78)
ANION GAP SERPL CALC-SCNC: 1 MMOL/L (ref 5–15)
AST SERPL-CCNC: 18 U/L (ref 15–37)
BILIRUB SERPL-MCNC: 0.4 MG/DL (ref 0.2–1)
BUN SERPL-MCNC: 16 MG/DL (ref 6–20)
BUN/CREAT SERPL: 24 (ref 12–20)
CALCIUM SERPL-MCNC: 8.8 MG/DL (ref 8.5–10.1)
CHLORIDE SERPL-SCNC: 111 MMOL/L (ref 97–108)
CHOLEST SERPL-MCNC: 188 MG/DL
CO2 SERPL-SCNC: 28 MMOL/L (ref 21–32)
CREAT SERPL-MCNC: 0.68 MG/DL (ref 0.55–1.02)
ERYTHROCYTE [DISTWIDTH] IN BLOOD BY AUTOMATED COUNT: 12.9 % (ref 11.5–14.5)
GLOBULIN SER CALC-MCNC: 3.1 G/DL (ref 2–4)
GLUCOSE SERPL-MCNC: 91 MG/DL (ref 65–100)
HCT VFR BLD AUTO: 45.2 % (ref 35–47)
HCV AB SER IA-ACNC: 0.05 INDEX
HCV AB SERPL QL IA: NONREACTIVE
HDLC SERPL-MCNC: 65 MG/DL
HDLC SERPL: 2.9 (ref 0–5)
HGB BLD-MCNC: 14.8 G/DL (ref 11.5–16)
LDLC SERPL CALC-MCNC: 106.8 MG/DL (ref 0–100)
MCH RBC QN AUTO: 31.7 PG (ref 26–34)
MCHC RBC AUTO-ENTMCNC: 32.7 G/DL (ref 30–36.5)
MCV RBC AUTO: 96.8 FL (ref 80–99)
NRBC # BLD: 0 K/UL (ref 0–0.01)
NRBC BLD-RTO: 0 PER 100 WBC
PLATELET # BLD AUTO: 213 K/UL (ref 150–400)
PMV BLD AUTO: 12.1 FL (ref 8.9–12.9)
POTASSIUM SERPL-SCNC: 4.3 MMOL/L (ref 3.5–5.1)
PROT SERPL-MCNC: 7.1 G/DL (ref 6.4–8.2)
RBC # BLD AUTO: 4.67 M/UL (ref 3.8–5.2)
SODIUM SERPL-SCNC: 140 MMOL/L (ref 136–145)
TRIGL SERPL-MCNC: 81 MG/DL
VLDLC SERPL CALC-MCNC: 16.2 MG/DL
WBC # BLD AUTO: 4.9 K/UL (ref 3.6–11)

## 2024-10-21 NOTE — PROGRESS NOTES
HPI  Tamanna Denis 74 y.o. female  presents to the office today for follow up on chronic conditions.     Blood pressure 138/76, pulse 79, temperature 98.5 °F (36.9 °C), resp. rate 16, height 1.524 m (5'), weight 90.3 kg (199 lb), SpO2 96%. Body mass index is 38.86 kg/m².   Chief Complaint   Patient presents with    6 Month Follow-Up     She is overall doing well. She and her  are walking for exercise. She reports her allergies weren't bad this year. She doesn't take Singulair as often and now sometimes takes Benadryl as needed which works well for her.     Her BMI today is 38.86kg/m2. Her weight today is 199 lb, up from 191 lb on 4/22/24.  She is working on weight loss. She joined [employer]. She walks a lot and goes to the gym to use the recumbent bike. She reports her weight tends to fluctuate up and down. She has arthritis in knees which limits her ability to exercise. She has previously had injections to help with her knee pain and is trying to hold off from getting knee surgery.     Health Maintenance:   Flu Shot: today 10/22/24  COVID Booster: advised to get   RSV Vaccine: advised to get     Current Outpatient Medications   Medication Sig Dispense Refill    nabumetone (RELAFEN) 750 MG tablet Take 1 tablet by mouth 2 times daily      montelukast (SINGULAIR) 10 MG tablet Take 1 tab po daily prn      solifenacin (VESICARE) 10 MG tablet Take 1 tablet by mouth at bedtime (Patient not taking: Reported on 10/22/2024)       No current facility-administered medications for this visit.     Allergies   Allergen Reactions    Sulfa Antibiotics Hives    Sulfasalazine Hives    Clindamycin Hives    Codeine      Other Reaction(s): vomiting     Past Medical History:   Diagnosis Date    Anesthesia complication     PT STATES SHE HAD AN ADVERSE REACTION TO A SPINAL 40 YEARS AGO    AR (allergic rhinitis) 2/19/2010    Asthma     NO CURRENT TREATMENT    DJD (degenerative joint disease) of knee 2/19/2010    Factor V

## 2024-10-22 ENCOUNTER — CLINICAL DOCUMENTATION (OUTPATIENT)
Facility: HOSPITAL | Age: 74
End: 2024-10-22

## 2024-10-22 ENCOUNTER — OFFICE VISIT (OUTPATIENT)
Age: 74
End: 2024-10-22
Payer: MEDICARE

## 2024-10-22 VITALS
BODY MASS INDEX: 39.07 KG/M2 | TEMPERATURE: 98.5 F | DIASTOLIC BLOOD PRESSURE: 76 MMHG | WEIGHT: 199 LBS | HEIGHT: 60 IN | HEART RATE: 79 BPM | OXYGEN SATURATION: 96 % | RESPIRATION RATE: 16 BRPM | SYSTOLIC BLOOD PRESSURE: 138 MMHG

## 2024-10-22 DIAGNOSIS — I10 PRIMARY HYPERTENSION: ICD-10-CM

## 2024-10-22 DIAGNOSIS — M17.12 PRIMARY LOCALIZED OSTEOARTHRITIS OF LEFT KNEE: ICD-10-CM

## 2024-10-22 DIAGNOSIS — E66.01 SEVERE OBESITY: Primary | ICD-10-CM

## 2024-10-22 DIAGNOSIS — Z23 ENCOUNTER FOR ADMINISTRATION OF VACCINE: ICD-10-CM

## 2024-10-22 PROCEDURE — 3075F SYST BP GE 130 - 139MM HG: CPT | Performed by: FAMILY MEDICINE

## 2024-10-22 PROCEDURE — G8399 PT W/DXA RESULTS DOCUMENT: HCPCS | Performed by: FAMILY MEDICINE

## 2024-10-22 PROCEDURE — 90653 IIV ADJUVANT VACCINE IM: CPT | Performed by: FAMILY MEDICINE

## 2024-10-22 PROCEDURE — 1036F TOBACCO NON-USER: CPT | Performed by: FAMILY MEDICINE

## 2024-10-22 PROCEDURE — PBSHW INFLUENZA, FLUAD TRIVALENT, (AGE 65 Y+), IM, PRESERVATIVE FREE, 0.5ML: Performed by: FAMILY MEDICINE

## 2024-10-22 PROCEDURE — 3017F COLORECTAL CA SCREEN DOC REV: CPT | Performed by: FAMILY MEDICINE

## 2024-10-22 PROCEDURE — G8427 DOCREV CUR MEDS BY ELIG CLIN: HCPCS | Performed by: FAMILY MEDICINE

## 2024-10-22 PROCEDURE — 99214 OFFICE O/P EST MOD 30 MIN: CPT | Performed by: FAMILY MEDICINE

## 2024-10-22 PROCEDURE — G8417 CALC BMI ABV UP PARAM F/U: HCPCS | Performed by: FAMILY MEDICINE

## 2024-10-22 PROCEDURE — 3078F DIAST BP <80 MM HG: CPT | Performed by: FAMILY MEDICINE

## 2024-10-22 PROCEDURE — 1090F PRES/ABSN URINE INCON ASSESS: CPT | Performed by: FAMILY MEDICINE

## 2024-10-22 PROCEDURE — G8482 FLU IMMUNIZE ORDER/ADMIN: HCPCS | Performed by: FAMILY MEDICINE

## 2024-10-22 PROCEDURE — 1123F ACP DISCUSS/DSCN MKR DOCD: CPT | Performed by: FAMILY MEDICINE

## 2024-10-22 RX ORDER — TIRZEPATIDE 2.5 MG/.5ML
2.5 INJECTION, SOLUTION SUBCUTANEOUS WEEKLY
Qty: 2 ML | Refills: 0 | Status: SHIPPED | OUTPATIENT
Start: 2024-10-22

## 2024-10-22 ASSESSMENT — ENCOUNTER SYMPTOMS
BACK PAIN: 0
NAUSEA: 0
SHORTNESS OF BREATH: 0
VOMITING: 0
COUGH: 0

## 2024-10-22 NOTE — PROGRESS NOTES
[facility] at [facility]  Specialty Pharmacy Update    Date: 10/22/24    Tamanna Denis 1950    Medication: Zepbound 2.5 mg/0.5 ml    This medication (and drug class) is PLAN EXCLUSION under Medicare Part D.      Tried to call patient and phone just rings.     Corinne McEwen, [facility] at [address], [address]   [place] [address]  phone: [phone]   fax: [phone]

## 2024-10-22 NOTE — PROGRESS NOTES
Chief Complaint   Patient presents with    6 Month Follow-Up     \"Have you been to the ER, urgent care clinic since your last visit?  Hospitalized since your last visit?\"    YES - When: approximately 4 months ago.  Where and Why: Urgent Care.    “Have you seen or consulted any other health care providers outside of [facility] since your last visit?”    NO

## 2024-10-23 ENCOUNTER — TELEPHONE (OUTPATIENT)
Age: 74
End: 2024-10-23

## 2024-10-23 NOTE — TELEPHONE ENCOUNTER
Clinical Documentation for Medication Management  10/22/2024  McEwen, Corinne, RPH - [facility]  Diagnoses    None  Orders Signed This Visit    None  Orders Pended This Visit    None  Notes    McEwen, Corinne, RPH filed at 10/22/2024  9:12 AM    Status: Signed   [facility] at [facility]  Specialty Pharmacy Update     Date: 10/22/24     Tamanna Sunshinedarian 1950     Medication: Zepbound 2.5 mg/0.5 ml    This medication (and drug class) is PLAN EXCLUSION under Medicare Part D.       Tried to call patient and phone just rings.      Corinne McEwen, RPH  [facility] at [address], [address]   [place] [address]  phone: [phone]   fax: [phone]

## 2025-01-30 NOTE — PROGRESS NOTES
"  Outpatient Rehab    Physical Therapy Visit    Patient Name: Manju Aranda  MRN: [id number]  YOB: 1951  Today's Date: 1/31/2025    Therapy Diagnosis:   Encounter Diagnoses   Name Primary?    Primary osteoarthritis of left knee Yes    Decreased range of motion of left knee     Chronic pain of left knee      Physician: Mack Torre III, *    Physician Orders: PT Eval and Treat   Medical Diagnosis from Referral:  Primary osteoarthritis of left knee [M17.12]   Evaluation Date: 12/26/2024  Authorization Period Expiration: 5/24/2025  Plan of Care Expiration: 2/14/2025  Visit # / Visits authorized: 10/10  FOTO 1st follow up: complete  FOTO 2nd follow up:      Time In: 0852   Time Out: 0945  Total Time: 53   Total Billable Time: 53 minutes          Subjective   That she feels like her knee is more mobility and feels better during exercises..  Pain reported as 1. Medial knee    Objective     Treatment:  Manual Therapy  Manual Therapy Activity 1: Patellofemoral joint mobilization Grade III    Balance/Neuromuscular Re-Education  Balance/Neuromuscular Re-Education Activity 1: Standing Lateral Walks on Plinth with Upper Extremity support 3 laps  Balance/Neuromuscular Re-Education Activity 2: Quad Set 5' with towel under heel  Balance/Neuromuscular Re-Education Activity 3: Straight Leg Raise 30x's  Balance/Neuromuscular Re-Education Activity 4: TKE with Band 30x's Orange  Balance/Neuromuscular Re-Education Activity 5: Long Arc Quad 30x's  Balance/Neuromuscular Re-Education Activity 6: Short Arc Quad 30x's with strap hold    Therapeutic Activity  Therapeutic Activity 1: Recumbent Bike 8'  Therapeutic Activity 2: Heel Prop 5' 5#  Therapeutic Activity 3: Shuttle Squats 2 cords 3'  Therapeutic Activity 4: Sit to Stand 3 x 5 reps  Therapeutic Activity 5: Step Up 6" 2 x 10 reps  Therapeutic Activity 6: Mini squats with B HR 2x10 repetitions  Therapeutic Activity 7: +Heel raises with B HR support 3x10 " Pt has osteopenia  Will discuss at OV    Please [place] 4[place]10 [place]    Patient's spiritual, cultural, and educational needs considered and patient agreeable to plan of care and goals.     Assessment & Plan   Assessment: Patient will continue to benefit from skilled physical therapy services to address physical impairments, movement impairment syndromes, and functional deficits to reach established short and long term goals to improve participation in recreational tasks and activities of daily living. Patient performed step ups with improved mobility and strength.  Evaluation/Treatment Tolerance: Patient tolerated treatment well        Plan: Patient will be continually assessed and progressed as appropriate to meet all short and long term goals.    Goals:   Short Term Goals (3 weeks):  1. Patient will have improved AROM of the left knee to 0-120 degrees for pre-operative mobility gains. progressing  2. Patient will have decreased complaints of pain to 3/10 with prolonged walking/sitting.MET  3. Patient will be independent and compliant with issued HEP.MET     Long Term Goals (6 weeks):   Patient will have improved AROM of the left knee to 0-122 degrees for pre-operative mobility gains.   2. Patient will have improved strength of the left knee to >90% of the Right hamstring for improved force production. MET  3. Patient will be able to resume prior functional level with no deficits.  MET  4. Patient will have overall improvement in condition to have increased score on KOOS to 60% to show clinically important difference in patient perceived functional ability.  5. Pt will improve FOTO limitation score to less than or equal to 49% for improved self perception of functional performance with daily activities.

## 2025-04-22 ENCOUNTER — OFFICE VISIT (OUTPATIENT)
Age: 75
End: 2025-04-22
Payer: MEDICARE

## 2025-04-22 VITALS
WEIGHT: 198.4 LBS | HEART RATE: 73 BPM | DIASTOLIC BLOOD PRESSURE: 84 MMHG | OXYGEN SATURATION: 100 % | SYSTOLIC BLOOD PRESSURE: 139 MMHG | BODY MASS INDEX: 38.95 KG/M2 | HEIGHT: 60 IN | TEMPERATURE: 97.7 F | RESPIRATION RATE: 18 BRPM

## 2025-04-22 DIAGNOSIS — I10 PRIMARY HYPERTENSION: Primary | ICD-10-CM

## 2025-04-22 DIAGNOSIS — H91.93 BILATERAL HEARING LOSS, UNSPECIFIED HEARING LOSS TYPE: ICD-10-CM

## 2025-04-22 DIAGNOSIS — E66.812 OBESITY, CLASS II, BMI 35-39.9, ISOLATED: ICD-10-CM

## 2025-04-22 DIAGNOSIS — Z12.11 COLON CANCER SCREENING: ICD-10-CM

## 2025-04-22 DIAGNOSIS — N32.81 OVERACTIVE BLADDER: ICD-10-CM

## 2025-04-22 LAB
ALBUMIN SERPL-MCNC: 3.9 G/DL (ref 3.5–5)
ALBUMIN/GLOB SERPL: 1.3 (ref 1.1–2.2)
ALP SERPL-CCNC: 94 U/L (ref 45–117)
ALT SERPL-CCNC: 24 U/L (ref 12–78)
ANION GAP SERPL CALC-SCNC: 4 MMOL/L (ref 2–12)
AST SERPL-CCNC: 18 U/L (ref 15–37)
BILIRUB SERPL-MCNC: 0.4 MG/DL (ref 0.2–1)
BUN SERPL-MCNC: 20 MG/DL (ref 6–20)
BUN/CREAT SERPL: 32 (ref 12–20)
CALCIUM SERPL-MCNC: 9.2 MG/DL (ref 8.5–10.1)
CHLORIDE SERPL-SCNC: 108 MMOL/L (ref 97–108)
CHOLEST SERPL-MCNC: 197 MG/DL
CO2 SERPL-SCNC: 29 MMOL/L (ref 21–32)
CREAT SERPL-MCNC: 0.63 MG/DL (ref 0.55–1.02)
ERYTHROCYTE [DISTWIDTH] IN BLOOD BY AUTOMATED COUNT: 13.3 % (ref 11.5–14.5)
GLOBULIN SER CALC-MCNC: 3.1 G/DL (ref 2–4)
GLUCOSE SERPL-MCNC: 93 MG/DL (ref 65–100)
HCT VFR BLD AUTO: 45.7 % (ref 35–47)
HDLC SERPL-MCNC: 65 MG/DL
HDLC SERPL: 3 (ref 0–5)
HGB BLD-MCNC: 14.7 G/DL (ref 11.5–16)
LDLC SERPL CALC-MCNC: 119.2 MG/DL (ref 0–100)
MCH RBC QN AUTO: 31.6 PG (ref 26–34)
MCHC RBC AUTO-ENTMCNC: 32.2 G/DL (ref 30–36.5)
MCV RBC AUTO: 98.3 FL (ref 80–99)
NRBC # BLD: 0 K/UL (ref 0–0.01)
NRBC BLD-RTO: 0 PER 100 WBC
PLATELET # BLD AUTO: 222 K/UL (ref 150–400)
PMV BLD AUTO: 12.1 FL (ref 8.9–12.9)
POTASSIUM SERPL-SCNC: 4.2 MMOL/L (ref 3.5–5.1)
PROT SERPL-MCNC: 7 G/DL (ref 6.4–8.2)
RBC # BLD AUTO: 4.65 M/UL (ref 3.8–5.2)
SODIUM SERPL-SCNC: 141 MMOL/L (ref 136–145)
TRIGL SERPL-MCNC: 64 MG/DL
VLDLC SERPL CALC-MCNC: 12.8 MG/DL
WBC # BLD AUTO: 4.9 K/UL (ref 3.6–11)

## 2025-04-22 PROCEDURE — 1159F MED LIST DOCD IN RCRD: CPT | Performed by: FAMILY MEDICINE

## 2025-04-22 PROCEDURE — 1123F ACP DISCUSS/DSCN MKR DOCD: CPT | Performed by: FAMILY MEDICINE

## 2025-04-22 PROCEDURE — 1036F TOBACCO NON-USER: CPT | Performed by: FAMILY MEDICINE

## 2025-04-22 PROCEDURE — G8427 DOCREV CUR MEDS BY ELIG CLIN: HCPCS | Performed by: FAMILY MEDICINE

## 2025-04-22 PROCEDURE — 99214 OFFICE O/P EST MOD 30 MIN: CPT | Performed by: FAMILY MEDICINE

## 2025-04-22 PROCEDURE — 1160F RVW MEDS BY RX/DR IN RCRD: CPT | Performed by: FAMILY MEDICINE

## 2025-04-22 PROCEDURE — 1126F AMNT PAIN NOTED NONE PRSNT: CPT | Performed by: FAMILY MEDICINE

## 2025-04-22 PROCEDURE — 3017F COLORECTAL CA SCREEN DOC REV: CPT | Performed by: FAMILY MEDICINE

## 2025-04-22 PROCEDURE — 1090F PRES/ABSN URINE INCON ASSESS: CPT | Performed by: FAMILY MEDICINE

## 2025-04-22 PROCEDURE — G8399 PT W/DXA RESULTS DOCUMENT: HCPCS | Performed by: FAMILY MEDICINE

## 2025-04-22 PROCEDURE — G8417 CALC BMI ABV UP PARAM F/U: HCPCS | Performed by: FAMILY MEDICINE

## 2025-04-22 PROCEDURE — 3075F SYST BP GE 130 - 139MM HG: CPT | Performed by: FAMILY MEDICINE

## 2025-04-22 PROCEDURE — 3078F DIAST BP <80 MM HG: CPT | Performed by: FAMILY MEDICINE

## 2025-04-22 SDOH — ECONOMIC STABILITY: FOOD INSECURITY: WITHIN THE PAST 12 MONTHS, YOU WORRIED THAT YOUR FOOD WOULD RUN OUT BEFORE YOU GOT MONEY TO BUY MORE.: NEVER TRUE

## 2025-04-22 SDOH — ECONOMIC STABILITY: FOOD INSECURITY: WITHIN THE PAST 12 MONTHS, THE FOOD YOU BOUGHT JUST DIDN'T LAST AND YOU DIDN'T HAVE MONEY TO GET MORE.: NEVER TRUE

## 2025-04-22 ASSESSMENT — PATIENT HEALTH QUESTIONNAIRE - PHQ9
SUM OF ALL RESPONSES TO PHQ QUESTIONS 1-9: 0
2. FEELING DOWN, DEPRESSED OR HOPELESS: NOT AT ALL
SUM OF ALL RESPONSES TO PHQ QUESTIONS 1-9: 0
SUM OF ALL RESPONSES TO PHQ QUESTIONS 1-9: 0
1. LITTLE INTEREST OR PLEASURE IN DOING THINGS: NOT AT ALL
SUM OF ALL RESPONSES TO PHQ QUESTIONS 1-9: 0

## 2025-04-22 ASSESSMENT — ENCOUNTER SYMPTOMS
SHORTNESS OF BREATH: 0
ABDOMINAL PAIN: 0

## 2025-04-22 NOTE — PROGRESS NOTES
Chief Complaint   Patient presents with    Follow-up    Hypertension         \"Have you been to the ER, urgent care clinic since your last visit?  Hospitalized since your last visit?\"    NO    “Have you seen or consulted any other health care providers outside of [facility] since your last visit?”    NO        “Have you had a colorectal cancer screening such as a colonoscopy/FIT/Cologuard?    NO    Date of last Colonoscopy: 7/8/2011  Date of last Cologuard: 2/18/2022  No FIT/FOBT on file   No flexible sigmoidoscopy on file         Click Here for Release of Records Request           4/22/2025     8:23 AM   PHQ-9    Little interest or pleasure in doing things 0   Feeling down, depressed, or hopeless 0   PHQ-2 Score 0   PHQ-9 Total Score 0           Financial Resource Strain: Low Risk  (4/22/2024)    Overall Financial Resource Strain (CARDIA)     Difficulty of Paying Living Expenses: Not very hard      Food Insecurity: No Food Insecurity (4/22/2025)    Hunger Vital Sign     Worried About Running Out of Food in the Last Year: Never true     Ran Out of Food in the Last Year: Never true          Health Maintenance Due   Topic Date Due    COVID-19 Vaccine (4 - 2024-25 season) 09/01/2024    Colorectal Cancer Screen  02/18/2025    Respiratory Syncytial Virus (RSV) Pregnant or age 60 yrs+ (1 - 1-dose 75+ series) Never done    Depression Screen  04/22/2025

## 2025-04-22 NOTE — PROGRESS NOTES
Tamanna Denis 75 y.o. female  presents to the office today 6 month f/u    There were no vitals taken for this visit. There is no height or weight on file to calculate BMI.   Chief Complaint   Patient presents with    Follow-up    Hypertension        History of Present Illness  The patient presents for evaluation of overactive bladder, hearing loss, elevated blood pressure, and weight management.    She reports experiencing tinnitus, which she describes as a sensation akin to the sound of the ocean in her ears. She also mentions a single episode of ear ringing. Her last audiological assessment was conducted several years ago. She has been informed by her  that she may be experiencing hearing loss. Additionally, she reports a sensation of air expulsion from her ears. She also reports being awakened at night due to the sounds her ears are making.    She does not monitor her blood pressure at home and is not currently on any antihypertensive medications. She occasionally experiences numbness in her hands and fingers during sleep.    She has been struggling with weight loss, with fluctuations of approximately 10 pounds. She underwent a colonoscopy in 2011, which revealed multiple nonbleeding diverticula. She was advised to repeat the procedure in 10 years. A subsequent Cologuard test was performed 3 years ago, yielding normal results. She reports no known family history of colon cancer.    She is currently on medication for overactive bladder and takes Benadryl daily for allergies. She had a hysterectomy at a late age.    PAST SURGICAL HISTORY: Hysterectomy    FAMILY HISTORY  She reports no known family history of colon cancer.      Current Outpatient Medications   Medication Sig Dispense Refill    nabumetone (RELAFEN) 750 MG tablet Take 1 tablet by mouth 2 times daily      montelukast (SINGULAIR) 10 MG tablet Take 1 tab po daily prn       No current facility-administered medications for this visit.

## 2025-04-30 ENCOUNTER — TELEPHONE (OUTPATIENT)
Age: 75
End: 2025-04-30

## 2025-04-30 NOTE — TELEPHONE ENCOUNTER
Attempted to contact patient to schedule colonoscopy. Number just rings and then disconnects. Unable to leave a message.

## 2025-04-30 NOTE — Clinical Note
Tamanna Denis                                                                [address] [address]         4/30/25     Dear Ms. Denis:  MRN:[id number]    This message is regarding a referral that needs to be scheduled. We were unable to reach you by phone; however, your referral is available for review in [facility] under insurance in the drop down menu. We will be making further attempts to contact you to get this scheduled.       Thank you,  [facility]

## 2025-05-01 ENCOUNTER — PREP FOR PROCEDURE (OUTPATIENT)
Age: 75
End: 2025-05-01

## 2025-05-01 DIAGNOSIS — Z12.11 SCREENING FOR COLON CANCER: ICD-10-CM

## 2025-05-01 NOTE — TELEPHONE ENCOUNTER
Contacted patient back to schedule colonoscopy. Patient requested some time in September. Patient accepted 9/12. Notified patient of arrival time and stated that I will send a surgical letter via MyChart and home address. Patient thanked me for the call.

## 2025-05-25 ENCOUNTER — RESULTS FOLLOW-UP (OUTPATIENT)
Age: 75
End: 2025-05-25

## 2025-05-31 PROBLEM — Z12.11 SCREENING FOR COLON CANCER: Status: RESOLVED | Noted: 2025-05-01 | Resolved: 2025-05-31

## 2025-08-28 DIAGNOSIS — Z12.11 SCREENING FOR COLON CANCER: Primary | ICD-10-CM

## 2025-08-29 RX ORDER — SODIUM, POTASSIUM,MAG SULFATES 17.5-3.13G
1 SOLUTION, RECONSTITUTED, ORAL ORAL SEE ADMIN INSTRUCTIONS
Qty: 1 KIT | Refills: 0 | Status: SHIPPED | OUTPATIENT
Start: 2025-08-29

## 2025-09-03 ENCOUNTER — TELEPHONE (OUTPATIENT)
Age: 75
End: 2025-09-03

## 2025-09-04 PROBLEM — Z12.11 SCREENING FOR COLON CANCER: Status: ACTIVE | Noted: 2025-05-01
